# Patient Record
Sex: FEMALE | Race: WHITE | NOT HISPANIC OR LATINO | Employment: OTHER | URBAN - METROPOLITAN AREA
[De-identification: names, ages, dates, MRNs, and addresses within clinical notes are randomized per-mention and may not be internally consistent; named-entity substitution may affect disease eponyms.]

---

## 2017-01-06 RX ORDER — SODIUM CHLORIDE 0.9 % (FLUSH) 0.9 %
10 SYRINGE (ML) INJECTION
Status: CANCELLED | OUTPATIENT
Start: 2017-01-06

## 2017-01-06 RX ORDER — SODIUM CHLORIDE 9 MG/ML
INJECTION, SOLUTION INTRAVENOUS CONTINUOUS
Status: CANCELLED | OUTPATIENT
Start: 2017-01-06

## 2017-01-06 RX ORDER — HEPARIN 100 UNIT/ML
5 SYRINGE INTRAVENOUS
Status: CANCELLED | OUTPATIENT
Start: 2017-01-06

## 2017-01-12 ENCOUNTER — TELEPHONE (OUTPATIENT)
Dept: HEMATOLOGY/ONCOLOGY | Facility: CLINIC | Age: 77
End: 2017-01-12

## 2017-01-12 NOTE — TELEPHONE ENCOUNTER
----- Message from Namita Lorenzo sent at 1/10/2017 12:22 PM CST -----  Contact: Kerrie  Patient's daughter is calling to let know patient started the radiation treatments today. Thanks!

## 2017-01-16 ENCOUNTER — TELEPHONE (OUTPATIENT)
Dept: HEMATOLOGY/ONCOLOGY | Facility: CLINIC | Age: 77
End: 2017-01-16

## 2017-01-16 NOTE — TELEPHONE ENCOUNTER
Called pt and left her a message informing her of her lab appt scheduled for 1/27/17 @ 11AM and her Dr. Ceballos appt scheduled for 2/1/17 @ 11AM.  Asked pt to please return my call at [phone] for any questions or if this is not a good day/time for her.

## 2017-01-27 ENCOUNTER — LAB VISIT (OUTPATIENT)
Dept: LAB | Facility: HOSPITAL | Age: 77
End: 2017-01-27
Attending: INTERNAL MEDICINE
Payer: MEDICARE

## 2017-01-27 DIAGNOSIS — C34.00 MALIGNANT NEOPLASM OF HILUS OF LUNG, UNSPECIFIED LATERALITY: ICD-10-CM

## 2017-01-27 DIAGNOSIS — D50.8 OTHER IRON DEFICIENCY ANEMIA: ICD-10-CM

## 2017-01-27 LAB
ALBUMIN SERPL BCP-MCNC: 3.6 G/DL
ALP SERPL-CCNC: 182 U/L
ALT SERPL W/O P-5'-P-CCNC: 14 U/L
ANION GAP SERPL CALC-SCNC: 9 MMOL/L
ANISOCYTOSIS BLD QL SMEAR: ABNORMAL
AST SERPL-CCNC: 21 U/L
BASOPHILS # BLD AUTO: 0 K/UL
BASOPHILS NFR BLD: 0.5 %
BILIRUB SERPL-MCNC: 0.4 MG/DL
BUN SERPL-MCNC: 9 MG/DL
CALCIUM SERPL-MCNC: 10 MG/DL
CHLORIDE SERPL-SCNC: 101 MMOL/L
CO2 SERPL-SCNC: 26 MMOL/L
CREAT SERPL-MCNC: 0.8 MG/DL
DIFFERENTIAL METHOD: ABNORMAL
EOSINOPHIL # BLD AUTO: 0.3 K/UL
EOSINOPHIL NFR BLD: 3.5 %
ERYTHROCYTE [DISTWIDTH] IN BLOOD BY AUTOMATED COUNT: 30.2 %
EST. GFR  (AFRICAN AMERICAN): >60 ML/MIN/1.73 M^2
EST. GFR  (NON AFRICAN AMERICAN): >60 ML/MIN/1.73 M^2
FERRITIN SERPL-MCNC: 1307 NG/ML
GLUCOSE SERPL-MCNC: 102 MG/DL
HCT VFR BLD AUTO: 43.9 %
HGB BLD-MCNC: 13.5 G/DL
HYPOCHROMIA BLD QL SMEAR: ABNORMAL
IRON SERPL-MCNC: 103 UG/DL
LYMPHOCYTES # BLD AUTO: 1.4 K/UL
LYMPHOCYTES NFR BLD: 16.9 %
MCH RBC QN AUTO: 23.7 PG
MCHC RBC AUTO-ENTMCNC: 30.8 %
MCV RBC AUTO: 77 FL
MONOCYTES # BLD AUTO: 0.5 K/UL
MONOCYTES NFR BLD: 6.2 %
NEUTROPHILS # BLD AUTO: 6 K/UL
NEUTROPHILS NFR BLD: 72.9 %
OVALOCYTES BLD QL SMEAR: ABNORMAL
PLATELET # BLD AUTO: 264 K/UL
PLATELET BLD QL SMEAR: ABNORMAL
PMV BLD AUTO: 7.9 FL
POIKILOCYTOSIS BLD QL SMEAR: SLIGHT
POTASSIUM SERPL-SCNC: 4.1 MMOL/L
PROT SERPL-MCNC: 7.6 G/DL
RBC # BLD AUTO: 5.7 M/UL
SATURATED IRON: 30 %
SODIUM SERPL-SCNC: 136 MMOL/L
TOTAL IRON BINDING CAPACITY: 340 UG/DL
TRANSFERRIN SERPL-MCNC: 230 MG/DL
WBC # BLD AUTO: 8.2 K/UL

## 2017-01-27 PROCEDURE — 80053 COMPREHEN METABOLIC PANEL: CPT

## 2017-01-27 PROCEDURE — 36415 COLL VENOUS BLD VENIPUNCTURE: CPT

## 2017-01-27 PROCEDURE — 83540 ASSAY OF IRON: CPT

## 2017-01-27 PROCEDURE — 85025 COMPLETE CBC W/AUTO DIFF WBC: CPT

## 2017-01-27 PROCEDURE — 82728 ASSAY OF FERRITIN: CPT

## 2017-01-27 PROCEDURE — 84238 ASSAY NONENDOCRINE RECEPTOR: CPT

## 2017-01-28 LAB — STFR SERPL-MCNC: 7.2 MG/L

## 2017-02-01 ENCOUNTER — OFFICE VISIT (OUTPATIENT)
Dept: HEMATOLOGY/ONCOLOGY | Facility: CLINIC | Age: 77
End: 2017-02-01
Payer: MEDICARE

## 2017-02-01 VITALS
HEART RATE: 110 BPM | BODY MASS INDEX: 26.14 KG/M2 | WEIGHT: 138.44 LBS | SYSTOLIC BLOOD PRESSURE: 120 MMHG | DIASTOLIC BLOOD PRESSURE: 63 MMHG | HEIGHT: 61 IN | TEMPERATURE: 98 F | RESPIRATION RATE: 17 BRPM

## 2017-02-01 DIAGNOSIS — D50.0 IRON DEFICIENCY ANEMIA DUE TO CHRONIC BLOOD LOSS: Chronic | ICD-10-CM

## 2017-02-01 DIAGNOSIS — F32.0 MILD SINGLE CURRENT EPISODE OF MAJOR DEPRESSIVE DISORDER: Primary | ICD-10-CM

## 2017-02-01 DIAGNOSIS — J02.9 ACUTE PHARYNGITIS, UNSPECIFIED ETIOLOGY: ICD-10-CM

## 2017-02-01 DIAGNOSIS — R91.8 MASS OF UPPER LOBE OF RIGHT LUNG: ICD-10-CM

## 2017-02-01 DIAGNOSIS — C34.00 MALIGNANT NEOPLASM OF HILUS OF LUNG, UNSPECIFIED LATERALITY: ICD-10-CM

## 2017-02-01 PROCEDURE — 1157F ADVNC CARE PLAN IN RCRD: CPT | Mod: S$GLB,,, | Performed by: INTERNAL MEDICINE

## 2017-02-01 PROCEDURE — 1126F AMNT PAIN NOTED NONE PRSNT: CPT | Mod: S$GLB,,, | Performed by: INTERNAL MEDICINE

## 2017-02-01 PROCEDURE — 99999 PR PBB SHADOW E&M-EST. PATIENT-LVL III: CPT | Mod: PBBFAC,,, | Performed by: INTERNAL MEDICINE

## 2017-02-01 PROCEDURE — 99214 OFFICE O/P EST MOD 30 MIN: CPT | Mod: S$GLB,,, | Performed by: INTERNAL MEDICINE

## 2017-02-01 PROCEDURE — 1159F MED LIST DOCD IN RCRD: CPT | Mod: S$GLB,,, | Performed by: INTERNAL MEDICINE

## 2017-02-01 PROCEDURE — 99499 UNLISTED E&M SERVICE: CPT | Mod: S$PBB,,, | Performed by: INTERNAL MEDICINE

## 2017-02-01 PROCEDURE — 1160F RVW MEDS BY RX/DR IN RCRD: CPT | Mod: S$GLB,,, | Performed by: INTERNAL MEDICINE

## 2017-02-01 RX ORDER — AZITHROMYCIN 250 MG/1
TABLET, FILM COATED ORAL
Qty: 6 TABLET | Refills: 0 | Status: SHIPPED | OUTPATIENT
Start: 2017-02-01 | End: 2017-02-06

## 2017-02-01 RX ORDER — ALPRAZOLAM 0.25 MG/1
0.25 TABLET ORAL 3 TIMES DAILY PRN
Qty: 60 TABLET | Refills: 0 | Status: SHIPPED | OUTPATIENT
Start: 2017-02-01 | End: 2017-05-11 | Stop reason: SDUPTHER

## 2017-02-01 NOTE — LETTER
February 1, 2017      Silas Chowdary MD  [address]  [address]  [address]  [facility] [address]           [facility]  [address] [place] [address] Drive [address]  [facility] [address]  Phone: [phone]          Patient: Delicia Murray   MR Number: [id number]   YOB: 1940   Date of Visit: 2/1/2017       Dear Dr. Silas Chowdary:    Thank you for referring Delicia Murray to me for evaluation. Attached you will find relevant portions of my assessment and plan of care.    If you have questions, please do not hesitate to call me. I look forward to following Delicia Murray along with you.    Sincerely,    Brianna Ceballos MD    Enclosure  CC:  No Recipients    If you would like to receive this communication electronically, please contact [email or web address] or [phone] to request more information on [employer] Link access.    For providers and/or their staff who would like to refer a patient to [facility], please contact us through our one-stop-shop provider referral line, [facility] , at [phone].    If you feel you have received this communication in error or would no longer like to receive these types of communications, please e-mail [email or web address]

## 2017-02-01 NOTE — MR AVS SNAPSHOT
[facility]  [address] [place] [address] Drive [address]  [place] [address]  Phone: [phone]                  Delicia Murray   2017 11:00 AM   Office Visit    Description:  Female : 1940   Provider:  Brianna Ceballos MD   Department:  [place] [facility]           Reason for Visit     Lung Cancer     Cough           Diagnoses this Visit        Comments    Mild single current episode of major depressive disorder    -  Primary     Iron deficiency anemia due to chronic blood loss         Mass of upper lobe of right lung         Acute pharyngitis, unspecified etiology         Malignant neoplasm of hilus of lung, unspecified laterality                To Do List           Future Appointments        Provider Department Dept Phone    2017 11:00 AM Silas Chowdary MD [facility] [phone]    3/13/2017 11:00 AM [facility] [phone]    3/20/2017 11:00 AM MD [facility] [phone]    2017 1:00 PM Mono Duncan MD [facility] [phone]      Goals (5 Years of Data)     None       These Medications        Disp Refills Start End    azithromycin (ZITHROMAX) 250 MG tablet 6 tablet 0 2017    Take 2 tablets (500 mg) on  Day 1,  followed by 1 tablet (250 mg) once daily on Days 2 through 5.    [facility] [id number] [place] [address] [place] - [address] [facility] Ph #: [phone]       alprazolam (XANAX) 0.25 MG tablet 60 tablet 0 2017    Take 1 tablet (0.25 mg total) by mouth 3 (three) times daily as needed for Anxiety. - Oral    Pharmacy: [facility] [id number]  [place] - [address] [facility] Ph #: [phone]         [facility] On Call     [facility] On Call Nurse Care Line -  Assistance  Registered nurses in the [facility] On Call Center provide clinical advisement, health  "education, appointment booking, and other advisory services.  Call for this free service at [phone].             Medications           Message regarding Medications     Verify the changes and/or additions to your medication regime listed below are the same as discussed with your clinician today.  If any of these changes or additions are incorrect, please notify your healthcare provider.        START taking these NEW medications        Refills    azithromycin (ZITHROMAX) 250 MG tablet 0    Sig: Take 2 tablets (500 mg) on  Day 1,  followed by 1 tablet (250 mg) once daily on Days 2 through 5.    Class: Normal           Verify that the below list of medications is an accurate representation of the medications you are currently taking.  If none reported, the list may be blank. If incorrect, please contact your healthcare provider. Carry this list with you in case of emergency.           Current Medications     acetaminophen (TYLENOL) 325 MG tablet Take 325 mg by mouth every 6 (six) hours as needed.    alprazolam (XANAX) 0.25 MG tablet Take 1 tablet (0.25 mg total) by mouth 3 (three) times daily as needed for Anxiety.    mirtazapine (REMERON) 15 MG tablet Take 1 tablet (15 mg total) by mouth every evening.    pantoprazole (PROTONIX) 40 MG tablet Take 1 tablet (40 mg total) by mouth 2 (two) times daily.    sertraline (ZOLOFT) 100 MG tablet Take 1 tablet (100 mg total) by mouth every evening.    umeclidinium-vilanterol (ANORO ELLIPTA) 62.5-25 mcg/actuation DsDv Inhale 1 puff into the lungs once daily.    azithromycin (ZITHROMAX) 250 MG tablet Take 2 tablets (500 mg) on  Day 1,  followed by 1 tablet (250 mg) once daily on Days 2 through 5.           Clinical Reference Information           Vital Signs - Last Recorded  Most recent update: 2/1/2017 10:46 AM by Maura Su LPN    BP Pulse Temp Resp Ht Wt    120/63 110 97.6 °F (36.4 °C) 17 5' 1" (1.549 m) 62.8 kg (138 lb 7.2 oz)    BMI                26.16 kg/m2     "      Blood Pressure          Most Recent Value    BP  120/63      Allergies as of 2/1/2017     Ferrlecit [Sodium Ferric Gluconat-sucrose]    Sulfa (Sulfonamide Antibiotics)    Adhesive      Immunizations Administered on Date of Encounter - 2/1/2017     None      Orders Placed During Today's Visit     Future Labs/Procedures Expected by Expires    CBC w/ DIFF  2/1/2017 4/2/2018    CMP  2/1/2017 4/2/2018    FERRITIN  2/1/2017 4/2/2018    Iron and TIBC  2/1/2017 4/2/2018    STFR  2/1/2017 4/2/2018

## 2017-02-01 NOTE — PROGRESS NOTES
Subjective:       Patient ID: Delicia Murray is a 76 y.o. female.    Chief Complaint: Lung Cancer and Cough    HPI:   Pt last saw me in 6/2016 . Has been following on a 3 monthly schedule for SARAH, heavy menses in her youth, had scopes and w/u did well after iron infusion. Went to Dr. Duncan's clinic for copd exacerbation and feeling poorly, cxr revealed rt lung mass . Pt was ref. To DR. Chowdary pulmonolgy and then to Dr. velazquez with whom she underwent FNA of this mass.here to discuss results with her daughters. Sq cell ca, went back to f/u with DR. Velazquez, lung probably wont have good outcome from resection struggling with URI  Chose to go with xrt and has completed xrt    REVIEW OF SYSTEMS:     CONSTITUTIONAL: having URI+  SKIN: Denies rash, issues with nails, non-healing sores, bleeding, blotching    skin or abnormal bruising. Denies new moles or changes to existing moles.      BREASTS: There is no swelling around breasts or nipple discharge.    EYES: Denies eye pain, blurred vision, swelling, redness or discharge.      Cough + sputum production+  Feeling stuffy+  CARDIOVASCULAR: Denies chest pain, discomfort or palpitations. Denies neck    swelling or episodes of passing out.      RESPIRATORY: Denies cough, sputum production, blood in sputum, and denies    shortness of breath.      GI: Denies trouble swallowing, indigestion, heartburn, abdominal pain, nausea,    vomiting, diarrhea, altered bowel habits, blood in stool, discoloration of    stools, change in nature of stool, bloating, increased abdominal girth.      GENITOURINARY: No discharge. No pelvic pain or lumps. No rash around groin or  lesions. No urinary frequency, hesitation, painful urination or blood in    urine. Denies incontinence. No problems with intercourse.      MUSCULOSKELETAL: Denies neck or back pain. Denies weakness in arms or legs,    joint problems or distended inflamed veins in legs. Denies swelling or abnormal  glands.      NEUROLOGICAL:  Denies tingling, numbness, altered mentation changes to nerve    function in the face, weakness to one or both of the body. Denies changes to    gait and denies multiple falls or accidents.        PHYSICAL EXAM:     Vitals:    02/01/17 1044   BP: 120/63   Pulse: 110   Resp: 17   Temp: 97.6 °F (36.4 °C)       GENERAL: Comfortable looking patient. Patient is in no distress.  Awake, alert and oriented to time, person and place.  No anxiety, or agitation.      HEENT: Normal conjunctivae and eyelids. WNL.  PERRLA 3 to 4 mm. No icterus, no pallor, no congestion, and no discharge noted.     NECK:  Supple. Trachea is central.  No crepitus.  No JVD or masses.    RESPIRATORY:  No intercostal retractions.  No dullness to percussion.  Chest is clear to auscultation.  No rales, rhonchi or wheezes.  No crepitus.  Good air entry bilaterally.    CARDIOVASCULAR:  S1 and S2 are normally heard without murmurs or gallops.  All peripheral pulses are present.    ABDOMEN:  Normal abdomen.  No hepatosplenomegaly.  No free fluid.  Bowel sounds are present.  No hernia noted. No masses.  No rebound or tenderness.  No guarding or rigidity.  Umbilicus is midline.    LYMPHATICS:  No axillary, cervical, supraclavicular, submental, or inguinal lymphadenopathy.    SKIN/MUSCULOSKELETAL:  There is no evidence of excoriation marks or ecchmosis.  No rashes.  No cyanosis.  No clubbing.  No joint or skeletal deformities noted.  Normal range of motion.    NEUROLOGIC:  Higher functions are appropriate.  No cranial nerve deficits.  Normal nagi.  Normal strength.  Motor and sensory functions are normal.  Deep tendon reflexes are normal.    GENITAL/RECTAL:  Exams are deferred.      Laboratory:     CBC:  Lab Results   Component Value Date    WBC 8.20 01/27/2017    RBC 5.70 (H) 01/27/2017    HGB 13.5 01/27/2017    HCT 43.9 01/27/2017    MCV 77 (L) 01/27/2017    MCH 23.7 (L) 01/27/2017    MCHC 30.8 (L) 01/27/2017    RDW 30.2 (H) 01/27/2017     01/27/2017     MPV 7.9 (L) 01/27/2017    GRAN 6.0 01/27/2017    GRAN 72.9 01/27/2017    LYMPH 1.4 01/27/2017    LYMPH 16.9 (L) 01/27/2017    MONO 0.5 01/27/2017    MONO 6.2 01/27/2017    EOS 0.3 01/27/2017    BASO 0.00 01/27/2017    EOSINOPHIL 3.5 01/27/2017    BASOPHIL 0.5 01/27/2017       BMP: BMP  Lab Results   Component Value Date     01/27/2017    K 4.1 01/27/2017     01/27/2017    CO2 26 01/27/2017    BUN 9 01/27/2017    CREATININE 0.8 01/27/2017    CALCIUM 10.0 01/27/2017    ANIONGAP 9 01/27/2017    ESTGFRAFRICA >60 01/27/2017    EGFRNONAA >60 01/27/2017       LFT:   Lab Results   Component Value Date    ALT 14 01/27/2017    AST 21 01/27/2017    ALKPHOS 182 (H) 01/27/2017    BILITOT 0.4 01/27/2017     Lab Results   Component Value Date    IRON 103 01/27/2017    TIBC 340 01/27/2017    FERRITIN 1307 (H) 01/27/2017       Assessment/Plan:     FINAL PATHOLOGIC DIAGNOSIS  CT-GUIDED CORE NEEDLE BIOPSY OF THE RIGHT UPPER LOBE:  POSITIVE FOR SQUAMOUS CELL CARCINOMA     Assessment/Plan:       1. Malignant neoplasm of hilus of lung, unspecified laterality        2. SARAH , good rsponse to injectafer3. Anxiety, depression  4.GERDS  . PET scan reviwed [facility],   No evidence of metatstic dz,  Pt saw DR. Haney, surgery vs xrt discussed.  Pt has completed xrt rx to lung 1/20. Going to see Dr. Chowdary on Monday, sees Dr. rosenthal on Friday this week   rtc 6 weeks with cbc, cmp   zpak for uri   xanax was not approved

## 2017-02-06 ENCOUNTER — OFFICE VISIT (OUTPATIENT)
Dept: PULMONOLOGY | Facility: CLINIC | Age: 77
End: 2017-02-06
Payer: MEDICARE

## 2017-02-06 VITALS
BODY MASS INDEX: 26.47 KG/M2 | OXYGEN SATURATION: 95 % | WEIGHT: 140.19 LBS | SYSTOLIC BLOOD PRESSURE: 139 MMHG | HEART RATE: 103 BPM | DIASTOLIC BLOOD PRESSURE: 77 MMHG | HEIGHT: 61 IN

## 2017-02-06 DIAGNOSIS — C34.11 MALIGNANT NEOPLASM OF UPPER LOBE OF RIGHT LUNG: ICD-10-CM

## 2017-02-06 DIAGNOSIS — J43.9 PULMONARY EMPHYSEMA, UNSPECIFIED EMPHYSEMA TYPE: ICD-10-CM

## 2017-02-06 DIAGNOSIS — J84.10 PULMONARY FIBROSIS: Primary | ICD-10-CM

## 2017-02-06 DIAGNOSIS — J44.9 COPD MIXED TYPE: ICD-10-CM

## 2017-02-06 PROCEDURE — 1126F AMNT PAIN NOTED NONE PRSNT: CPT | Mod: S$GLB,,, | Performed by: INTERNAL MEDICINE

## 2017-02-06 PROCEDURE — 99214 OFFICE O/P EST MOD 30 MIN: CPT | Mod: S$GLB,,, | Performed by: INTERNAL MEDICINE

## 2017-02-06 PROCEDURE — 1160F RVW MEDS BY RX/DR IN RCRD: CPT | Mod: S$GLB,,, | Performed by: INTERNAL MEDICINE

## 2017-02-06 PROCEDURE — 99499 UNLISTED E&M SERVICE: CPT | Mod: S$PBB,,, | Performed by: INTERNAL MEDICINE

## 2017-02-06 PROCEDURE — 1159F MED LIST DOCD IN RCRD: CPT | Mod: S$GLB,,, | Performed by: INTERNAL MEDICINE

## 2017-02-06 PROCEDURE — 1157F ADVNC CARE PLAN IN RCRD: CPT | Mod: S$GLB,,, | Performed by: INTERNAL MEDICINE

## 2017-02-06 PROCEDURE — 99999 PR PBB SHADOW E&M-EST. PATIENT-LVL III: CPT | Mod: PBBFAC,,, | Performed by: INTERNAL MEDICINE

## 2017-02-06 NOTE — PATIENT INSTRUCTIONS
You have wheezes.  Prednisone should help clear and would give energy.   Suggest take for 3 days and repeat if needed.    Pulmonary fibrosis looks to be present?, special medication may help.  Would follow ct chest and consider fibrosis treatment if needed.  Picture perhaps more likely to be copd with emphysema looking like fibrosis especially with wheezes on exam.

## 2017-02-06 NOTE — MR AVS SNAPSHOT
[facility]  [address]  [place] [address]  Phone: [phone]                  Delicia Murray   2017 11:00 AM   Office Visit    Description:  Female : 1940   Provider:  Silas Chowdary MD   Department:  [facility]           Reason for Visit     Follow-up     Lung Mass           Diagnoses this Visit        Comments    Pulmonary fibrosis    -  Primary     Pulmonary emphysema, unspecified emphysema type         COPD mixed type         Malignant neoplasm of upper lobe of right lung                To Do List           Future Appointments        Provider Department Dept Phone    3/13/2017 11:00 AM LAB[facility] [phone]    3/20/2017 11:00 AM MD [facility] [phone]    2017 1:00 PM MD [facility] Family Medicine [phone]    2017 11:00 [facility] [phone]      Goals (5 Years of Data)     None      Follow-Up and Disposition     Return in about 4 months (around 2017).    Follow-up and Disposition History      [facility] On Call     [facility] On Call Nurse Care Line -  Assistance  Registered nurses in the [facility] On Call Center provide clinical advisement, health education, appointment booking, and other advisory services.  Call for this free service at [phone].             Medications           Message regarding Medications     Verify the changes and/or additions to your medication regime listed below are the same as discussed with your clinician today.  If any of these changes or additions are incorrect, please notify your healthcare provider.             Verify that the below list of medications is an accurate representation of the medications you are currently taking.  If none reported, the list may be blank. If incorrect, please contact your healthcare provider. Carry this list with you in case of emergency.     "       Current Medications     acetaminophen (TYLENOL) 325 MG tablet Take 325 mg by mouth every 6 (six) hours as needed.    alprazolam (XANAX) 0.25 MG tablet Take 1 tablet (0.25 mg total) by mouth 3 (three) times daily as needed for Anxiety.    azithromycin (ZITHROMAX) 250 MG tablet Take 2 tablets (500 mg) on  Day 1,  followed by 1 tablet (250 mg) once daily on Days 2 through 5.    mirtazapine (REMERON) 15 MG tablet Take 1 tablet (15 mg total) by mouth every evening.    pantoprazole (PROTONIX) 40 MG tablet Take 1 tablet (40 mg total) by mouth 2 (two) times daily.    sertraline (ZOLOFT) 100 MG tablet Take 1 tablet (100 mg total) by mouth every evening.    umeclidinium-vilanterol (ANORO ELLIPTA) 62.5-25 mcg/actuation DsDv Inhale 1 puff into the lungs once daily.           Clinical Reference Information           Your Vitals Were     BP Pulse Height Weight SpO2 BMI    139/77 (BP Location: Left arm, Patient Position: Sitting, BP Method: Automatic) 103 5' 1" (1.549 m) 63.6 kg (140 lb 3.4 oz) 95% 26.49 kg/m2      Blood Pressure          Most Recent Value    BP  139/77      Allergies as of 2/6/2017     Ferrlecit [Sodium Ferric Gluconat-sucrose]    Sulfa (Sulfonamide Antibiotics)    Adhesive      Immunizations Administered on Date of Encounter - 2/6/2017     None      Instructions    You have wheezes.  Prednisone should help clear and would give energy.   Suggest take for 3 days and repeat if needed.    Pulmonary fibrosis looks to be present?, special medication may help.  Would follow ct chest and consider fibrosis treatment if needed.  Picture perhaps more likely to be copd with emphysema looking like fibrosis especially with wheezes on exam.       Language Assistance Services     ATTENTION: Language assistance services are available, free of charge. Please call [phone].      ATENCIÓN: Si patrick stephens, tiene a hartley disposición servicios gratuitos de asistencia lingüística. Llame al [phone].     CHÚ Ý: N?u b?n " nói Ti?ng Vi?t, có các d?ch v? h? tr? ngôn ng? mi?n phí dành cho b?n. G?i s? [phone].         [facility] complies with applicable Federal civil rights laws and does not discriminate on the basis of race, color, national origin, age, disability, or sex.

## 2017-02-06 NOTE — PROGRESS NOTES
"2/6/2017    Delicia Murray  New Patient Consult    Chief Complaint   Patient presents with    Follow-up    Lung Mass     Feb 6,  Has some bernard, elected for xrt- completed at cancer center on [address].  Feels well.  Fatigue ok , and iron levels up.  Anxiety good.        Nov 4, 2016HPI: i cared for pt's  in hosp.  Pt smoked past, has emphysema on no routine meds.  Had cough and felt poorly a month ago.  H/o chr iron def and freq fatigue.  Had lesion found right lung and had mass. Pet active.  No recent wt loss or bone pain.     anxiety on zoloft,         The chief compliant  problem is new to me",   PFSH:  Past Medical History   Diagnosis Date    Anemia     Blood transfusion     Colon polyps     Insomnia          Past Surgical History   Procedure Laterality Date    Hysterectomy      Cholecystectomy      Skin biopsy      Tonsillectomy      Broken jaw       left     Social History   Substance Use Topics    Smoking status: Former Smoker     Packs/day: 0.50     Years: 60.00    Smokeless tobacco: Former User     Quit date: 10/3/2016    Alcohol use Yes      Comment: occasional     Family History   Problem Relation Age of Onset    Heart disease Mother     Cancer Father 69     unknown     Review of patient's allergies indicates:   Allergen Reactions    Ferrlecit [sodium ferric gluconat-sucrose]      Generalized rash, pruritus    Sulfa (sulfonamide antibiotics)     Adhesive Rash       Performance Status:The patient's activity level is functions out of house.      Review of Systems:  a review of eleven systems covering constitutional, Eye, HEENT, Psych, Respiratory, Cardiac, GI, , Musculoskeletal, Endocrine, Dermatologic was negative except for pertinent findings as listed ABOVE and below: iron def and anxiety and copd..     Exam:Comprehensive exam done.   Visit Vitals    /77 (BP Location: Left arm, Patient Position: Sitting, BP Method: Automatic)    Pulse 103    Ht 5' 1" (1.549 m)    Wt " 63.6 kg (140 lb 3.4 oz)    SpO2 95%    BMI 26.49 kg/m2     Exam included Vitals as listed, and patient's appearance and affect and alertness and mood, oral exam for yeast and hygiene and pharynx lesions and Mallapatti (M) score, neck with inspection for jvd and masses and thyroid abnormalities and lymph nodes (supraclavicular and infraclavicular nodes and axillary also examined and noted if abn), chest exam included symmetry and effort and fremitus and percussion and auscultation, cardiac exam included rhythm and gallops and murmur and rubs and jvd and edema, abdominal exam for mass and hepatosplenomegaly and tenderness and hernias and bowel sounds, Musculoskeletal exam with muscle tone and posture and mobility/gait and  strength, and skin for rashes and cyanosis and pallor and turgor, extremity for clubbing.  Findings were normal except for pertinent findings listed below:  M2 and min rale with occ wheezes and good    Radiographs (ct chest and cxr) reviewed: view by direct vision 2013 and 2016 cxr with new rul mass, ild and emphysema seen. Ct viewed with nodes, pet viewed also    Axial scans of the chest were obtained without IV contrast    There are scattered lucencies and numerous scattered blebs including multiple peripheral subpleural blebs and there is peripheral intralobular septal thickening.  corresponding as seen on the recent chest x-ray of 10/10/2016 there is an irregular approximately 1.6 x 1.1 0.6 cm mass with spiculated indistinct margins extending to the right lateral chest wall with thickening of the overlying pleura.  More mild pleural thickening is seen elsewhere.    There are mildly prominent mediastinal lymph nodes short axis diameter precarinal node 9 mm.  There is old granulomatous disease with calcified hilar nodes.  There is moderately large hiatal hernia.  There are Calcified granulomas are seen in the liver and spleen and there surgical clips in the gallbladder fossa.  There are two  juxtapose or a lobular circumscribed 1.5 cm mass in the liver suggesting cyst and note is made that ultrasound abdomen 3/31/2015 do describe cysts.  Visualized abdominal aorta appears mildly ectatic.  There are degenerative changes in the spine and the skeletal structures are osteopenic and there is mild loss of height of a few vertebra near the thoracolumbar junction:       Impression          Emphysematous change and findings consistent pulmonary fibrosis appearing severe.  Spiculated density right upper lobe suspicious for neoplasm.  differential includes pneumonia and recommend correlation clinically particularly if clinical consideration for pneumonia short interval follow could be considered to determine if this resolves.      Electronically signed by: VINNIE CROWLEY MD  Date: 10/17/16  Time: 14:12          Labs reviewed from last CBC,CMP,ABG,Micro last 3 entries  on EPIC result review    hbg close to 8    PFT results reviewed fev 1.06-1.15 and dlco 54%    Plan:  Clinical impression is resonably certain and repeated evaluation prn +/- follow up will be needed as below.    Delicia was seen today for follow-up and lung mass.    Diagnoses and all orders for this visit:    Pulmonary fibrosis    Pulmonary emphysema, unspecified emphysema type    COPD mixed type      Return in about 4 months (around 6/6/2017).    Discussed with patient above for education the following:       You have wheezes.  Prednisone should help clear and would give energy.   Suggest take for 3 days and repeat if needed.    Pulmonary fibrosis looks to be present?, special medication may help.  Would follow ct chest and consider fibrosis treatment if needed.  Picture perhaps more likely to be copd with emphysema looking like fibrosis especially with wheezes on exam.

## 2017-03-02 DIAGNOSIS — J43.2 CENTRILOBULAR EMPHYSEMA: ICD-10-CM

## 2017-03-02 RX ORDER — PANTOPRAZOLE SODIUM 40 MG/1
TABLET, DELAYED RELEASE ORAL
Qty: 60 TABLET | Refills: 3 | Status: SHIPPED | OUTPATIENT
Start: 2017-03-02 | End: 2017-11-05 | Stop reason: SDUPTHER

## 2017-03-13 ENCOUNTER — LAB VISIT (OUTPATIENT)
Dept: LAB | Facility: HOSPITAL | Age: 77
End: 2017-03-13
Attending: INTERNAL MEDICINE
Payer: MEDICARE

## 2017-03-13 DIAGNOSIS — J02.9 ACUTE PHARYNGITIS, UNSPECIFIED ETIOLOGY: ICD-10-CM

## 2017-03-13 LAB
ALBUMIN SERPL BCP-MCNC: 3.3 G/DL
ALP SERPL-CCNC: 137 U/L
ALT SERPL W/O P-5'-P-CCNC: 15 U/L
ANION GAP SERPL CALC-SCNC: 9 MMOL/L
AST SERPL-CCNC: 19 U/L
BASOPHILS # BLD AUTO: 0.1 K/UL
BASOPHILS NFR BLD: 1.3 %
BILIRUB SERPL-MCNC: 0.3 MG/DL
BUN SERPL-MCNC: 7 MG/DL
CALCIUM SERPL-MCNC: 9.5 MG/DL
CHLORIDE SERPL-SCNC: 101 MMOL/L
CO2 SERPL-SCNC: 29 MMOL/L
CREAT SERPL-MCNC: 0.7 MG/DL
DIFFERENTIAL METHOD: ABNORMAL
EOSINOPHIL # BLD AUTO: 0.4 K/UL
EOSINOPHIL NFR BLD: 3.9 %
ERYTHROCYTE [DISTWIDTH] IN BLOOD BY AUTOMATED COUNT: 23.4 %
EST. GFR  (AFRICAN AMERICAN): >60 ML/MIN/1.73 M^2
EST. GFR  (NON AFRICAN AMERICAN): >60 ML/MIN/1.73 M^2
FERRITIN SERPL-MCNC: 250 NG/ML
GLUCOSE SERPL-MCNC: 93 MG/DL
HCT VFR BLD AUTO: 40.3 %
HGB BLD-MCNC: 12.9 G/DL
LYMPHOCYTES # BLD AUTO: 2 K/UL
LYMPHOCYTES NFR BLD: 21.7 %
MCH RBC QN AUTO: 26.5 PG
MCHC RBC AUTO-ENTMCNC: 31.9 %
MCV RBC AUTO: 83 FL
MONOCYTES # BLD AUTO: 0.5 K/UL
MONOCYTES NFR BLD: 5.7 %
NEUTROPHILS # BLD AUTO: 6.3 K/UL
NEUTROPHILS NFR BLD: 67.4 %
PLATELET # BLD AUTO: 296 K/UL
PMV BLD AUTO: 7.8 FL
POTASSIUM SERPL-SCNC: 3.9 MMOL/L
PROT SERPL-MCNC: 7.5 G/DL
RBC # BLD AUTO: 4.86 M/UL
SODIUM SERPL-SCNC: 139 MMOL/L
WBC # BLD AUTO: 9.3 K/UL

## 2017-03-13 PROCEDURE — 84238 ASSAY NONENDOCRINE RECEPTOR: CPT

## 2017-03-13 PROCEDURE — 80053 COMPREHEN METABOLIC PANEL: CPT

## 2017-03-13 PROCEDURE — 82728 ASSAY OF FERRITIN: CPT

## 2017-03-13 PROCEDURE — 85025 COMPLETE CBC W/AUTO DIFF WBC: CPT

## 2017-03-13 PROCEDURE — 83540 ASSAY OF IRON: CPT

## 2017-03-14 LAB
IRON SERPL-MCNC: 72 UG/DL
SATURATED IRON: 21 %
TOTAL IRON BINDING CAPACITY: 342 UG/DL
TRANSFERRIN SERPL-MCNC: 231 MG/DL

## 2017-03-15 LAB — STFR SERPL-MCNC: 4.7 MG/L

## 2017-03-20 ENCOUNTER — OFFICE VISIT (OUTPATIENT)
Dept: HEMATOLOGY/ONCOLOGY | Facility: CLINIC | Age: 77
End: 2017-03-20
Payer: MEDICARE

## 2017-03-20 VITALS
WEIGHT: 143.06 LBS | DIASTOLIC BLOOD PRESSURE: 66 MMHG | HEIGHT: 61 IN | HEART RATE: 107 BPM | SYSTOLIC BLOOD PRESSURE: 111 MMHG | TEMPERATURE: 99 F | RESPIRATION RATE: 15 BRPM | BODY MASS INDEX: 27.01 KG/M2

## 2017-03-20 DIAGNOSIS — F32.89 OTHER DEPRESSION: Primary | ICD-10-CM

## 2017-03-20 DIAGNOSIS — J44.9 COPD MIXED TYPE: ICD-10-CM

## 2017-03-20 DIAGNOSIS — C34.11 MALIGNANT NEOPLASM OF UPPER LOBE OF RIGHT LUNG: ICD-10-CM

## 2017-03-20 DIAGNOSIS — D50.0 IRON DEFICIENCY ANEMIA DUE TO CHRONIC BLOOD LOSS: Chronic | ICD-10-CM

## 2017-03-20 PROCEDURE — 1160F RVW MEDS BY RX/DR IN RCRD: CPT | Mod: S$GLB,,, | Performed by: INTERNAL MEDICINE

## 2017-03-20 PROCEDURE — 99999 PR PBB SHADOW E&M-EST. PATIENT-LVL III: CPT | Mod: PBBFAC,,, | Performed by: INTERNAL MEDICINE

## 2017-03-20 PROCEDURE — 1157F ADVNC CARE PLAN IN RCRD: CPT | Mod: S$GLB,,, | Performed by: INTERNAL MEDICINE

## 2017-03-20 PROCEDURE — 99499 UNLISTED E&M SERVICE: CPT | Mod: S$PBB,,, | Performed by: INTERNAL MEDICINE

## 2017-03-20 PROCEDURE — 1159F MED LIST DOCD IN RCRD: CPT | Mod: S$GLB,,, | Performed by: INTERNAL MEDICINE

## 2017-03-20 PROCEDURE — 99214 OFFICE O/P EST MOD 30 MIN: CPT | Mod: S$GLB,,, | Performed by: INTERNAL MEDICINE

## 2017-03-20 PROCEDURE — 1126F AMNT PAIN NOTED NONE PRSNT: CPT | Mod: S$GLB,,, | Performed by: INTERNAL MEDICINE

## 2017-03-20 NOTE — MR AVS SNAPSHOT
[facility] [facility]  [address] [place] [address] Drive [address]  [place] [address]  Phone: [phone]                  Delicia Murray   3/20/2017 11:00 AM   Office Visit    Description:  Female : 1940   Provider:  Brianna Ceballos MD   Department:  [facility]           Reason for Visit     Lung Cancer           Diagnoses this Visit        Comments    Other depression    -  Primary     Iron deficiency anemia due to chronic blood loss         Malignant neoplasm of upper lobe of right lung         COPD mixed type                To Do List           Future Appointments        Provider Department Dept Phone    2017 1:00 PM Mono Duncan MD [place] [facility] [phone]    2017 11:00 AM Silas Chowdary MD [facility] [phone]      Goals (5 Years of Data)     None      [facility]     [facility] -  Assistance  Registered nurses in the [facility] provide clinical advisement, health education, appointment booking, and other advisory services.  Call for this free service at [phone].             Medications           Message regarding Medications     Verify the changes and/or additions to your medication regime listed below are the same as discussed with your clinician today.  If any of these changes or additions are incorrect, please notify your healthcare provider.             Verify that the below list of medications is an accurate representation of the medications you are currently taking.  If none reported, the list may be blank. If incorrect, please contact your healthcare provider. Carry this list with you in case of emergency.           Current Medications     acetaminophen (TYLENOL) 325 MG tablet Take 325 mg by mouth every 6 (six) hours as needed.    alprazolam (XANAX) 0.25 MG tablet Take 1 tablet (0.25 mg total) by mouth 3 (three) times daily as needed for Anxiety.    mirtazapine (REMERON) 15 MG  "tablet Take 1 tablet (15 mg total) by mouth every evening.    pantoprazole (PROTONIX) 40 MG tablet TAKE 1 TABLET(40 MG) BY MOUTH TWICE DAILY    sertraline (ZOLOFT) 100 MG tablet Take 1 tablet (100 mg total) by mouth every evening.    umeclidinium-vilanterol (ANORO ELLIPTA) 62.5-25 mcg/actuation DsDv Inhale 1 puff into the lungs once daily.           Clinical Reference Information           Your Vitals Were     BP Pulse Temp Resp Height Weight    111/66 107 98.6 °F (37 °C) 15 5' 1" (1.549 m) 64.9 kg (143 lb 1.3 oz)    BMI                27.03 kg/m2          Blood Pressure          Most Recent Value    BP  111/66      Allergies as of 3/20/2017     Ferrlecit [Sodium Ferric Gluconat-sucrose]    Sulfa (Sulfonamide Antibiotics)    Adhesive      Immunizations Administered on Date of Encounter - 3/20/2017     None      Orders Placed During Today's Visit     Future Labs/Procedures Expected by Expires    CBC w/ DIFF  3/20/2017 5/19/2018    CMP  3/20/2017 5/19/2018    FERRITIN  3/20/2017 5/19/2018    Iron and TIBC  3/20/2017 5/19/2018    STFR  3/20/2017 5/19/2018      Language Assistance Services     ATTENTION: Language assistance services are available, free of charge. Please call [phone].      ATENCIÓN: Si habla angelo, tiene a hartley disposición servicios gratuitos de asistencia lingüística. Llame al [phone].     CHÚ Ý: N?u b?n nói Ti?ng Vi?t, có các d?ch v? h? tr? ngôn ng? mi?n phí dành cho b?n. G?i s? [phone].         [facility] complies with applicable Federal civil rights laws and does not discriminate on the basis of race, color, national origin, age, disability, or sex.        "

## 2017-03-20 NOTE — PROGRESS NOTES
Subjective:       Patient ID: Delicia Murray is a 77 y.o. female.    Chief Complaint: Lung Cancer    HPI:   Pt last saw me in 6/2016 . Has been following on a 3 monthly schedule for SARAH, heavy menses in her youth, had scopes and w/u did well after iron infusion. Went to Dr. Duncan's clinic for copd exacerbation and feeling poorly, cxr revealed rt lung mass . Pt was ref. To DR. Chowdary pulmonolgy and then to Dr. velazquez with whom she underwent FNA of this mass.here to discuss results with her daughters. Sq cell ca, went back to f/u with DR. Velazquez, lung probably wont have good outcome from resection.  Chose to go with xrt and has completed xrt here for f/u    REVIEW OF SYSTEMS:     CONSTITUTIONAL: SKIN: Denies rash, issues with nails, non-healing sores, bleeding, blotching    skin or abnormal bruising. Denies new moles or changes to existing moles.      BREASTS: There is no swelling around breasts or nipple discharge.    EYES: Denies eye pain, blurred vision, swelling, redness or discharge.    CARDIOVASCULAR: Denies chest pain, discomfort or palpitations. Denies neck    swelling or episodes of passing out.      RESPIRATORY: Denies cough, sputum production, blood in sputum, and denies    shortness of breath.      GI: Denies trouble swallowing, indigestion, heartburn, abdominal pain, nausea,    vomiting, diarrhea, altered bowel habits, blood in stool, discoloration of    stools, change in nature of stool, bloating, increased abdominal girth.      GENITOURINARY: No discharge. No pelvic pain or lumps. No rash around groin or  lesions. No urinary frequency, hesitation, painful urination or blood in    urine. Denies incontinence. No problems with intercourse.      MUSCULOSKELETAL: Denies neck or back pain. Denies weakness in arms or legs,    joint problems or distended inflamed veins in legs. Denies swelling or abnormal  glands.      NEUROLOGICAL: Denies tingling, numbness, altered mentation changes to nerve    function  in the face, weakness to one or both of the body. Denies changes to    gait and denies multiple falls or accidents.        PHYSICAL EXAM:     Vitals:    03/20/17 1101   BP: 111/66   Pulse: 107   Resp: 15   Temp: 98.6 °F (37 °C)       GENERAL: Comfortable looking patient. Patient is in no distress.  Awake, alert and oriented to time, person and place.  No anxiety, or agitation.      HEENT: Normal conjunctivae and eyelids. WNL.  PERRLA 3 to 4 mm. No icterus, no pallor, no congestion, and no discharge noted.     NECK:  Supple. Trachea is central.  No crepitus.  No JVD or masses.    RESPIRATORY:  No intercostal retractions.  No dullness to percussion.  Chest is clear to auscultation.  No rales, rhonchi or wheezes.  No crepitus.  Good air entry bilaterally.    CARDIOVASCULAR:  S1 and S2 are normally heard without murmurs or gallops.  All peripheral pulses are present.    ABDOMEN:  Normal abdomen.  No hepatosplenomegaly.  No free fluid.  Bowel sounds are present.  No hernia noted. No masses.  No rebound or tenderness.  No guarding or rigidity.  Umbilicus is midline.    LYMPHATICS:  No axillary, cervical, supraclavicular, submental, or inguinal lymphadenopathy.    SKIN/MUSCULOSKELETAL:  There is no evidence of excoriation marks or ecchmosis.  No rashes.  No cyanosis.  No clubbing.  No joint or skeletal deformities noted.  Normal range of motion.    NEUROLOGIC:  Higher functions are appropriate.  No cranial nerve deficits.  Normal nagi.  Normal strength.  Motor and sensory functions are normal.  Deep tendon reflexes are normal.    GENITAL/RECTAL:  Exams are deferred.      Laboratory:     CBC:  Lab Results   Component Value Date    WBC 9.30 03/13/2017    RBC 4.86 03/13/2017    HGB 12.9 03/13/2017    HCT 40.3 03/13/2017    MCV 83 03/13/2017    MCH 26.5 (L) 03/13/2017    MCHC 31.9 (L) 03/13/2017    RDW 23.4 (H) 03/13/2017     03/13/2017    MPV 7.8 (L) 03/13/2017    GRAN 6.3 03/13/2017    GRAN 67.4 03/13/2017    LYMPH  2.0 03/13/2017    LYMPH 21.7 03/13/2017    MONO 0.5 03/13/2017    MONO 5.7 03/13/2017    EOS 0.4 03/13/2017    BASO 0.10 03/13/2017    EOSINOPHIL 3.9 03/13/2017    BASOPHIL 1.3 03/13/2017       BMP: BMP  Lab Results   Component Value Date     03/13/2017    K 3.9 03/13/2017     03/13/2017    CO2 29 03/13/2017    BUN 7 (L) 03/13/2017    CREATININE 0.7 03/13/2017    CALCIUM 9.5 03/13/2017    ANIONGAP 9 03/13/2017    ESTGFRAFRICA >60 03/13/2017    EGFRNONAA >60 03/13/2017       LFT:   Lab Results   Component Value Date    ALT 15 03/13/2017    AST 19 03/13/2017    ALKPHOS 137 (H) 03/13/2017    BILITOT 0.3 03/13/2017     Lab Results   Component Value Date    IRON 72 03/13/2017    TIBC 342 03/13/2017    FERRITIN 250 03/13/2017       Assessment/Plan:     FINAL PATHOLOGIC DIAGNOSIS  CT-GUIDED CORE NEEDLE BIOPSY OF THE RIGHT UPPER LOBE:  POSITIVE FOR SQUAMOUS CELL CARCINOMA     Assessment/Plan:       1. Malignant neoplasm of hilus of lung, unspecified laterality        2. SARAH , good rsponse to injectafer  3. Anxiety, depression  4.GERDS  . PET scan reviwed from [facility], has another scan for next week ordered by xrt  Pt has completed xrt rx to lung 1/20.  Continues f/u with DR. Epi sands to  see Dr. rosenthal    rtc 3 months with cbc, cmp iron studies

## 2017-03-21 ENCOUNTER — TELEPHONE (OUTPATIENT)
Dept: HEMATOLOGY/ONCOLOGY | Facility: CLINIC | Age: 77
End: 2017-03-21

## 2017-03-21 NOTE — TELEPHONE ENCOUNTER
----- Message from Jacklyn Myers sent at 3/21/2017  8:59 AM CDT -----  Contact: self  Patient is returning call regarding Pet Scan. Patient states she had it at [facility] on 10/25/16 and is scheduled for another one on 04/11/17.  Please call patient at [phone].  Thanks!

## 2017-04-06 DIAGNOSIS — C34.00 MALIGNANT NEOPLASM OF HILUS OF LUNG, UNSPECIFIED LATERALITY: ICD-10-CM

## 2017-04-06 RX ORDER — ALPRAZOLAM 0.25 MG/1
TABLET ORAL
Qty: 60 TABLET | Refills: 0 | Status: SHIPPED | OUTPATIENT
Start: 2017-04-06 | End: 2017-05-26 | Stop reason: SDUPTHER

## 2017-04-11 ENCOUNTER — HISTORICAL (OUTPATIENT)
Dept: ADMINISTRATIVE | Facility: HOSPITAL | Age: 77
End: 2017-04-11

## 2017-04-11 LAB — GLUCOSE SERPL-MCNC: 77 MG/DL (ref 70–99)

## 2017-04-17 ENCOUNTER — TELEPHONE (OUTPATIENT)
Dept: HEMATOLOGY/ONCOLOGY | Facility: CLINIC | Age: 77
End: 2017-04-17

## 2017-04-17 NOTE — TELEPHONE ENCOUNTER
----- Message from Chastity Rodriguez sent at 4/13/2017  9:56 AM CDT -----  Contact: daughter, Kerrie Sy wants to speak with a nurse regarding the patient's Pet scan results. Please call back at [phone] (ywiu)

## 2017-04-17 NOTE — TELEPHONE ENCOUNTER
----- Message from Judy Hayes sent at 4/17/2017  8:56 AM CDT -----  Contact:   call  //[phone]    Calling to  Reschedule   Franck  // please call

## 2017-05-03 ENCOUNTER — TELEPHONE (OUTPATIENT)
Dept: PULMONOLOGY | Facility: CLINIC | Age: 77
End: 2017-05-03

## 2017-05-11 DIAGNOSIS — C34.00 MALIGNANT NEOPLASM OF HILUS OF LUNG, UNSPECIFIED LATERALITY: ICD-10-CM

## 2017-05-11 RX ORDER — ALPRAZOLAM 0.25 MG/1
0.25 TABLET ORAL 3 TIMES DAILY PRN
Qty: 60 TABLET | Refills: 0 | Status: SHIPPED | OUTPATIENT
Start: 2017-05-11 | End: 2017-06-08 | Stop reason: SDUPTHER

## 2017-05-23 ENCOUNTER — TELEPHONE (OUTPATIENT)
Dept: FAMILY MEDICINE | Facility: CLINIC | Age: 77
End: 2017-05-23

## 2017-05-23 NOTE — TELEPHONE ENCOUNTER
----- Message from Judy Hayes sent at 5/23/2017  8:03 AM CDT -----  Contact:  call  //[phone]     Calling to  Be  Seen   Earlier  Than   1.00 o clock  , same  Day ,  Nothing available

## 2017-05-23 NOTE — TELEPHONE ENCOUNTER
----- Message from Judy Hayes sent at 5/23/2017  8:03 AM CDT -----  Contact:  call  //[phone]     Calling to  Be  Seen   Earlier  Than   1.00 o clock  , same  Day ,  Nothing available

## 2017-05-24 ENCOUNTER — DOCUMENTATION ONLY (OUTPATIENT)
Dept: FAMILY MEDICINE | Facility: CLINIC | Age: 77
End: 2017-05-24

## 2017-05-24 NOTE — TELEPHONE ENCOUNTER
----- Message from Rachel Salgado sent at 5/23/2017 10:22 AM CDT -----  Contact: self  [phone]  Patient returning a phone call. Call was placed to the pod. Thanks!

## 2017-05-24 NOTE — PROGRESS NOTES
Pre-Visit Chart Review  For Appointment Scheduled on 05/26/2017    Health Maintenance Due   Topic Date Due    Zoster Vaccine  03/03/1998    Pneumococcal (65+) (1 of 2 - PCV13) 03/03/2003    DEXA SCAN  11/01/2016

## 2017-05-26 ENCOUNTER — OFFICE VISIT (OUTPATIENT)
Dept: FAMILY MEDICINE | Facility: CLINIC | Age: 77
End: 2017-05-26
Payer: MEDICARE

## 2017-05-26 VITALS
HEART RATE: 129 BPM | SYSTOLIC BLOOD PRESSURE: 126 MMHG | BODY MASS INDEX: 27.09 KG/M2 | WEIGHT: 143.5 LBS | TEMPERATURE: 98 F | DIASTOLIC BLOOD PRESSURE: 77 MMHG | HEIGHT: 61 IN

## 2017-05-26 DIAGNOSIS — F32.89 OTHER DEPRESSION: ICD-10-CM

## 2017-05-26 DIAGNOSIS — J43.2 CENTRILOBULAR EMPHYSEMA: ICD-10-CM

## 2017-05-26 DIAGNOSIS — R00.0 TACHYCARDIA WITH HEART RATE 100-120 BEATS PER MINUTE: Primary | ICD-10-CM

## 2017-05-26 DIAGNOSIS — C34.91 SMALL CELL LUNG CANCER, RIGHT: ICD-10-CM

## 2017-05-26 PROCEDURE — 93005 ELECTROCARDIOGRAM TRACING: CPT | Mod: S$GLB,,, | Performed by: FAMILY MEDICINE

## 2017-05-26 PROCEDURE — 93010 ELECTROCARDIOGRAM REPORT: CPT | Mod: S$GLB,,, | Performed by: INTERNAL MEDICINE

## 2017-05-26 PROCEDURE — 1159F MED LIST DOCD IN RCRD: CPT | Mod: S$GLB,,, | Performed by: FAMILY MEDICINE

## 2017-05-26 PROCEDURE — 1126F AMNT PAIN NOTED NONE PRSNT: CPT | Mod: S$GLB,,, | Performed by: FAMILY MEDICINE

## 2017-05-26 PROCEDURE — 99214 OFFICE O/P EST MOD 30 MIN: CPT | Mod: S$GLB,,, | Performed by: FAMILY MEDICINE

## 2017-05-26 PROCEDURE — 99999 PR PBB SHADOW E&M-EST. PATIENT-LVL III: CPT | Mod: PBBFAC,,, | Performed by: FAMILY MEDICINE

## 2017-05-26 PROCEDURE — 99499 UNLISTED E&M SERVICE: CPT | Mod: S$PBB,,, | Performed by: FAMILY MEDICINE

## 2017-05-26 RX ORDER — MIRTAZAPINE 30 MG/1
30 TABLET, FILM COATED ORAL NIGHTLY
Qty: 30 TABLET | Refills: 4 | Status: SHIPPED | OUTPATIENT
Start: 2017-05-26 | End: 2017-10-05 | Stop reason: SDUPTHER

## 2017-05-26 RX ORDER — PANTOPRAZOLE SODIUM 40 MG/1
40 TABLET, DELAYED RELEASE ORAL DAILY
Qty: 90 TABLET | Refills: 3 | Status: SHIPPED | OUTPATIENT
Start: 2017-05-26 | End: 2018-04-07 | Stop reason: SDUPTHER

## 2017-05-26 RX ORDER — PREDNISONE 20 MG/1
20 TABLET ORAL DAILY
COMMUNITY
End: 2017-10-23

## 2017-05-26 RX ORDER — SUCRALFATE 1 G/1
1 TABLET ORAL 2 TIMES DAILY
Qty: 120 TABLET | Refills: 3 | Status: SHIPPED | OUTPATIENT
Start: 2017-05-26 | End: 2018-03-02

## 2017-05-26 NOTE — PROGRESS NOTES
" Abnormal CT  SUBJECTIVE:   Delicia Murray is a 77 y.o. female seen follow up  with exacerbation of COPD and Rt upper lung mass. Wheezing is described as mild-moderate. Associated symptoms:coryza, productive cough and mild-moderate shortness of breath with exertion and moderate to severe wheezing with exertion and paroxysmally. Current asthma medications: Proventil MDI (or generic equivalent). Patient admits to smoke cigarettes.Sqamous cell carcinoma.  Patient Active Problem List   Diagnosis    Anemia requiring transfusions    Depression    Insomnia    Colon polyp    Benign neoplasm of colon    GERD (gastroesophageal reflux disease)    Hypokalemia    Iron deficiency    SARAH (iron deficiency anemia)    Anemia, chronic disease    Emphysema lung    Pulmonary fibrosis    Malignant neoplasm of upper lobe of right lung    COPD mixed type         OBJECTIVE: /77 (BP Location: Right arm, Patient Position: Sitting, BP Method: Automatic)   Pulse (!) 129   Temp 98.2 °F (36.8 °C) (Oral)   Ht 5' 1" (1.549 m)   Wt 65.1 kg (143 lb 8.3 oz)   BMI 27.12 kg/m²   The patient appears chronic ill. oriented to person, place, and time, well hydrated, anxious and in mild to moderate distress.  ENT: ENT exam normal, no neck nodes or sinus tenderness, neck without nodes and pharynx erythematous without exudate  CHEST:no tachypnea, retractions or cyanosis, wheezing noted , exp rales.Depression is better, no psychosis.  Lab Results   Component Value Date    WBC 9.30 03/13/2017    HGB 12.9 03/13/2017    HCT 40.3 03/13/2017    MCV 83 03/13/2017     03/13/2017       Chemistry        Component Value Date/Time     03/13/2017 1128    K 3.9 03/13/2017 1128     03/13/2017 1128    CO2 29 03/13/2017 1128    BUN 7 (L) 03/13/2017 1128    CREATININE 0.7 03/13/2017 1128    GLU 93 03/13/2017 1128        Component Value Date/Time    CALCIUM 9.5 03/13/2017 1128    ALKPHOS 137 (H) 03/13/2017 1128    AST 19 03/13/2017 " 1128    ALT 15 03/13/2017 1128    BILITOT 0.3 03/13/2017 1128          ASSESSMENT:   Asthma - acute exacerbation  Tachycardia with heart rate 100-120 beats per minute  -     IN OFFICE EKG 12-LEAD (to Muse)    Other depression  -     mirtazapine (REMERON) 30 MG tablet; Take 1 tablet (30 mg total) by mouth every evening.  Dispense: 30 tablet; Refill: 4    Centrilobular emphysema  -     pantoprazole (PROTONIX) 40 MG tablet; Take 1 tablet (40 mg total) by mouth once daily.  Dispense: 90 tablet; Refill: 3    Small cell lung cancer, right    Other orders  -     sucralfate (CARAFATE) 1 gram tablet; Take 1 tablet (1 g total) by mouth 2 (two) times daily.  Dispense: 120 tablet; Refill: 3      PLAN:   after office therapy,     RX per orders - Use bronchodilator MDI 2 puff q4h prn, steroid MDI regularly to prevent asthma and oral steroid taper.    Additional suggestions to patient: counseled to quit smoking and return if not improved or if worse.

## 2017-06-08 DIAGNOSIS — C34.00 MALIGNANT NEOPLASM OF HILUS OF LUNG, UNSPECIFIED LATERALITY: ICD-10-CM

## 2017-06-08 RX ORDER — ALPRAZOLAM 0.25 MG/1
TABLET ORAL
Qty: 60 TABLET | Refills: 0 | Status: SHIPPED | OUTPATIENT
Start: 2017-06-08 | End: 2017-07-11 | Stop reason: SDUPTHER

## 2017-06-12 ENCOUNTER — OFFICE VISIT (OUTPATIENT)
Dept: RADIATION ONCOLOGY | Facility: CLINIC | Age: 77
End: 2017-06-12
Payer: MEDICARE

## 2017-06-12 VITALS
RESPIRATION RATE: 22 BRPM | TEMPERATURE: 98 F | HEART RATE: 97 BPM | DIASTOLIC BLOOD PRESSURE: 75 MMHG | SYSTOLIC BLOOD PRESSURE: 131 MMHG | HEIGHT: 61 IN | BODY MASS INDEX: 27.66 KG/M2 | WEIGHT: 146.5 LBS

## 2017-06-12 DIAGNOSIS — C34.90 MALIGNANT NEOPLASM OF LUNG, UNSPECIFIED LATERALITY, UNSPECIFIED PART OF LUNG: Primary | ICD-10-CM

## 2017-06-12 DIAGNOSIS — C34.11 MALIGNANT NEOPLASM OF UPPER LOBE OF RIGHT LUNG: ICD-10-CM

## 2017-06-12 PROCEDURE — 99215 OFFICE O/P EST HI 40 MIN: CPT | Mod: ,,, | Performed by: RADIOLOGY

## 2017-06-12 NOTE — PROGRESS NOTES
Delicia Murray  [id number]  1940  6/12/2017  Brianna Ceballos Md  [address] [place] [address]    DIAGNOSIS:     ICD-10-CM ICD-9-CM    1. Malignant neoplasm of lung, unspecified laterality, unspecified part of lung C34.90 162.9 CT Chest Without Contrast   2. Malignant neoplasm of upper lobe of right lung C34.11 162.3      REASON FOR VISIT: Routine scheduled follow-up.    HISTORY OF PRESENT ILLNESS:   Patient here for routine follow-up after undergoing stereotactic radiation therapy for squamous cell cancer of the right upper lobe lung.  She received 50 gr 5 fractions. Treatment completed on 01/20/2017.  Repeat PET scan in April showed a significant reduction in size of the tumor from 2.8 down to 1.4 cm. The SUV or metabolic activity was decreased from 8.2-3.9. There is no evidence of metastatic disease in the chest or distant areas      INTERVAL HISTORY:   Clinically she is doing very well. She does not require oxygen. She has a good appetite and no weight loss. No pain in the bony areas. Mild nonproductive cough without blood    Review of Systems   Constitutional: Negative for chills and diaphoresis.   HENT:   Negative for lump/mass and mouth sores.    Respiratory: Positive for cough. Negative for chest tightness and hemoptysis.    Cardiovascular: Negative for chest pain and leg swelling.   Gastrointestinal: Negative for abdominal pain and blood in stool.   Endocrine: Negative for hot flashes.   Genitourinary: Negative for frequency and hematuria.    Musculoskeletal: Negative for arthralgias, back pain and gait problem.   Skin: Negative for itching and rash.   Neurological: Negative for gait problem and headaches.   Hematological: Negative for adenopathy. Does not bruise/bleed easily.   Psychiatric/Behavioral: Negative for confusion. The patient is not nervous/anxious.      Past Medical History:   Diagnosis Date    Anemia     Blood transfusion     Colon polyps     Insomnia      Past Surgical  History:   Procedure Laterality Date    broken jaw      left    CHOLECYSTECTOMY      HYSTERECTOMY      SKIN BIOPSY      TONSILLECTOMY       Social History     Social History    Marital status:      Spouse name: N/A    Number of children: N/A    Years of education: N/A     Social History Main Topics    Smoking status: Former Smoker     Packs/day: 0.50     Years: 60.00    Smokeless tobacco: Former User     Quit date: 10/3/2016    Alcohol use Yes      Comment: occasional    Drug use: No    Sexual activity: Not Currently     Other Topics Concern    None     Social History Narrative    None     Family History   Problem Relation Age of Onset    Heart disease Mother     Cancer Father 69     unknown     Medication List with Changes/Refills   Current Medications    ACETAMINOPHEN (TYLENOL) 325 MG TABLET    Take 325 mg by mouth every 6 (six) hours as needed.    ALPRAZOLAM (XANAX) 0.25 MG TABLET    TAKE 1 TABLET BY MOUTH THREE TIMES DAILY AS NEEDED    MIRTAZAPINE (REMERON) 30 MG TABLET    Take 1 tablet (30 mg total) by mouth every evening.    PANTOPRAZOLE (PROTONIX) 40 MG TABLET    Take 1 tablet (40 mg total) by mouth once daily.    PREDNISONE (DELTASONE) 20 MG TABLET    Take 20 mg by mouth once daily.    SERTRALINE (ZOLOFT) 100 MG TABLET    Take 1 tablet (100 mg total) by mouth every evening.    SUCRALFATE (CARAFATE) 1 GRAM TABLET    Take 1 tablet (1 g total) by mouth 2 (two) times daily.    UMECLIDINIUM-VILANTEROL (ANORO ELLIPTA) 62.5-25 MCG/ACTUATION DSDV    Inhale 1 puff into the lungs once daily.     Review of patient's allergies indicates:   Allergen Reactions    Ferrlecit [sodium ferric gluconat-sucrose]      Generalized rash, pruritus    Sulfa (sulfonamide antibiotics)     Adhesive Rash       QUALITY OF LIFE: 80%- Normal Activity with Effort: Some Symptoms of Disease    Vitals:    06/12/17 1039   BP: 131/75   Pulse: 97   Resp: (!) 22   Temp: 98.2 °F (36.8 °C)   TempSrc: Oral   Weight: 66.5 kg  "(146 lb 8 oz)   Height: 5' 1" (1.549 m)   PainSc: 0-No pain       PHYSICAL EXAM:  GENERAL: alert; in no apparent distress.   HEAD: normocephalic, atraumatic.  EYES: pupils are equal, round, reactive to light and accommodation. Sclera anicteric. Conjunctiva not injected.   NOSE/THROAT: no nasal erythema or rhinorrhea. Oropharynx pink, without erythema, ulcerations or thrush.   NECK: no cervical motion rigidity; supple with no masses.  CHEST: clear to auscultation bilaterally; Mild bilateral wheezing wheezing is noted. No consolidation to percussion Patient is speaking comfortably on room air with normal work of breathing without using accessory muscles of respiration.  CARDIOVASCULAR: regular rate and rhythm; no murmurs, rubs or gallops.  ABDOMEN: soft, nontender, nondistended. Bowel sounds present.   MUSCULOSKELETAL: no tenderness to palpation along the spine or scapulae. Normal range of motion.  NEUROLOGIC: cranial nerves II-XII intact bilaterally. Strength 5/5 in bilateral upper and lower extremities. No sensory deficits appreciated. Reflexes globally intact. No cerebellar signs. Normal gait.  LYMPHATIC: no cervical, supraclavicular or axillary adenopathy appreciated bilaterally.   EXTREMITIES: no clubbing, cyanosis, edema.  SKIN: no erythema, rashes, ulcerations noted.     ANCILLARY DATA:     ASSESSMENT: Patient stable and doing well post SRT to a right upper lobe lung mass with significant reduction in size SUV rating.  PLAN:  Recommend repeat CT scan of the chest in 2 months. I will have her follow up with me showing thereafter    All questions answered and contact information provided. Patient understands free to call us anytime with any questions or concerns regarding radiation therapy.    TIME SPENT WITH PATIENT: I have personally seen and evaluated this patient. Approximately 30 minutes were spent with the patient discussing follow-up careplan.     PHYSICIAN: Buster Siegel MD      "

## 2017-06-16 ENCOUNTER — OFFICE VISIT (OUTPATIENT)
Dept: PULMONOLOGY | Facility: CLINIC | Age: 77
End: 2017-06-16
Payer: MEDICARE

## 2017-06-16 VITALS
SYSTOLIC BLOOD PRESSURE: 129 MMHG | BODY MASS INDEX: 27.64 KG/M2 | HEART RATE: 111 BPM | DIASTOLIC BLOOD PRESSURE: 73 MMHG | WEIGHT: 146.38 LBS | HEIGHT: 61 IN | OXYGEN SATURATION: 93 %

## 2017-06-16 DIAGNOSIS — J84.10 PULMONARY FIBROSIS: Primary | ICD-10-CM

## 2017-06-16 DIAGNOSIS — J47.9 BRONCHIECTASIS WITHOUT COMPLICATION: ICD-10-CM

## 2017-06-16 DIAGNOSIS — J43.9 PULMONARY EMPHYSEMA, UNSPECIFIED EMPHYSEMA TYPE: ICD-10-CM

## 2017-06-16 DIAGNOSIS — C34.11 MALIGNANT NEOPLASM OF UPPER LOBE OF RIGHT LUNG: ICD-10-CM

## 2017-06-16 PROCEDURE — 99214 OFFICE O/P EST MOD 30 MIN: CPT | Mod: S$GLB,,, | Performed by: INTERNAL MEDICINE

## 2017-06-16 PROCEDURE — 1159F MED LIST DOCD IN RCRD: CPT | Mod: S$GLB,,, | Performed by: INTERNAL MEDICINE

## 2017-06-16 PROCEDURE — 1126F AMNT PAIN NOTED NONE PRSNT: CPT | Mod: S$GLB,,, | Performed by: INTERNAL MEDICINE

## 2017-06-16 PROCEDURE — 99999 PR PBB SHADOW E&M-EST. PATIENT-LVL III: CPT | Mod: PBBFAC,,, | Performed by: INTERNAL MEDICINE

## 2017-06-16 PROCEDURE — 99499 UNLISTED E&M SERVICE: CPT | Mod: S$PBB,,, | Performed by: INTERNAL MEDICINE

## 2017-06-16 RX ORDER — PREDNISONE 10 MG/1
10 TABLET ORAL DAILY
Qty: 10 TABLET | Refills: 2 | Status: SHIPPED | OUTPATIENT
Start: 2017-06-16 | End: 2017-06-26

## 2017-06-16 RX ORDER — ALBUTEROL SULFATE 90 UG/1
AEROSOL, METERED RESPIRATORY (INHALATION)
Qty: 1 INHALER | Refills: 11 | Status: SHIPPED | OUTPATIENT
Start: 2017-06-16

## 2017-06-16 NOTE — PATIENT INSTRUCTIONS
Would  Like to compare ct chest for fibrosis especially if cancer is stable to consider treatment for pulmonary fibrosis.    Will use prednisone 10mg - 3 x 3 , 2 x 3 , etc.

## 2017-06-16 NOTE — PROGRESS NOTES
"6/16/2017    Delicia Murray  New Patient Consult    Chief Complaint   Patient presents with    Follow-up     4 wk    Pulmonary Fibrosis    Lung Mass    Wheezing    Cough       June 16, still intermittent wheezes and prednisone helps but bloats.  Riley only .  For ct in august with dr rosenthal.  Riley same or better than last yr.      Feb 6,  Has some riley, elected for xrt- completed at cancer center on [address].  Feels well.  Fatigue ok , and iron levels up.  Anxiety good.        Nov 4, 2016HPI: i cared for pt's  in hosp.  Pt smoked past, has emphysema on no routine meds.  Had cough and felt poorly a month ago.  H/o chr iron def and freq fatigue.  Had lesion found right lung and had mass. Pet active.  No recent wt loss or bone pain.     anxiety on zoloft,         The chief compliant  problem is new to me",   PFSH:  Past Medical History:   Diagnosis Date    Anemia     Blood transfusion     Colon polyps     Insomnia          Past Surgical History:   Procedure Laterality Date    broken jaw      left    CHOLECYSTECTOMY      HYSTERECTOMY      SKIN BIOPSY      TONSILLECTOMY       Social History   Substance Use Topics    Smoking status: Former Smoker     Packs/day: 0.50     Years: 60.00    Smokeless tobacco: Former User     Quit date: 10/3/2016    Alcohol use Yes      Comment: occasional     Family History   Problem Relation Age of Onset    Heart disease Mother     Cancer Father 69     unknown     Review of patient's allergies indicates:   Allergen Reactions    Ferrlecit [sodium ferric gluconat-sucrose]      Generalized rash, pruritus    Sulfa (sulfonamide antibiotics)     Adhesive Rash       Performance Status:The patient's activity level is functions out of house.      Review of Systems:  a review of eleven systems covering constitutional, Eye, HEENT, Psych, Respiratory, Cardiac, GI, , Musculoskeletal, Endocrine, Dermatologic was negative except for pertinent findings as listed ABOVE and below: " "iron def and anxiety and copd..     Exam:Comprehensive exam done.   /73 (BP Location: Right arm, Patient Position: Sitting, BP Method: Automatic)   Pulse (!) 111   Ht 5' 1" (1.549 m)   Wt 66.4 kg (146 lb 6.2 oz)   SpO2 (!) 93%   BMI 27.66 kg/m²   Exam included Vitals as listed, and patient's appearance and affect and alertness and mood, oral exam for yeast and hygiene and pharynx lesions and Mallapatti (M) score, neck with inspection for jvd and masses and thyroid abnormalities and lymph nodes (supraclavicular and infraclavicular nodes and axillary also examined and noted if abn), chest exam included symmetry and effort and fremitus and percussion and auscultation, cardiac exam included rhythm and gallops and murmur and rubs and jvd and edema, abdominal exam for mass and hepatosplenomegaly and tenderness and hernias and bowel sounds, Musculoskeletal exam with muscle tone and posture and mobility/gait and  strength, and skin for rashes and cyanosis and pallor and turgor, extremity for clubbing.  Findings were normal except for pertinent findings listed below:  M2 and 1/4 up rale with occ wheezes and good    Radiographs (ct chest and cxr) reviewed: view by direct vision 2013 and 2016 cxr with new rul mass, ild and emphysema seen. Ct viewed with nodes, pet viewed also    Axial scans of the chest were obtained without IV contrast    There are scattered lucencies and numerous scattered blebs including multiple peripheral subpleural blebs and there is peripheral intralobular septal thickening.  corresponding as seen on the recent chest x-ray of 10/10/2016 there is an irregular approximately 1.6 x 1.1 0.6 cm mass with spiculated indistinct margins extending to the right lateral chest wall with thickening of the overlying pleura.  More mild pleural thickening is seen elsewhere.    There are mildly prominent mediastinal lymph nodes short axis diameter precarinal node 9 mm.  There is old granulomatous disease " with calcified hilar nodes.  There is moderately large hiatal hernia.  There are Calcified granulomas are seen in the liver and spleen and there surgical clips in the gallbladder fossa.  There are two juxtapose or a lobular circumscribed 1.5 cm mass in the liver suggesting cyst and note is made that ultrasound abdomen 3/31/2015 do describe cysts.  Visualized abdominal aorta appears mildly ectatic.  There are degenerative changes in the spine and the skeletal structures are osteopenic and there is mild loss of height of a few vertebra near the thoracolumbar junction:       Impression          Emphysematous change and findings consistent pulmonary fibrosis appearing severe.  Spiculated density right upper lobe suspicious for neoplasm.  differential includes pneumonia and recommend correlation clinically particularly if clinical consideration for pneumonia short interval follow could be considered to determine if this resolves.      Electronically signed by: VINNIE CROWLEY MD  Date: 10/17/16  Time: 14:12          Labs reviewed from last CBC,CMP,ABG,Micro last 3 entries  on EPIC result review    hbg close to 8    PFT results reviewed fev 1.06-1.15 and dlco 54%    Plan:  Clinical impression is resonably certain and repeated evaluation prn +/- follow up will be needed as below.    Delicia was seen today for follow-up, pulmonary fibrosis, lung mass, wheezing and cough.    Diagnoses and all orders for this visit:    Pulmonary fibrosis    Pulmonary emphysema, unspecified emphysema type  -     predniSONE (DELTASONE) 10 MG tablet; Take 1 tablet (10 mg total) by mouth once daily. Take 3 daily for 3 days then 2 daily for 3 days then one daily for 3 days, repeat if needed.  -     umeclidinium-vilanterol (ANORO ELLIPTA) 62.5-25 mcg/actuation DsDv; Inhale 1 puff into the lungs once daily.  -     albuterol 90 mcg/actuation inhaler; 2 puffs every 4 hours as needed for cough, wheeze, or shortness of breath    Malignant neoplasm of  upper lobe of right lung    Bronchiectasis without complication  -     predniSONE (DELTASONE) 10 MG tablet; Take 1 tablet (10 mg total) by mouth once daily. Take 3 daily for 3 days then 2 daily for 3 days then one daily for 3 days, repeat if needed.  -     umeclidinium-vilanterol (ANORO ELLIPTA) 62.5-25 mcg/actuation DsDv; Inhale 1 puff into the lungs once daily.  -     albuterol 90 mcg/actuation inhaler; 2 puffs every 4 hours as needed for cough, wheeze, or shortness of breath        Return in about 3 months (around 9/16/2017).    Discussed with patient above for education the following:    Would  Like to compare ct chest for fibrosis especially if cancer is stable to consider treatment for pulmonary fibrosis.    Will use prednisone 10mg - 3 x 3 , 2 x 3 , etc.

## 2017-07-10 ENCOUNTER — LAB VISIT (OUTPATIENT)
Dept: LAB | Facility: HOSPITAL | Age: 77
End: 2017-07-10
Attending: INTERNAL MEDICINE
Payer: MEDICARE

## 2017-07-10 DIAGNOSIS — D50.0 IRON DEFICIENCY ANEMIA DUE TO CHRONIC BLOOD LOSS: Chronic | ICD-10-CM

## 2017-07-10 DIAGNOSIS — C34.11 MALIGNANT NEOPLASM OF UPPER LOBE OF RIGHT LUNG: ICD-10-CM

## 2017-07-10 LAB
ALBUMIN SERPL BCP-MCNC: 3.1 G/DL
ALP SERPL-CCNC: 159 U/L
ALT SERPL W/O P-5'-P-CCNC: 11 U/L
ANION GAP SERPL CALC-SCNC: 9 MMOL/L
AST SERPL-CCNC: 17 U/L
BASOPHILS # BLD AUTO: 0 K/UL
BASOPHILS NFR BLD: 0.4 %
BILIRUB SERPL-MCNC: 0.2 MG/DL
BUN SERPL-MCNC: 9 MG/DL
CALCIUM SERPL-MCNC: 9.6 MG/DL
CHLORIDE SERPL-SCNC: 103 MMOL/L
CO2 SERPL-SCNC: 29 MMOL/L
CREAT SERPL-MCNC: 0.8 MG/DL
DIFFERENTIAL METHOD: ABNORMAL
EOSINOPHIL # BLD AUTO: 0.3 K/UL
EOSINOPHIL NFR BLD: 3.7 %
ERYTHROCYTE [DISTWIDTH] IN BLOOD BY AUTOMATED COUNT: 15.6 %
EST. GFR  (AFRICAN AMERICAN): >60 ML/MIN/1.73 M^2
EST. GFR  (NON AFRICAN AMERICAN): >60 ML/MIN/1.73 M^2
FERRITIN SERPL-MCNC: 15 NG/ML
GLUCOSE SERPL-MCNC: 98 MG/DL
HCT VFR BLD AUTO: 36.3 %
HGB BLD-MCNC: 11.9 G/DL
IRON SERPL-MCNC: 24 UG/DL
LYMPHOCYTES # BLD AUTO: 1.8 K/UL
LYMPHOCYTES NFR BLD: 20.5 %
MCH RBC QN AUTO: 25.8 PG
MCHC RBC AUTO-ENTMCNC: 32.8 %
MCV RBC AUTO: 79 FL
MONOCYTES # BLD AUTO: 0.6 K/UL
MONOCYTES NFR BLD: 6.8 %
NEUTROPHILS # BLD AUTO: 6 K/UL
NEUTROPHILS NFR BLD: 68.6 %
PLATELET # BLD AUTO: 332 K/UL
PMV BLD AUTO: 7.2 FL
POTASSIUM SERPL-SCNC: 4.3 MMOL/L
PROT SERPL-MCNC: 7.5 G/DL
RBC # BLD AUTO: 4.62 M/UL
SATURATED IRON: 5 %
SODIUM SERPL-SCNC: 141 MMOL/L
TOTAL IRON BINDING CAPACITY: 491 UG/DL
TRANSFERRIN SERPL-MCNC: 332 MG/DL
WBC # BLD AUTO: 8.7 K/UL

## 2017-07-10 PROCEDURE — 85025 COMPLETE CBC W/AUTO DIFF WBC: CPT

## 2017-07-10 PROCEDURE — 82728 ASSAY OF FERRITIN: CPT

## 2017-07-10 PROCEDURE — 36415 COLL VENOUS BLD VENIPUNCTURE: CPT

## 2017-07-10 PROCEDURE — 84238 ASSAY NONENDOCRINE RECEPTOR: CPT

## 2017-07-10 PROCEDURE — 80053 COMPREHEN METABOLIC PANEL: CPT

## 2017-07-10 PROCEDURE — 83540 ASSAY OF IRON: CPT

## 2017-07-11 DIAGNOSIS — C34.00 MALIGNANT NEOPLASM OF HILUS OF LUNG, UNSPECIFIED LATERALITY: ICD-10-CM

## 2017-07-11 LAB — STFR SERPL-MCNC: 10.3 MG/L

## 2017-07-11 RX ORDER — ALPRAZOLAM 0.25 MG/1
TABLET ORAL
Qty: 60 TABLET | Refills: 0 | Status: SHIPPED | OUTPATIENT
Start: 2017-07-11 | End: 2017-08-10 | Stop reason: SDUPTHER

## 2017-07-17 ENCOUNTER — OFFICE VISIT (OUTPATIENT)
Dept: HEMATOLOGY/ONCOLOGY | Facility: CLINIC | Age: 77
End: 2017-07-17
Payer: MEDICARE

## 2017-07-17 VITALS
HEIGHT: 61 IN | WEIGHT: 149.25 LBS | HEART RATE: 94 BPM | SYSTOLIC BLOOD PRESSURE: 141 MMHG | TEMPERATURE: 99 F | DIASTOLIC BLOOD PRESSURE: 57 MMHG | BODY MASS INDEX: 28.18 KG/M2

## 2017-07-17 DIAGNOSIS — J44.9 COPD MIXED TYPE: Primary | ICD-10-CM

## 2017-07-17 DIAGNOSIS — D50.0 IRON DEFICIENCY ANEMIA DUE TO CHRONIC BLOOD LOSS: Chronic | ICD-10-CM

## 2017-07-17 DIAGNOSIS — C34.11 MALIGNANT NEOPLASM OF UPPER LOBE OF RIGHT LUNG: ICD-10-CM

## 2017-07-17 DIAGNOSIS — J43.9 PULMONARY EMPHYSEMA, UNSPECIFIED EMPHYSEMA TYPE: ICD-10-CM

## 2017-07-17 PROCEDURE — 99999 PR PBB SHADOW E&M-EST. PATIENT-LVL III: CPT | Mod: PBBFAC,,, | Performed by: INTERNAL MEDICINE

## 2017-07-17 PROCEDURE — 1126F AMNT PAIN NOTED NONE PRSNT: CPT | Mod: S$GLB,,, | Performed by: INTERNAL MEDICINE

## 2017-07-17 PROCEDURE — 99214 OFFICE O/P EST MOD 30 MIN: CPT | Mod: S$GLB,,, | Performed by: INTERNAL MEDICINE

## 2017-07-17 PROCEDURE — 1159F MED LIST DOCD IN RCRD: CPT | Mod: S$GLB,,, | Performed by: INTERNAL MEDICINE

## 2017-07-17 PROCEDURE — 99499 UNLISTED E&M SERVICE: CPT | Mod: S$PBB,,, | Performed by: INTERNAL MEDICINE

## 2017-07-17 RX ORDER — MIRTAZAPINE 15 MG/1
TABLET, FILM COATED ORAL
COMMUNITY
Start: 2017-07-13 | End: 2017-07-17

## 2017-07-17 NOTE — PROGRESS NOTES
Subjective:       Patient ID: Delicia Murray is a 77 y.o. female.    Chief Complaint: Follow-up (3 month)    HPI:   Pt last saw me in 6/2016 . Has been following on a 3 monthly schedule for SARAH, heavy menses in her youth, had scopes and w/u did well after iron infusion. Went to Dr. Duncan's clinic for copd exacerbation and feeling poorly, cxr revealed rt lung mass . Pt was ref. To Dr. Chowdary pulmonolgy and then to Dr. velazquez with whom she underwent FNA of this mass.here to discuss results with her daughters. Sq cell ca, went back to f/u with DR. Velazquez, lung probably wont have good outcome from resection.  Chose to go with xrt. Has done 3 wont scan that looked improved and has another scan on the 7th of august. Had upper and lower GI done 2014, we determined that she may need iron infusions frequently due to poor iron absorption , pt has not had a GI f/u since 2014 and wants to wait as since then she has had a lung ca dx and has been working with it    REVIEW OF SYSTEMS:     CONSTITUTIONAL: SKIN: Denies rash, issues with nails, non-healing sores, bleeding, blotching    skin or abnormal bruising. Denies new moles or changes to existing moles.      BREASTS: There is no swelling around breasts or nipple discharge.    EYES: Denies eye pain, blurred vision, swelling, redness or discharge.    CARDIOVASCULAR: Denies chest pain, discomfort or palpitations. Denies neck    swelling or episodes of passing out.      RESPIRATORY: Denies cough, sputum production, blood in sputum, and denies    shortness of breath.      GI: Denies trouble swallowing, indigestion, heartburn, abdominal pain, nausea,    vomiting, diarrhea, altered bowel habits, blood in stool, discoloration of    stools, change in nature of stool, bloating, increased abdominal girth.      GENITOURINARY: No discharge. No pelvic pain or lumps. No rash around groin or  lesions. No urinary frequency, hesitation, painful urination or blood in    urine. Denies  incontinence. No problems with intercourse.      MUSCULOSKELETAL: Denies neck or back pain. Denies weakness in arms or legs,    joint problems or distended inflamed veins in legs. Denies swelling or abnormal  glands.      NEUROLOGICAL: Denies tingling, numbness, altered mentation changes to nerve    function in the face, weakness to one or both of the body. Denies changes to    gait and denies multiple falls or accidents.        PHYSICAL EXAM:     Vitals:    07/17/17 0912   BP: (!) 141/57   Pulse: 94   Temp: 98.8 °F (37.1 °C)       GENERAL: Comfortable looking patient. Patient is in no distress.  Awake, alert and oriented to time, person and place.  No anxiety, or agitation.      HEENT: Normal conjunctivae and eyelids. WNL.  PERRLA 3 to 4 mm. No icterus, no pallor, no congestion, and no discharge noted.     NECK:  Supple. Trachea is central.  No crepitus.  No JVD or masses.    RESPIRATORY:  No intercostal retractions.  No dullness to percussion.  Chest is clear to auscultation.  No rales, rhonchi or wheezes.  No crepitus.  Good air entry bilaterally.    CARDIOVASCULAR:  S1 and S2 are normally heard without murmurs or gallops.  All peripheral pulses are present.    ABDOMEN:  Normal abdomen.  No hepatosplenomegaly.  No free fluid.  Bowel sounds are present.  No hernia noted. No masses.  No rebound or tenderness.  No guarding or rigidity.  Umbilicus is midline.    LYMPHATICS:  No axillary, cervical, supraclavicular, submental, or inguinal lymphadenopathy.    SKIN/MUSCULOSKELETAL:  There is no evidence of excoriation marks or ecchmosis.  No rashes.  No cyanosis.  No clubbing.  No joint or skeletal deformities noted.  Normal range of motion.    NEUROLOGIC:  Higher functions are appropriate.  No cranial nerve deficits.  Normal nagi.  Normal strength.  Motor and sensory functions are normal.  Deep tendon reflexes are normal.    GENITAL/RECTAL:  Exams are deferred.      Laboratory:     CBC:  Lab Results   Component Value  Date    WBC 8.70 07/10/2017    RBC 4.62 07/10/2017    HGB 11.9 (L) 07/10/2017    HCT 36.3 (L) 07/10/2017    MCV 79 (L) 07/10/2017    MCH 25.8 (L) 07/10/2017    MCHC 32.8 07/10/2017    RDW 15.6 (H) 07/10/2017     07/10/2017    MPV 7.2 (L) 07/10/2017    GRAN 6.0 07/10/2017    GRAN 68.6 07/10/2017    LYMPH 1.8 07/10/2017    LYMPH 20.5 07/10/2017    MONO 0.6 07/10/2017    MONO 6.8 07/10/2017    EOS 0.3 07/10/2017    BASO 0.00 07/10/2017    EOSINOPHIL 3.7 07/10/2017    BASOPHIL 0.4 07/10/2017       BMP: BMP  Lab Results   Component Value Date     07/10/2017    K 4.3 07/10/2017     07/10/2017    CO2 29 07/10/2017    BUN 9 07/10/2017    CREATININE 0.8 07/10/2017    CALCIUM 9.6 07/10/2017    ANIONGAP 9 07/10/2017    ESTGFRAFRICA >60 07/10/2017    EGFRNONAA >60 07/10/2017       LFT:   Lab Results   Component Value Date    ALT 11 07/10/2017    AST 17 07/10/2017    ALKPHOS 159 (H) 07/10/2017    BILITOT 0.2 07/10/2017     Lab Results   Component Value Date    IRON 24 (L) 07/10/2017    TIBC 491 (H) 07/10/2017    FERRITIN 15 (L) 07/10/2017       Assessment/Plan:     FINAL PATHOLOGIC DIAGNOSIS  CT-GUIDED CORE NEEDLE BIOPSY OF THE RIGHT UPPER LOBE:  POSITIVE FOR SQUAMOUS CELL CARCINOMA     Assessment/Plan:       1. Malignant neoplasm of hilus of lung, unspecified laterality        2. SARAH , needs ijectafer 2 doses  3. Anxiety, depression  4.GERDS would recommend GI f/u at some point . Pt doesn't want it now , she is tired and had a lot going on  . PET scan reviwed from [facility], has another scan for aug 7th ordered by xrt  Pt has completed xrt rx to lung 1/20.  Continues f/u with DR. Epi sands to  see Dr. rosenthal    rtc 1 months with cbc, cmp iron studies

## 2017-07-24 RX ORDER — HEPARIN 100 UNIT/ML
5 SYRINGE INTRAVENOUS
Status: CANCELLED | OUTPATIENT
Start: 2017-07-24

## 2017-07-24 RX ORDER — SODIUM CHLORIDE 9 MG/ML
INJECTION, SOLUTION INTRAVENOUS CONTINUOUS
Status: CANCELLED | OUTPATIENT
Start: 2017-07-24

## 2017-07-24 RX ORDER — SODIUM CHLORIDE 0.9 % (FLUSH) 0.9 %
10 SYRINGE (ML) INJECTION
Status: CANCELLED | OUTPATIENT
Start: 2017-07-24

## 2017-07-27 ENCOUNTER — DOCUMENTATION ONLY (OUTPATIENT)
Dept: HEMATOLOGY/ONCOLOGY | Facility: CLINIC | Age: 77
End: 2017-07-27

## 2017-07-31 RX ORDER — SODIUM CHLORIDE 0.9 % (FLUSH) 0.9 %
10 SYRINGE (ML) INJECTION
Status: CANCELLED | OUTPATIENT
Start: 2017-07-31

## 2017-07-31 RX ORDER — HEPARIN 100 UNIT/ML
5 SYRINGE INTRAVENOUS
Status: CANCELLED | OUTPATIENT
Start: 2017-07-31

## 2017-07-31 RX ORDER — SODIUM CHLORIDE 9 MG/ML
INJECTION, SOLUTION INTRAVENOUS CONTINUOUS
Status: CANCELLED | OUTPATIENT
Start: 2017-07-31

## 2017-08-03 ENCOUNTER — DOCUMENTATION ONLY (OUTPATIENT)
Dept: HEMATOLOGY/ONCOLOGY | Facility: CLINIC | Age: 77
End: 2017-08-03

## 2017-08-07 ENCOUNTER — TELEPHONE (OUTPATIENT)
Dept: RADIATION ONCOLOGY | Facility: CLINIC | Age: 77
End: 2017-08-07

## 2017-08-07 ENCOUNTER — DOCUMENTATION ONLY (OUTPATIENT)
Dept: RADIATION ONCOLOGY | Facility: CLINIC | Age: 77
End: 2017-08-07

## 2017-08-07 DIAGNOSIS — C34.11 MALIGNANT NEOPLASM OF UPPER LOBE OF RIGHT LUNG: Primary | ICD-10-CM

## 2017-08-07 NOTE — TELEPHONE ENCOUNTER
Patient underwent CT scan of the chest for evaluation of lung tumor treated with radiation therapy in January of this year..  The right sided lesion is showing some increase in size as well as a second lesion on the right.    Because of this I will go ahead and order a PET scan to see if there is activity in this tissue versus increased scar tissue from the radiation treatment.  I discussed this with the patient by phone.    Will go ahead and order a PET scan for her within the next week.

## 2017-08-10 DIAGNOSIS — C34.00 MALIGNANT NEOPLASM OF HILUS OF LUNG, UNSPECIFIED LATERALITY: ICD-10-CM

## 2017-08-11 RX ORDER — ALPRAZOLAM 0.25 MG/1
TABLET ORAL
Qty: 60 TABLET | Refills: 0 | Status: SHIPPED | OUTPATIENT
Start: 2017-08-11 | End: 2017-09-10 | Stop reason: SDUPTHER

## 2017-08-14 LAB — GLUCOSE SERPL-MCNC: 120 MG/DL (ref 70–99)

## 2017-08-16 ENCOUNTER — LAB VISIT (OUTPATIENT)
Dept: LAB | Facility: HOSPITAL | Age: 77
End: 2017-08-16
Attending: INTERNAL MEDICINE
Payer: MEDICARE

## 2017-08-16 DIAGNOSIS — D50.0 IRON DEFICIENCY ANEMIA DUE TO CHRONIC BLOOD LOSS: Chronic | ICD-10-CM

## 2017-08-16 DIAGNOSIS — C34.11 MALIGNANT NEOPLASM OF UPPER LOBE OF RIGHT LUNG: ICD-10-CM

## 2017-08-16 LAB
ALBUMIN SERPL BCP-MCNC: 3.3 G/DL
ALP SERPL-CCNC: 172 U/L
ALT SERPL W/O P-5'-P-CCNC: 19 U/L
ANION GAP SERPL CALC-SCNC: 10 MMOL/L
ANISOCYTOSIS BLD QL SMEAR: ABNORMAL
AST SERPL-CCNC: 19 U/L
BASOPHILS # BLD AUTO: 0.2 K/UL
BASOPHILS NFR BLD: 1.8 %
BILIRUB SERPL-MCNC: 0.4 MG/DL
BUN SERPL-MCNC: 6 MG/DL
CALCIUM SERPL-MCNC: 9.9 MG/DL
CHLORIDE SERPL-SCNC: 103 MMOL/L
CO2 SERPL-SCNC: 25 MMOL/L
CREAT SERPL-MCNC: 0.7 MG/DL
DIFFERENTIAL METHOD: ABNORMAL
EOSINOPHIL # BLD AUTO: 0.4 K/UL
EOSINOPHIL NFR BLD: 4.1 %
ERYTHROCYTE [DISTWIDTH] IN BLOOD BY AUTOMATED COUNT: 22.7 %
EST. GFR  (AFRICAN AMERICAN): >60 ML/MIN/1.73 M^2
EST. GFR  (NON AFRICAN AMERICAN): >60 ML/MIN/1.73 M^2
FERRITIN SERPL-MCNC: 890 NG/ML
GLUCOSE SERPL-MCNC: 108 MG/DL
HCT VFR BLD AUTO: 44.5 %
HGB BLD-MCNC: 14.4 G/DL
HYPOCHROMIA BLD QL SMEAR: ABNORMAL
IRON SERPL-MCNC: 78 UG/DL
LYMPHOCYTES # BLD AUTO: 1.9 K/UL
LYMPHOCYTES NFR BLD: 19.6 %
MCH RBC QN AUTO: 26.3 PG
MCHC RBC AUTO-ENTMCNC: 32.4 G/DL
MCV RBC AUTO: 81 FL
MONOCYTES # BLD AUTO: 0.5 K/UL
MONOCYTES NFR BLD: 5.6 %
NEUTROPHILS # BLD AUTO: 6.6 K/UL
NEUTROPHILS NFR BLD: 68.9 %
PLATELET # BLD AUTO: 258 K/UL
PLATELET BLD QL SMEAR: ABNORMAL
PMV BLD AUTO: 7.8 FL
POIKILOCYTOSIS BLD QL SMEAR: SLIGHT
POTASSIUM SERPL-SCNC: 4.1 MMOL/L
PROT SERPL-MCNC: 7.8 G/DL
RBC # BLD AUTO: 5.47 M/UL
SATURATED IRON: 21 %
SODIUM SERPL-SCNC: 138 MMOL/L
TOTAL IRON BINDING CAPACITY: 366 UG/DL
TRANSFERRIN SERPL-MCNC: 247 MG/DL
WBC # BLD AUTO: 9.5 K/UL

## 2017-08-16 PROCEDURE — 80053 COMPREHEN METABOLIC PANEL: CPT

## 2017-08-16 PROCEDURE — 82728 ASSAY OF FERRITIN: CPT

## 2017-08-16 PROCEDURE — 84238 ASSAY NONENDOCRINE RECEPTOR: CPT

## 2017-08-16 PROCEDURE — 83540 ASSAY OF IRON: CPT

## 2017-08-16 PROCEDURE — 85025 COMPLETE CBC W/AUTO DIFF WBC: CPT

## 2017-08-16 PROCEDURE — 36415 COLL VENOUS BLD VENIPUNCTURE: CPT

## 2017-08-17 LAB — STFR SERPL-MCNC: 6.7 MG/L

## 2017-08-23 ENCOUNTER — TELEPHONE (OUTPATIENT)
Dept: HEMATOLOGY/ONCOLOGY | Facility: CLINIC | Age: 77
End: 2017-08-23

## 2017-08-23 ENCOUNTER — OFFICE VISIT (OUTPATIENT)
Dept: HEMATOLOGY/ONCOLOGY | Facility: CLINIC | Age: 77
End: 2017-08-23
Payer: MEDICARE

## 2017-08-23 VITALS
DIASTOLIC BLOOD PRESSURE: 58 MMHG | HEIGHT: 61 IN | BODY MASS INDEX: 28.22 KG/M2 | WEIGHT: 149.5 LBS | RESPIRATION RATE: 17 BRPM | TEMPERATURE: 98 F | HEART RATE: 106 BPM | SYSTOLIC BLOOD PRESSURE: 119 MMHG

## 2017-08-23 DIAGNOSIS — D63.8 ANEMIA, CHRONIC DISEASE: Primary | ICD-10-CM

## 2017-08-23 DIAGNOSIS — D50.0 IRON DEFICIENCY ANEMIA DUE TO CHRONIC BLOOD LOSS: Chronic | ICD-10-CM

## 2017-08-23 DIAGNOSIS — C34.90 MALIGNANT NEOPLASM OF LUNG, UNSPECIFIED LATERALITY, UNSPECIFIED PART OF LUNG: Primary | ICD-10-CM

## 2017-08-23 DIAGNOSIS — C34.11 MALIGNANT NEOPLASM OF UPPER LOBE OF RIGHT LUNG: ICD-10-CM

## 2017-08-23 DIAGNOSIS — J44.9 COPD MIXED TYPE: ICD-10-CM

## 2017-08-23 PROCEDURE — 99214 OFFICE O/P EST MOD 30 MIN: CPT | Mod: S$GLB,,, | Performed by: INTERNAL MEDICINE

## 2017-08-23 PROCEDURE — 3008F BODY MASS INDEX DOCD: CPT | Mod: S$GLB,,, | Performed by: INTERNAL MEDICINE

## 2017-08-23 PROCEDURE — 1126F AMNT PAIN NOTED NONE PRSNT: CPT | Mod: S$GLB,,, | Performed by: INTERNAL MEDICINE

## 2017-08-23 PROCEDURE — 99499 UNLISTED E&M SERVICE: CPT | Mod: S$PBB,,, | Performed by: INTERNAL MEDICINE

## 2017-08-23 PROCEDURE — 1159F MED LIST DOCD IN RCRD: CPT | Mod: S$GLB,,, | Performed by: INTERNAL MEDICINE

## 2017-08-23 PROCEDURE — 99999 PR PBB SHADOW E&M-EST. PATIENT-LVL III: CPT | Mod: PBBFAC,,, | Performed by: INTERNAL MEDICINE

## 2017-08-23 NOTE — PROGRESS NOTES
A 77-year-old  woman who has been following with me in the past for   iron deficiency anemia requiring periodic iron infusions.  The patient went for   COPD exacerbation with Dr. Duncan's clinic end of 2016.  Around November, was   found to have a right lung mass.  It was biopsied.  The patient has squamous   cell CA.  She went to Dr. Bahena in the [place] to look for   resectability, was not deemed to be a candidate for surgery and ended up with   radiation therapy that ended around February 2017.  The patient had a scan three   months ago and has repeated her scans with Radiation-Oncology.  There is   progressive disease.  Recently, she has also received Injectafer.  Her other   medical conditions include COPD and depression.  The patient is here to discuss   the result of the most recent PET scan.  She has not seen a radiation-oncologist   yet.  She is here with three of her daughters.  The patient obviously is   anxious about the results reviewed, what the PET scan entails and about the   treatment plan should be.    PHYSICAL EXAM:  GENERAL:  This is a well-built, well-groomed, comfortable patient without any   deformities ambulating to clinic.  The patient is in no distress.    NEURO:  Awake, alert and oriented to time, person and place.  The patient   demonstrates no evidence of depression, anxiety or agitation.  Patient is   cooperative with exam and discussion.    EYES:  Normal conjunctivae and eyelids.  PERRLA 3 to 4 mm without icterus.    ENT AND MOUTH:  External appearance of ears and nose normal without scars,   lesions or masses.  Nasal mucosa, turbinates and septum are normal.  No ENT   drainage and dried blood noted.  No tenderness over frontal or maxillary sinus.    Lips and gums are normal.  Dentition is normal.    NECK:  Supple.  Trachea is central.  No crepitus.  No JVD.  No thyromegaly or   masses.     RESPIRATORY:  No intercostal retractions and no use of accessory muscles of    respirations noted.  No areas of dullness to percussion.  Chest is clear to   auscultation.  No rales, rhonchi or wheezes.  No crepitus.  Good air entry   bilaterally.    CARDIOVASCULAR:  No cardiomegaly.  Normal location.  No thrills or heaviness.    Normal carotid pulse.  No bruits.  S1 and S2 are normally heard without murmurs   or gallops.  All peripheral pulses, including pedal pulses, are present.    ABDOMEN:  Normal abdomen.  No hepatosplenomegaly.  No free fluid.  Bowel sounds   are present.  No hernia noted.  No masses.  No rebound or tenderness.  No   guarding or rigidity.   Umbilicus is midline.    LYMPHATICS:  No axillary, cervical, supraclavicular, submental, or inguinal   lymphadenopathy.    SKIN AND MUSCULOSKELETAL:  There is no evidence of excoriation marks or   ecchymosis.  No rashes.  No pigmented lesions, induration or subcutaneous   nodules.  No sores or abnormal moles.  No cyanosis.  No clubbing.  Nailbed   abnormalities are noted. No joint or skeletal deformities noted.  Normal range   of motion.    BREASTS:  No abnormal masses or discharge.    NEUROLOGIC:  Higher functions are appropriate.   No cranial nerve deficits.    Normal gait.  Normal strength.  Motor and sensory functions are normal.  Deep   tendon reflexes are normal without Babinski's sign or ankle clonus.    GENITAL AND RECTAL:  Exams are deferred.    LABORATORY DATA:  Reveal excellent response to iron.  Her hemoglobin has   improved from 11.9 to 14.4, white counts normal, platelets normal.  STFR is   improved from 10.3 to 6.7.  Ferritin is improved from 15 to 890, and total iron   is improved from 24 to 78.  The patient's comprehensive metabolic panel is   within normal range.  Her PET scan reveals avid-FDG activity in the right upper   lobe, previously known lung cancer.  There is a new nodule that is FDG-avid as   well as a right lower lobe nodule, which is also PET-avid indicating metastatic   lesion.  She has not seen   Robbin as of yet.    IMPRESSION:  1.  Progressive i.e., metastatic squamous cell lung cancer diagnosed November 2016, treated with radiation, now showing recurrence and new lesions.  Lengthy   discussion of surgical radiation options, which both I think are not solutions   for her.  She will need to decide combination chemotherapy.  I would get more   biopsies done on this patient to test out for ROS1, ALK, PD-L and EGFR to decide   treatment planning.  This will be on a more maintenance therapy style.  2.  Iron deficiency anemia, has corrected markedly.  We will recheck in about   three to four months.  3.  Depression.  Continue medications and followup.  4.  COPD.  Continue her nebulizers and follow with Dr. Mono Duncan.    The plan would be that I get these biopsies done as quickly as possible with   Interventional Radiology.  See the patient back in clinic with her discussion   and then proceed with planned therapy.  The patient voiced understanding of   plan.  I will see her in clinic after biopsy.      SSS/PN  dd: 08/23/2017 14:05:52 (CDT)  td: 08/23/2017 22:53:59 (CDT)  Doc ID   #[id number]  Job ID #[id number]    CC:

## 2017-08-23 NOTE — TELEPHONE ENCOUNTER
Received call from Renetta Ellis in radiology @ [facility] asking which lung nodule Dr. Ceballos wanted biopsied, the right upper or lower nodule.  Asked Dr. Ceballos and she said that she doesn't really care which right lung nodule gets biopsied as long as they get enough tissue to do all the statins she needs and also to make sure they know that the upper right lung nodule was radiated and it may be hard to get because of scar tissue.   Dr. Ceballos's orders explained to Renetta Ellis and she verbalizes understanding.

## 2017-08-24 DIAGNOSIS — R91.8 LUNG MASS: Primary | ICD-10-CM

## 2017-08-24 NOTE — NURSING
Rad RN spoke to patient, all preop instructions discussed with patient, arrival time 0730 am, procedure time 9 am, all pts questions answered to her satisfaction and pt verbalized understanding. AR  Pt denies any use of heparin, blood thinners, no port. AR

## 2017-08-25 ENCOUNTER — TELEPHONE (OUTPATIENT)
Dept: HEMATOLOGY/ONCOLOGY | Facility: CLINIC | Age: 77
End: 2017-08-25

## 2017-08-25 NOTE — TELEPHONE ENCOUNTER
Called pt and notified her that her lung bx is scheduled for 8/28/17.  Pt said she was already informed.  Dr. Ceballos appt scheduled to review lung bx results.  Pt verbalizes understanding.  Notified Marce in pathology at [facility] of pts lung bx date and orders for EGFR, PDL 1, ALK, ROS.  Marce verbalizes understanding.

## 2017-08-28 ENCOUNTER — HOSPITAL ENCOUNTER (OUTPATIENT)
Dept: RADIOLOGY | Facility: HOSPITAL | Age: 77
Discharge: HOME | End: 2017-08-28
Attending: RADIOLOGY
Payer: MEDICARE

## 2017-08-28 ENCOUNTER — HOSPITAL ENCOUNTER (OUTPATIENT)
Dept: RADIOLOGY | Facility: HOSPITAL | Age: 77
Discharge: HOME | End: 2017-08-28
Attending: INTERNAL MEDICINE
Payer: MEDICARE

## 2017-08-28 VITALS
TEMPERATURE: 98 F | BODY MASS INDEX: 27.75 KG/M2 | DIASTOLIC BLOOD PRESSURE: 60 MMHG | WEIGHT: 147 LBS | OXYGEN SATURATION: 93 % | HEART RATE: 80 BPM | HEIGHT: 61 IN | RESPIRATION RATE: 20 BRPM | SYSTOLIC BLOOD PRESSURE: 131 MMHG

## 2017-08-28 DIAGNOSIS — C34.90 MALIGNANT NEOPLASM OF LUNG, UNSPECIFIED LATERALITY, UNSPECIFIED PART OF LUNG: ICD-10-CM

## 2017-08-28 PROCEDURE — 88305 TISSUE EXAM BY PATHOLOGIST: CPT | Performed by: PATHOLOGY

## 2017-08-28 PROCEDURE — 99900104 DSU ONLY-NO CHARGE-EA ADD'L HR (STAT)

## 2017-08-28 PROCEDURE — 88341 IMHCHEM/IMCYTCHM EA ADD ANTB: CPT | Mod: 26,,, | Performed by: PATHOLOGY

## 2017-08-28 PROCEDURE — 77012 CT SCAN FOR NEEDLE BIOPSY: CPT | Mod: 26,,, | Performed by: RADIOLOGY

## 2017-08-28 PROCEDURE — 32405 CT BIOPSY LUNG (XPD): CPT | Mod: ,,, | Performed by: RADIOLOGY

## 2017-08-28 PROCEDURE — 71010 XR CHEST 1 VIEW: CPT | Mod: TC

## 2017-08-28 PROCEDURE — 99900103 DSU ONLY-NO CHARGE-INITIAL HR (STAT)

## 2017-08-28 PROCEDURE — 88342 IMHCHEM/IMCYTCHM 1ST ANTB: CPT | Mod: 26,,, | Performed by: PATHOLOGY

## 2017-08-28 PROCEDURE — 77012 CT SCAN FOR NEEDLE BIOPSY: CPT | Mod: TC

## 2017-08-28 PROCEDURE — 63600175 PHARM REV CODE 636 W HCPCS

## 2017-08-28 PROCEDURE — 71010 XR CHEST 1 VIEW: CPT | Mod: 26,,, | Performed by: RADIOLOGY

## 2017-08-28 PROCEDURE — 88313 SPECIAL STAINS GROUP 2: CPT | Mod: 26,,, | Performed by: PATHOLOGY

## 2017-08-28 PROCEDURE — 71010 XR CHEST 1 VIEW: CPT | Mod: 26,76,, | Performed by: RADIOLOGY

## 2017-08-28 PROCEDURE — 25000003 PHARM REV CODE 250: Performed by: RADIOLOGY

## 2017-08-28 RX ORDER — FENTANYL CITRATE 50 UG/ML
25 INJECTION, SOLUTION INTRAMUSCULAR; INTRAVENOUS ONCE
Status: COMPLETED | OUTPATIENT
Start: 2017-08-28 | End: 2017-08-28

## 2017-08-28 RX ORDER — MIDAZOLAM HYDROCHLORIDE 5 MG/ML
INJECTION INTRAMUSCULAR; INTRAVENOUS
Status: COMPLETED
Start: 2017-08-28 | End: 2017-08-28

## 2017-08-28 RX ORDER — LIDOCAINE HYDROCHLORIDE 10 MG/ML
1 INJECTION, SOLUTION EPIDURAL; INFILTRATION; INTRACAUDAL; PERINEURAL ONCE
Status: DISCONTINUED | OUTPATIENT
Start: 2017-08-28 | End: 2017-08-29 | Stop reason: HOSPADM

## 2017-08-28 RX ORDER — LIDOCAINE HYDROCHLORIDE 10 MG/ML
INJECTION, SOLUTION EPIDURAL; INFILTRATION; INTRACAUDAL; PERINEURAL
Status: DISPENSED
Start: 2017-08-28 | End: 2017-08-28

## 2017-08-28 RX ORDER — ACETAMINOPHEN 325 MG/1
650 TABLET ORAL ONCE
Status: COMPLETED | OUTPATIENT
Start: 2017-08-28 | End: 2017-08-28

## 2017-08-28 RX ORDER — FENTANYL CITRATE 50 UG/ML
INJECTION, SOLUTION INTRAMUSCULAR; INTRAVENOUS
Status: COMPLETED
Start: 2017-08-28 | End: 2017-08-28

## 2017-08-28 RX ORDER — MIDAZOLAM HYDROCHLORIDE 5 MG/ML
1 INJECTION INTRAMUSCULAR; INTRAVENOUS ONCE
Status: COMPLETED | OUTPATIENT
Start: 2017-08-28 | End: 2017-08-28

## 2017-08-28 RX ADMIN — FENTANYL CITRATE 25 MCG: 50 INJECTION INTRAMUSCULAR; INTRAVENOUS at 09:08

## 2017-08-28 RX ADMIN — MIDAZOLAM HYDROCHLORIDE 1 MG: 5 INJECTION INTRAMUSCULAR; INTRAVENOUS at 09:08

## 2017-08-28 RX ADMIN — ACETAMINOPHEN 650 MG: 325 TABLET ORAL at 01:08

## 2017-08-28 RX ADMIN — MIDAZOLAM 1 MG: 5 INJECTION INTRAMUSCULAR; INTRAVENOUS at 09:08

## 2017-08-28 RX ADMIN — FENTANYL CITRATE 25 MCG: 50 INJECTION, SOLUTION INTRAMUSCULAR; INTRAVENOUS at 09:08

## 2017-08-28 NOTE — OP NOTE
Radiology Post-Procedure Note    Pre Op Diagnosis: lung nodules    Post Op Diagnosis: Same    Procedure: CT guided RLL nodule biopsy    Procedure performed by: Nuno  Written Informed Consent Obtained: Yes  Specimen Removed: YES   Estimated Blood Loss: less than 100     Findings:   5, 20G core biopsies obtained from RLL nodule.  No pneumothorax.  Some hemoptysis noted with decreased after a few minutes, will monitor.       Patient tolerated procedure well.    @SIG@

## 2017-08-28 NOTE — H&P (VIEW-ONLY)
A 77-year-old  woman who has been following with me in the past for   iron deficiency anemia requiring periodic iron infusions.  The patient went for   COPD exacerbation with Dr. Duncan's clinic end of 2016.  Around November, was   found to have a right lung mass.  It was biopsied.  The patient has squamous   cell CA.  She went to Dr. Bahena in the [place] to look for   resectability, was not deemed to be a candidate for surgery and ended up with   radiation therapy that ended around February 2017.  The patient had a scan three   months ago and has repeated her scans with Radiation-Oncology.  There is   progressive disease.  Recently, she has also received Injectafer.  Her other   medical conditions include COPD and depression.  The patient is here to discuss   the result of the most recent PET scan.  She has not seen a radiation-oncologist   yet.  She is here with three of her daughters.  The patient obviously is   anxious about the results reviewed, what the PET scan entails and about the   treatment plan should be.    PHYSICAL EXAM:  GENERAL:  This is a well-built, well-groomed, comfortable patient without any   deformities ambulating to clinic.  The patient is in no distress.    NEURO:  Awake, alert and oriented to time, person and place.  The patient   demonstrates no evidence of depression, anxiety or agitation.  Patient is   cooperative with exam and discussion.    EYES:  Normal conjunctivae and eyelids.  PERRLA 3 to 4 mm without icterus.    ENT AND MOUTH:  External appearance of ears and nose normal without scars,   lesions or masses.  Nasal mucosa, turbinates and septum are normal.  No ENT   drainage and dried blood noted.  No tenderness over frontal or maxillary sinus.    Lips and gums are normal.  Dentition is normal.    NECK:  Supple.  Trachea is central.  No crepitus.  No JVD.  No thyromegaly or   masses.     RESPIRATORY:  No intercostal retractions and no use of accessory muscles of    respirations noted.  No areas of dullness to percussion.  Chest is clear to   auscultation.  No rales, rhonchi or wheezes.  No crepitus.  Good air entry   bilaterally.    CARDIOVASCULAR:  No cardiomegaly.  Normal location.  No thrills or heaviness.    Normal carotid pulse.  No bruits.  S1 and S2 are normally heard without murmurs   or gallops.  All peripheral pulses, including pedal pulses, are present.    ABDOMEN:  Normal abdomen.  No hepatosplenomegaly.  No free fluid.  Bowel sounds   are present.  No hernia noted.  No masses.  No rebound or tenderness.  No   guarding or rigidity.   Umbilicus is midline.    LYMPHATICS:  No axillary, cervical, supraclavicular, submental, or inguinal   lymphadenopathy.    SKIN AND MUSCULOSKELETAL:  There is no evidence of excoriation marks or   ecchymosis.  No rashes.  No pigmented lesions, induration or subcutaneous   nodules.  No sores or abnormal moles.  No cyanosis.  No clubbing.  Nailbed   abnormalities are noted. No joint or skeletal deformities noted.  Normal range   of motion.    BREASTS:  No abnormal masses or discharge.    NEUROLOGIC:  Higher functions are appropriate.   No cranial nerve deficits.    Normal gait.  Normal strength.  Motor and sensory functions are normal.  Deep   tendon reflexes are normal without Babinski's sign or ankle clonus.    GENITAL AND RECTAL:  Exams are deferred.    LABORATORY DATA:  Reveal excellent response to iron.  Her hemoglobin has   improved from 11.9 to 14.4, white counts normal, platelets normal.  STFR is   improved from 10.3 to 6.7.  Ferritin is improved from 15 to 890, and total iron   is improved from 24 to 78.  The patient's comprehensive metabolic panel is   within normal range.  Her PET scan reveals avid-FDG activity in the right upper   lobe, previously known lung cancer.  There is a new nodule that is FDG-avid as   well as a right lower lobe nodule, which is also PET-avid indicating metastatic   lesion.  She has not seen   [facility] as of yet.    IMPRESSION:  1.  Progressive i.e., metastatic squamous cell lung cancer diagnosed November 2016, treated with radiation, now showing recurrence and new lesions.  Lengthy   discussion of surgical radiation options, which both I think are not solutions   for her.  She will need to decide combination chemotherapy.  I would get more   biopsies done on this patient to test out for ROS1, ALK, PD-L and EGFR to decide   treatment planning.  This will be on a more maintenance therapy style.  2.  Iron deficiency anemia, has corrected markedly.  We will recheck in about   three to four months.  3.  Depression.  Continue medications and followup.  4.  COPD.  Continue her nebulizers and follow with Dr. Mono Duncan.    The plan would be that I get these biopsies done as quickly as possible with   Interventional Radiology.  See the patient back in clinic with her discussion   and then proceed with planned therapy.  The patient voiced understanding of   plan.  I will see her in clinic after biopsy.      SSS/PN  dd: 08/23/2017 14:05:52 (CDT)  td: 08/23/2017 22:53:59 (CDT)  Doc ID   #[id number]  Job ID #[id number]    CC:

## 2017-08-28 NOTE — PLAN OF CARE
Problem: Patient Care Overview  Goal: Individualization & Mutuality  Outcome: Outcome(s) achieved Date Met: 08/28/17  Pt. D/c per dr's orders, Iv removed catheter tip intact.  Pt. And daughter verbalized understanding of instructions and education.  Pt. Stated headache is better, no pain, no nausea, DDI to right upper back.  Pt. Stopped coughing up blood.  Spoke to Dr. Reina and he stated ok to d/c pt. Home.  Pt. Wheeled to the car via w/c and left with daughter.  Dena Egan

## 2017-08-28 NOTE — INTERVAL H&P NOTE
The patient has been examined and the H&P has been reviewed:    I concur with the findings and no changes have occurred since H&P was written.     Planned procedure:  CT guided RLL nodule biopsy    ASA; class 2  Mallampati: Class 2    Lungs: mild wheezing  Heart: RRR    No personal or family history of sedation complications.     Plan:  Moderate sedation with IV fentanyl and versed        There are no hospital problems to display for this patient.

## 2017-08-28 NOTE — DISCHARGE INSTRUCTIONS
CT-Guided Lung Biopsy  CT-guided lung biopsy is a procedure to collect small tissue samples from an abnormal area in your lung. During the procedure, an imaging method called CT (computed tomography) is used to show live pictures of your lung. Then a thin needle is used to remove the tissue samples. The samples are tested in a lab for cancer and other problems.     For the biopsy, a thin needle is used to remove samples of tissue from an abnormal area in the lung.   Getting ready for your procedure  Follow any instructions from your healthcare provider.  Tell your provider about any medicines you are taking. It is important for your provider to know if you are taking any blood-thinning medicines or have a bleeding disorder.  You may need to stop taking all or some medicine before the procedure. This includes:  · All prescription medicines  · Blood-thinning medicines (anticoagulants)  · Over-the-counter medicines such as aspirin or ibuprofen  · Street drugs  · Herbs, vitamins, and other supplements  Also tell your provider if you:  · Are pregnant or think you may be pregnant  · Are breastfeeding  · Are allergic to or have intolerances to any medicines  · Have a chronic cough, new cough, or other illness  · Use oxygen therapy at home  · Smoke or drink alcohol on a regular basis  Follow any directions youre given for not eating or drinking before the procedure.  The day of your procedure  The procedure takes about 60 minutes. The entire procedure (including time to prepare and recover) takes several hours. Youll likely go home the same day.  Before the procedure begins:  · An IV (intravenous) line may be put into a vein in your hand or arm. This line supplies fluids and medicines.  · To keep you free of pain during the procedure, you may be given anesthesia. Depending on the type of anesthesia used, you may be awake, drowsy, or in a deep sleep for the procedure.  During the procedure:  · Youll lie on a CT scan  table. You may be on your back, side, or stomach. Pictures of your lung are then taken using the CT scanner. This helps your provider find the best place to position the needle in your lungs.  · A roderick is made on your skin where the needle will be inserted (biopsy site). The site is injected with numbing medicine.  · Using the CT pictures as a guide, the needle is passed through the numbed skin between your ribs and into your lung. Samples of tissue are then removed from the abnormal area in your lung. The samples are sent to a lab to be checked for problems.  · When the procedure is complete, the needle is removed. Pressure is applied to the biopsy site to help stop any bleeding. The site is then bandaged.  After the procedure:  · Youll be taken to a room to rest until the anesthesia wears off.  · A chest X-ray may be done. This is to make sure there was no damage to your lungs or the area where the needle was placed.  · When its time for you to go home, have an adult family member or friend ready to drive you.  Recovering at home  You may cough up a small amount of blood shortly after the procedure. Later, you may also have some soreness around the biopsy site. Once at home, follow any instructions youre given. Be sure to:  · Take all medicines as directed.  · Care for the biopsy site as instructed.  · Check for signs of infection at the biopsy site (see below).  · Do not bathe or shower until your provider says it's OK. If you wish, you may wash with a sponge or washcloth.  · Avoid heavy lifting and other strenuous activities as directed.  · If you plan any air travel, ask your provider when you can do so. You may be told not to fly for a few weeks. This is because pressure changes may affect your lungs.  When to call your provider  Call 911 or your local emergency number if you have sudden, severe difficulty breathing. This could be a life-threatening emergency.  Call your healthcare provider for less severe  symptoms that are still concerning. If you are unable to speak with your provider, go to the emergency room if you have any of the following symptoms:  · Fever of 100.4°F (38°C) or higher, or as directed by your provider  · Sudden chest pain, shortness of breath, or fainting  · Coughing up increasing amounts of blood  · Signs of infection at the biopsy site, such as increased redness or swelling, warmth, more pain, bleeding, or bad-smelling drainage   Follow-up  The provider who ordered your test will discuss the biopsy results with you during a follow-up visit. Results are usually ready within 1 to 2 weeks. If more tests or treatments are needed, your provider will discuss these with you.  Risks and possible complications  All procedures have some risk. Possible risks of this procedure include:  · An air leak in your lung (pneumothorax). This may require a stay in the hospital and treatment to re-inflate the lung.  · Bleeding into or around the lung  · Infection in the skin or lung  · Injury to other structures in the chest  · Risks of anesthesia. This will be discussed with you before the procedure.   Date Last Reviewed: 6/11/2015  © 9692-2883 [employer]. [address] [place] [address]. All rights reserved. This information is not intended as a substitute for professional medical care. Always follow your healthcare professional's instructions.    Post op instructions for prevention of DVT  What is deep vein thrombosis?  Deep vein thrombosis (DVT) is the medical term for blood clots in the deep veins of the leg.  These blood clots can be dangerous.  A DVT can block a blood vessel and keep blood from getting where it needs to go.  Another problem is that the clot can travel to other parts of the body such as the lungs.  A clot that travels to the lungs is called a pulmonary embolus (PE) and can cause serious problems with breathing which can lead to death.  Am I at risk for DVT/PE?  If you  are not very active, you are at risk of DVT.  Anyone confined to bed, sitting for long periods of time, recovering from surgery, etc. increases the risk of DVT.  Other risk factors are cancer diagnosis, certain medications, older age, obesity, pregnancy, history of clotting disorders, and dehydration.  How will I know if I have a DVT?   Swelling in the lower leg   Pain   Warmth, redness, hardness or bulging of the vein  If you have any of these symptoms, call your doctors office right away.  Some people will not have any symptoms until the clot moves to the lungs.  What are the symptoms of a PE?   Panting, shortness of breath, or trouble breathing   Sharp, knife-like chest pain when you breathe   Coughing or coughing up blood   Rapid heartbeat  If you have any of these symptoms or get worse quickly, call 911 for emergency treatment.  How can I prevent a DVT?   Avoid long periods of inactivity and dont cross your legs--get up and walk around every hour or so.   Stay active--walking after surgery is highly encouraged.  This means you should get out of the house and walk in the neighborhood.  Going up and down stairs will not impair healing (unless advised against such activity by your doctor).     Drink plenty of noncaffeinated, nonalcoholic fluids each day to prevent dehydration.   Wear special support stockings, if they have been advised by your doctor.   If you travel, stop at least once an hour and walk around.   Avoid smoking (assistance with stopping is available through your healthcare provider)  Always notify your doctor if you are not able to follow the post operative instructions that are given to you at the time of discharge.  It may be necessary to prescribe one of the medications available to prevent DVT.      Procedural Sedation  Procedural sedation is medicine to ease discomfort, pain, and anxiety during a procedure. The medicine is often given through an intravenous (IV) line in your arm  or hand. In some cases, the medicine may be taken by mouth or inhaled. While you are under sedation, you will likely be awake. But you may not remember anything afterward.  Why procedural sedation is used  Sedation is used for many types of procedures. The goal is to reduce pain, anxiety, and stressful memories of a procedure. It can also help your health care provider treat you. For example, having a broken bone fixed may be easier if you feel relaxed.  Procedural sedation is used only for short, basic procedures. It is not used for complex surgeries. Some procedures that use this type of sedation include:  · Dental surgery  · Breast biopsy, to take a sample of breast tissue  · Endoscopy, to look at gastrointestinal problems  · Bronchoscopy, to check for lung problems  · Bone or joint realignment, to fix a broken bone or dislocated joint  · Minor foot or skin surgery  · Electrical cardioversion, to restore a normal heart rhythm  · Lumbar puncture, to assess neurological disease  Risks of procedural sedation  Procedural sedation has some risks and possible side effects, such as:  · Headache  · Nausea and vomiting  · Unpleasant memory of the procedure  · Lowered rate of breathing  · Changes in heart rate and blood pressure (rare)  · Inhalation of stomach contents into your lungs (rare)  Side effects will likely go away shortly after the procedure. Your health care team will watch your heart rate and breathing during your sedation. This is to help prevent problems.  Your own risks may vary based on your age and your overall health. They also depend on the type of sedation you are given. Talk with your health care provider about the risks that apply most to you.  Getting ready for procedural sedation  Talk with your health care provider how to get ready for your procedure. Tell him or her about all the medicines you take. This includes over-the-counter medicines such as ibuprofen. It also includes vitamins, herbs, and  other supplements. You may need to stop taking some medicines before the procedure, such as blood thinners and aspirin. If you smoke, you may need to stop. Talk with your health care provider if you need help to stop smoking.  Tell your health care provider if you:  · Have had any problems in the past with sedation or anesthesia  · Have had any recent changes in your health, such as an infection or fever  · Are pregnant or think you may be pregnant  Also, make sure to:  · Ask a family member or friend to take you home after the procedure. You cannot drive on the day you receive sedation.  · Not eat or drink after midnight the night before your procedure, if advised.  · Follow all other instructions from your health care provider.  During your procedural sedation  You may have your procedure in a hospital or a medical clinic. Sedation is done by a trained health care provider. In general, you can expect the following:  · You will be given medicine through an IV line in your arm or hand. Or you may receive a shot. The medicine may also be given by mouth. Or you may inhale it through a mask.  · If you receive medicine through an IV, you may feel the effects very quickly. You will start to feel relaxed and drowsy.  · During the procedure, your heart rate, breathing, and blood pressure will be closely watched. Your breathing and blood pressure may decrease a little. But you will likely not need help with your breathing. You may receive a little extra oxygen through a mask.  · You will probably be awake the entire time. If you do fall asleep, you should be easy to wake up, if needed. You should feel little or no pain.  · When your procedure is over, the sedative medicine will be stopped.  After your procedural sedation  You will begin to feel more awake and aware. But you will likely be drowsy for a while afterward. You will be closely watched as you become more alert. You may have a faint memory of the procedure. Or you  may not remember it at all.  You should be able to return home within an hour or two after your procedure. Plan to have someone stay with you for a few hours. Side effects such as headache and nausea may go away quickly. Tell your health care provider if they continue.  Dont drive or make any important decisions for at least 24 hours. Be sure to follow all after-care instructions.     When to call your health care provider  Have someone call your health care provider right away if you have any of these:  · Drowsiness that gets worse  · Weakness or dizziness that gets worse  · Repeated vomiting  · You cant be awakened   Date Last Reviewed: 2/6/2015  © 0781-5038 [employer]. [address] [place] [address], [place] [address]. All rights reserved. This information is not intended as a substitute for professional medical care. Always follow your healthcare professional's instructions.            We hope your stay was comfortable as you heal now, mend and rest.    For we have enjoyed taking care of you by giving your our best.    And as you get better, by regaining your health and strength;   We count it as a privilege to have served you and hope your time at [facility] was well spent.      Thank  You!!!

## 2017-08-28 NOTE — NURSING
Labs reviewed with Dr. Reina, patient s/p right lung nodule biopsy, pt received fentanyl 25 mcg IV, versed 1 mg IV, some bright red hemoptysis immediately post procedure which has decreased, per Dr. Reina pt is to lie on right side, post CT no pneumothorax per Dr. Reina, STAT CXR at 1004 and 1304, CXR at 1004 no pneumothorax per Dr. Reina, 5 specimen pieces submitted for testing per MD orders. Report called to PONCHO Traylor verbalized ofelia renee, report given to PONCHO Colon at bedside verbalized understanding. AR

## 2017-08-28 NOTE — PLAN OF CARE
Dr. Reina called, informed him that the hemoptysis has stopped.  He stated ok for pt. To d/c home, just to inform if pt. Has chest pain or starts to cough up a significant amt. Of blood to go to the ER or call 911.  Informed pt. And daughter.  Dena Egan

## 2017-08-28 NOTE — OR NURSING
Checked in on patient.  Talking and visiting with family at bedside.  Po fluids provided.  Denies hemoptysis with cough.  Respirations unlabored.

## 2017-08-28 NOTE — PLAN OF CARE
Problem: Patient Care Overview  Goal: Plan of Care Review  Pt. Is awaiting procedure, no complaints, will continue to monitor. Dena Egan

## 2017-09-10 DIAGNOSIS — C34.00 MALIGNANT NEOPLASM OF HILUS OF LUNG, UNSPECIFIED LATERALITY: ICD-10-CM

## 2017-09-11 ENCOUNTER — OFFICE VISIT (OUTPATIENT)
Dept: HEMATOLOGY/ONCOLOGY | Facility: CLINIC | Age: 77
End: 2017-09-11
Payer: MEDICARE

## 2017-09-11 VITALS
HEIGHT: 61 IN | TEMPERATURE: 99 F | SYSTOLIC BLOOD PRESSURE: 122 MMHG | RESPIRATION RATE: 18 BRPM | BODY MASS INDEX: 28.14 KG/M2 | DIASTOLIC BLOOD PRESSURE: 59 MMHG | HEART RATE: 104 BPM | WEIGHT: 149.06 LBS

## 2017-09-11 DIAGNOSIS — C34.00 MALIGNANT NEOPLASM OF HILUS OF LUNG, UNSPECIFIED LATERALITY: Primary | ICD-10-CM

## 2017-09-11 DIAGNOSIS — C34.11 MALIGNANT NEOPLASM OF UPPER LOBE OF RIGHT LUNG: ICD-10-CM

## 2017-09-11 DIAGNOSIS — T45.1X5A CHEMOTHERAPY INDUCED NEUTROPENIA: ICD-10-CM

## 2017-09-11 DIAGNOSIS — C34.00 MALIGNANT NEOPLASM OF HILUS OF LUNG, UNSPECIFIED LATERALITY: ICD-10-CM

## 2017-09-11 DIAGNOSIS — D50.0 IRON DEFICIENCY ANEMIA DUE TO CHRONIC BLOOD LOSS: Chronic | ICD-10-CM

## 2017-09-11 DIAGNOSIS — D70.1 CHEMOTHERAPY INDUCED NEUTROPENIA: ICD-10-CM

## 2017-09-11 PROCEDURE — 99999 PR PBB SHADOW E&M-EST. PATIENT-LVL III: CPT | Mod: PBBFAC,,, | Performed by: INTERNAL MEDICINE

## 2017-09-11 PROCEDURE — 3008F BODY MASS INDEX DOCD: CPT | Mod: S$GLB,,, | Performed by: INTERNAL MEDICINE

## 2017-09-11 PROCEDURE — 1159F MED LIST DOCD IN RCRD: CPT | Mod: S$GLB,,, | Performed by: INTERNAL MEDICINE

## 2017-09-11 PROCEDURE — 99215 OFFICE O/P EST HI 40 MIN: CPT | Mod: S$GLB,,, | Performed by: INTERNAL MEDICINE

## 2017-09-11 PROCEDURE — 1125F AMNT PAIN NOTED PAIN PRSNT: CPT | Mod: S$GLB,,, | Performed by: INTERNAL MEDICINE

## 2017-09-11 PROCEDURE — 99499 UNLISTED E&M SERVICE: CPT | Mod: S$PBB,,, | Performed by: INTERNAL MEDICINE

## 2017-09-11 RX ORDER — ONDANSETRON 8 MG/1
8 TABLET, ORALLY DISINTEGRATING ORAL EVERY 12 HOURS PRN
Qty: 30 TABLET | Refills: 1 | Status: SHIPPED | OUTPATIENT
Start: 2017-09-11 | End: 2017-10-23

## 2017-09-11 RX ORDER — PROCHLORPERAZINE MALEATE 10 MG
TABLET ORAL
Qty: 385 TABLET | Refills: 1 | Status: SHIPPED | OUTPATIENT
Start: 2017-09-11

## 2017-09-11 RX ORDER — PROCHLORPERAZINE MALEATE 10 MG
10 TABLET ORAL EVERY 6 HOURS PRN
Qty: 30 TABLET | Refills: 1 | Status: SHIPPED | OUTPATIENT
Start: 2017-09-11 | End: 2017-09-11 | Stop reason: SDUPTHER

## 2017-09-11 RX ORDER — LIDOCAINE AND PRILOCAINE 25; 25 MG/G; MG/G
CREAM TOPICAL
Qty: 5 G | Refills: 0 | Status: ON HOLD | OUTPATIENT
Start: 2017-09-11 | End: 2018-04-13 | Stop reason: HOSPADM

## 2017-09-11 NOTE — PROGRESS NOTES
A 77-year-old  woman who has been following with me in the past for   iron deficiency anemia also has dx of sq ca of lung that was rx with xrt alone 1/2017, was not a good sx candidate, she has been requiring iron infusion periodically for poor absorption   had repeat bx of these new PET findings from 8/2017, KRAS neg, ALK neg , EGFR neg, and ROS-1 neg, here with family today for rx discussions  PHYSICAL EXAM:  Wt Readings from Last 3 Encounters:   09/11/17 67.6 kg (149 lb 0.5 oz)   08/28/17 66.7 kg (147 lb)   08/23/17 67.8 kg (149 lb 7.6 oz)     Temp Readings from Last 3 Encounters:   09/11/17 98.8 °F (37.1 °C)   08/28/17 97.5 °F (36.4 °C)   08/23/17 98 °F (36.7 °C)     BP Readings from Last 3 Encounters:   09/11/17 (!) 122/59   08/28/17 131/60   08/23/17 (!) 119/58     Pulse Readings from Last 3 Encounters:   09/11/17 104   08/28/17 80   08/23/17 106     GENERAL:  This is a well-built, well-groomed, comfortable patient without any       deformities ambulating to clinic.  The patient is in no distress.    NEURO:  Awake, alert and oriented to time, person and place.  The patient   demonstrates no evidence of depression, anxiety or agitation.  Patient is   cooperative with exam and discussion.    EYES:  Normal conjunctivae and eyelids.  PERRLA 3 to 4 mm without icterus.    ENT AND MOUTH:  External appearance of ears and nose normal without scars,   lesions or masses.  Nasal mucosa, turbinates and septum are normal.  No ENT   drainage and dried blood noted.  No tenderness over frontal or maxillary sinus.    Lips and gums are normal.  Dentition is normal.    NECK:  Supple.  Trachea is central.  No crepitus.  No JVD.  No thyromegaly or   masses.     RESPIRATORY:  No intercostal retractions and no use of accessory muscles of   respirations noted.  No areas of dullness to percussion.  Chest is clear to   auscultation.  No rales, rhonchi or wheezes.  No crepitus.  Good air entry   bilaterally.    CARDIOVASCULAR:   No cardiomegaly.  Normal location.  No thrills or heaviness.    Normal carotid pulse.  No bruits.  S1 and S2 are normally heard without murmurs   or gallops.  All peripheral pulses, including pedal pulses, are present.    ABDOMEN:  Normal abdomen.  No hepatosplenomegaly.  No free fluid.  Bowel sounds   are present.  No hernia noted.  No masses.  No rebound or tenderness.  No   guarding or rigidity.   Umbilicus is midline.    LYMPHATICS:  No axillary, cervical, supraclavicular, submental, or inguinal   lymphadenopathy.    SKIN AND MUSCULOSKELETAL:  There is no evidence of excoriation marks or   ecchymosis.  No rashes.  No pigmented lesions, induration or subcutaneous   nodules.  No sores or abnormal moles.  No cyanosis.  No clubbing.  Nailbed   abnormalities are noted. No joint or skeletal deformities noted.  Normal range   of motion.    BREASTS:  No abnormal masses or discharge.    NEUROLOGIC:  Higher functions are appropriate.   No cranial nerve deficits.    Normal gait.  Normal strength.  Motor and sensory functions are normal.  Deep   tendon reflexes are normal without Babinski's sign or ankle clonus.    GENITAL AND RECTAL:  Exams are deferred.    LABORATORY DATA:   Lab Results   Component Value Date    WBC 9.50 08/16/2017    HGB 14.4 08/16/2017    HCT 44.5 08/16/2017    MCV 81 (L) 08/16/2017     08/16/2017     CMP  Sodium   Date Value Ref Range Status   08/16/2017 138 136 - 145 mmol/L Final     Potassium   Date Value Ref Range Status   08/16/2017 4.1 3.5 - 5.1 mmol/L Final     Chloride   Date Value Ref Range Status   08/16/2017 103 95 - 110 mmol/L Final     CO2   Date Value Ref Range Status   08/16/2017 25 23 - 29 mmol/L Final     Glucose   Date Value Ref Range Status   08/16/2017 108 70 - 110 mg/dL Final     BUN, Bld   Date Value Ref Range Status   08/16/2017 6 (L) 8 - 23 mg/dL Final     Creatinine   Date Value Ref Range Status   08/16/2017 0.7 0.5 - 1.4 mg/dL Final     Calcium   Date Value Ref Range  Status   08/16/2017 9.9 8.7 - 10.5 mg/dL Final     Total Protein   Date Value Ref Range Status   08/16/2017 7.8 6.0 - 8.4 g/dL Final     Albumin   Date Value Ref Range Status   08/16/2017 3.3 (L) 3.5 - 5.2 g/dL Final     Total Bilirubin   Date Value Ref Range Status   08/16/2017 0.4 0.1 - 1.0 mg/dL Final     Comment:     For infants and newborns, interpretation of results should be based  on gestational age, weight and in agreement with clinical  observations.  Premature Infant recommended reference ranges:  Up to 24 hours.............<8.0 mg/dL  Up to 48 hours............<12.0 mg/dL  3-5 days..................<15.0 mg/dL  6-29 days.................<15.0 mg/dL       Alkaline Phosphatase   Date Value Ref Range Status   08/16/2017 172 (H) 55 - 135 U/L Final     AST   Date Value Ref Range Status   08/16/2017 19 10 - 40 U/L Final     ALT   Date Value Ref Range Status   08/16/2017 19 10 - 44 U/L Final     Anion Gap   Date Value Ref Range Status   08/16/2017 10 8 - 16 mmol/L Final     eGFR if    Date Value Ref Range Status   08/16/2017 >60 >60 mL/min/1.73 m^2 Final     eGFR if non    Date Value Ref Range Status   08/16/2017 >60 >60 mL/min/1.73 m^2 Final     Comment:     Calculation used to obtain the estimated glomerular filtration  rate (eGFR) is the CKD-EPI equation. Since race is unknown   in our information system, the eGFR values for   -American and Non--American patients are given   for each creatinine result.         IMPRESSION:  1.  Progressive i.e., metastatic squamous cell lung cancer diagnosed November 2016, treated with radiation, now showing recurrence and new lesions.  EGFR,ALK, ROS-1, PDL2. All negative   chemotherapy discussed, she is going to visit with family till 9/2017 will get port placed, most recent PET 8.2017  Chemo class for carbo/taxol with neulasta supprt for chemotherapy induced neutropenia prevention   start after 9/2017. Will need 24 hour urine  for cr clearance. rtc to see me for start date. Mouthwash, zofran, compazine , emla sent to pharmacy   Iron deficiency anemia, has corrected markedly.  We will recheck in about   three to four months.  3.  Depression.  Continue medications and followup.  4.  COPD.  Continue her nebulizers and follow with Dr. Mono Duncan.

## 2017-09-12 RX ORDER — ALPRAZOLAM 0.25 MG/1
TABLET ORAL
Qty: 60 TABLET | Refills: 0 | Status: SHIPPED | OUTPATIENT
Start: 2017-09-12 | End: 2017-10-23 | Stop reason: SDUPTHER

## 2017-09-12 NOTE — TELEPHONE ENCOUNTER
Returned pts call with no answer.  Left her a message explaining that she can take the zofran by mouth every 12 hours as needed for nausea.  Asked pt to please return my call at [phone] if she still has questions.

## 2017-09-12 NOTE — TELEPHONE ENCOUNTER
----- Message from Kira Contreras sent at 9/12/2017 11:22 AM CDT -----  Contact: Patient  Patient advised that she received a prescription for ondansetron (ZOFRAN-ODT) 8 MG TbDL from Dr. Ceballos yesterday, 9/11/17.  Patient would like to know when to take that medication. Please call patient back at [phone].  Thank you!

## 2017-09-13 ENCOUNTER — TELEPHONE (OUTPATIENT)
Dept: SURGERY | Facility: CLINIC | Age: 77
End: 2017-09-13

## 2017-09-13 DIAGNOSIS — C34.11 MALIGNANT NEOPLASM OF UPPER LOBE OF RIGHT LUNG: Primary | ICD-10-CM

## 2017-09-13 NOTE — TELEPHONE ENCOUNTER
----- Message from Letha Sunshine RN sent at 9/13/2017 12:13 PM CDT -----  Can you please schedule for port placement. Thanks

## 2017-09-13 NOTE — TELEPHONE ENCOUNTER
Message left requesting patient to call back about scheduling an appointment with Dr Greenberg concerning port placement.

## 2017-09-14 ENCOUNTER — TELEPHONE (OUTPATIENT)
Dept: HEMATOLOGY/ONCOLOGY | Facility: CLINIC | Age: 77
End: 2017-09-14

## 2017-09-15 ENCOUNTER — TELEPHONE (OUTPATIENT)
Dept: HEMATOLOGY/ONCOLOGY | Facility: CLINIC | Age: 77
End: 2017-09-15

## 2017-09-15 NOTE — TELEPHONE ENCOUNTER
message left instructing patient to call [phone] to see about getting port placed with Dr. Greenberg for chemotherapy.

## 2017-09-18 ENCOUNTER — TELEPHONE (OUTPATIENT)
Dept: SURGERY | Facility: CLINIC | Age: 77
End: 2017-09-18

## 2017-09-18 NOTE — TELEPHONE ENCOUNTER
----- Message from Dulce Maria Perry sent at 9/18/2017  8:42 AM CDT -----  Patient states she needs to schedule a date to get a port put in, please call [phone] (naaz)

## 2017-09-19 ENCOUNTER — HOSPITAL ENCOUNTER (OUTPATIENT)
Dept: PREADMISSION TESTING | Facility: HOSPITAL | Age: 77
Discharge: HOME | End: 2017-09-19
Attending: SURGERY
Payer: MEDICARE

## 2017-09-19 ENCOUNTER — OFFICE VISIT (OUTPATIENT)
Dept: SURGERY | Facility: CLINIC | Age: 77
End: 2017-09-19
Payer: MEDICARE

## 2017-09-19 VITALS — HEIGHT: 61 IN | BODY MASS INDEX: 28.03 KG/M2

## 2017-09-19 VITALS
WEIGHT: 148.38 LBS | BODY MASS INDEX: 28.03 KG/M2 | SYSTOLIC BLOOD PRESSURE: 133 MMHG | HEART RATE: 93 BPM | DIASTOLIC BLOOD PRESSURE: 70 MMHG

## 2017-09-19 DIAGNOSIS — I87.2 PERIPHERAL VENOUS INSUFFICIENCY: Primary | ICD-10-CM

## 2017-09-19 PROCEDURE — 99499 UNLISTED E&M SERVICE: CPT | Mod: S$PBB,,, | Performed by: SURGERY

## 2017-09-19 PROCEDURE — 99204 OFFICE O/P NEW MOD 45 MIN: CPT | Mod: S$GLB,,, | Performed by: SURGERY

## 2017-09-19 PROCEDURE — 1159F MED LIST DOCD IN RCRD: CPT | Mod: S$GLB,,, | Performed by: SURGERY

## 2017-09-19 PROCEDURE — 99900103 DSU ONLY-NO CHARGE-INITIAL HR (STAT)

## 2017-09-19 PROCEDURE — 99999 PR PBB SHADOW E&M-EST. PATIENT-LVL II: CPT | Mod: PBBFAC,,, | Performed by: SURGERY

## 2017-09-19 PROCEDURE — 3008F BODY MASS INDEX DOCD: CPT | Mod: S$GLB,,, | Performed by: SURGERY

## 2017-09-19 PROCEDURE — 1126F AMNT PAIN NOTED NONE PRSNT: CPT | Mod: S$GLB,,, | Performed by: SURGERY

## 2017-09-19 NOTE — PROGRESS NOTES
Subjective:       Patient ID: Delicia Murray is a 77 y.o. female.    Chief Complaint: Consult (port placement)    Referred by Dr. Ceballos for port placement.    Patient with newly diagnosed carcinoma of the right lung.  In need of venous access for chemotherapy.    A 77-year-old  woman who has been following with Dr. Ceballos in the past for   iron deficiency anemia also has dx of sq ca of lung that was rx with xrt alone 1/2017, was not a good sx candidate, she has been requiring iron infusion periodically for poor absorption   had repeat bx of these new PET findings from 8/2017, KRAS neg, ALK neg , EGFR neg, and ROS-1 neg    No blood thinners.       Review of Systems   Constitutional: Negative for appetite change, chills, diaphoresis, fatigue, fever and unexpected weight change.   HENT: Negative for hearing loss, sore throat, trouble swallowing and voice change.    Eyes: Negative for visual disturbance.   Respiratory: Negative for cough, shortness of breath and wheezing.    Cardiovascular: Negative for chest pain, palpitations and leg swelling.   Gastrointestinal: Negative for abdominal distention, abdominal pain, anal bleeding, blood in stool, constipation, diarrhea, nausea, rectal pain and vomiting.   Genitourinary: Negative for difficulty urinating, dysuria, flank pain, frequency, hematuria, menstrual problem and urgency.   Musculoskeletal: Negative for arthralgias, back pain, joint swelling, myalgias and neck pain.   Skin: Negative for pallor and rash.   Neurological: Negative for dizziness, syncope, weakness and headaches.   Hematological: Negative for adenopathy. Does not bruise/bleed easily.   Psychiatric/Behavioral: Negative for suicidal ideas. The patient is nervous/anxious.        Objective:      Physical Exam   Constitutional: She is oriented to person, place, and time. She appears well-developed and well-nourished. No distress.   HENT:   Head: Normocephalic and atraumatic.   Right Ear: External ear  normal.   Left Ear: External ear normal.   Eyes: Conjunctivae are normal. Pupils are equal, round, and reactive to light. Right eye exhibits no discharge. Left eye exhibits no discharge.   Neck: No tracheal deviation present. No thyromegaly present.   Cardiovascular: Normal rate and regular rhythm.    Pulmonary/Chest: Effort normal. No respiratory distress.   Abdominal: Soft. She exhibits no distension. There is no guarding.   Musculoskeletal: She exhibits no edema or tenderness.   Lymphadenopathy:     She has no cervical adenopathy.   Neurological: She is alert and oriented to person, place, and time. No cranial nerve deficit.   Skin: Skin is warm and dry. No rash noted. She is not diaphoretic. No pallor.   Psychiatric: She has a normal mood and affect. Her behavior is normal. Judgment and thought content normal.   Nursing note and vitals reviewed.      Assessment:       1. Peripheral venous insufficiency        Plan:       For port placement on 9/27/17.  All aspects of procedure including risks and possible complications were discussed in detail with the patient who agrees to proceed with procedure.  All questions were answered and consent was obtained. Preop orders were written.

## 2017-09-19 NOTE — LETTER
September 19, 2017      Brianna Ceballos MD  [address] [facility] [address] [place] [address]           [facility]  [address]  [facility] [address]  Phone: [phone]          Patient: Delicia Murray   MR Number: [id number]   YOB: 1940   Date of Visit: 9/19/2017       Dear Dr. Brianna Ceballos:    Thank you for referring Delicia Murray to me for evaluation. Attached you will find relevant portions of my assessment and plan of care.    If you have questions, please do not hesitate to call me. I look forward to following Delicia Murray along with you.    Sincerely,    Ephraim Greenberg MD    Enclosure  CC:  No Recipients    If you would like to receive this communication electronically, please contact [email or web address] or [phone] to request more information on [employer] Link access.    For providers and/or their staff who would like to refer a patient to [facility], please contact us through our one-stop-shop provider referral line, [facility], at [phone].    If you feel you have received this communication in error or would no longer like to receive these types of communications, please e-mail [email or web address]

## 2017-09-20 ENCOUNTER — TELEPHONE (OUTPATIENT)
Dept: HEMATOLOGY/ONCOLOGY | Facility: CLINIC | Age: 77
End: 2017-09-20

## 2017-09-20 RX ORDER — SODIUM CHLORIDE 9 MG/ML
INJECTION, SOLUTION INTRAVENOUS CONTINUOUS
Status: CANCELLED | OUTPATIENT
Start: 2017-09-27

## 2017-09-20 NOTE — TELEPHONE ENCOUNTER
----- Message from Zelda Linda sent at 9/20/2017  9:46 AM CDT -----  Patient is requesting a return call at [phone]  To advise when her chemo therapy will start.    Thank  you

## 2017-09-21 ENCOUNTER — TELEPHONE (OUTPATIENT)
Dept: HEMATOLOGY/ONCOLOGY | Facility: CLINIC | Age: 77
End: 2017-09-21

## 2017-09-21 NOTE — TELEPHONE ENCOUNTER
Patient notified that chemo will be scheduled after her 24 hour urine, chemo class ,and port is placed. Patient verbalized understanding and will  24 hour urine container.

## 2017-09-21 NOTE — TELEPHONE ENCOUNTER
Message left instructing patient to call [phone] ext [phone] to give appointment times for chemo.

## 2017-09-26 ENCOUNTER — TELEPHONE (OUTPATIENT)
Dept: HEMATOLOGY/ONCOLOGY | Facility: CLINIC | Age: 77
End: 2017-09-26

## 2017-09-26 ENCOUNTER — ANESTHESIA EVENT (OUTPATIENT)
Dept: SURGERY | Facility: HOSPITAL | Age: 77
End: 2017-09-26
Payer: MEDICARE

## 2017-09-26 ENCOUNTER — LAB VISIT (OUTPATIENT)
Dept: LAB | Facility: HOSPITAL | Age: 77
End: 2017-09-26
Attending: INTERNAL MEDICINE
Payer: MEDICARE

## 2017-09-26 ENCOUNTER — CLINICAL SUPPORT (OUTPATIENT)
Dept: HEMATOLOGY/ONCOLOGY | Facility: CLINIC | Age: 77
End: 2017-09-26
Payer: MEDICARE

## 2017-09-26 DIAGNOSIS — C34.00 MALIGNANT NEOPLASM OF HILUS OF LUNG, UNSPECIFIED LATERALITY: ICD-10-CM

## 2017-09-26 PROCEDURE — 82575 CREATININE CLEARANCE TEST: CPT

## 2017-09-26 NOTE — PROGRESS NOTES
Delicia YEE Murray  [id number]    [facility]    TITLE: PLAN OF CARE FOR THE CHEMOTHERAPY PATIENT / TEACHING PROTOCOL    PURPOSE: To involve the patient / significant other in the plan of care and to provide teaching to the significant other & patient receiving chemotherapy.    LEVEL: Independent.    CONTENT: The Plan of Care for the chemotherapy patient is individualized and appropriate to the patients needs, strengths, limitations, & goals.  Education includes information regarding chemotherapy side effects, the treatment itself, and self-care  Activities.    GOAL / OUTCOME STANDARDS    PHYSIOLOGIC: The client will remain free or experience minimal side effects or toxicities throughout the chemotherapy treatment period.     PSYCHOLOGIC: The client/significant others will demonstrate positive coping mechanisms in relation to chemotherapy and its side effects.      COGINITIVE: The client/significant others will verbalize understanding of self-care measure to avoid/minimize side effects of the chemotherapy regime.    EVALUATION / COMMENT KEY:    V = Audiovisual/Video  S = Successfully meets outcome  N = Needs further instruction  NA = Not applicable to the patient  P = Previous knowledge  U = Unable to comprehend  * = See progress notes          PLAN OF CARE  INFORMATION TO BE DELIVERED / NURSING INTERVENTIONS DATE EVALUATION   1. Assessment of client/caregiver,         knowledge of cancer diagnosis,         and chemotherapy as a treatment. 1a. Evaluate patient/caregiver learning ability    b. Plan teaching sessions with patient/caregiver according to needs and present anxiety level/ability to learn.    c. Provide Chemotherapy Education Packet,        Mouth Care Protocol,         Specific Patient Education Sheets. 09/26/2017 S   2. Individual chemotherapy treatment         plan. 2a. Review of Chemotherapy Education handout from [employer]            09/26/2017   S   3. Knowledge Deficit &  Self-Management of general side effects common to all chemotherapy:  a. Nausea/Vomiting  b.   Diarrhea  c. Mouth Care  d. Dental care  e. Constipation  f. Hair Loss  g. Potential for infection  h. Fatigue   3a. Reinforce that the majority of side effects from chemotherapy are reversible and are  controlled both in the hospital and at home        (blood counts recover, hair grows back).   b.  Refer to the following for reinforcement of         information post-treatment:  1. Mouth Care Protocol.  2. Bowel Protocol for constipation or diarrhea.  3.  Drug Specific Chemotherapy Information Sheets for each medication patient receiving.    09/26/2017     S     PLAN OF CARE  INFORMATION TO BE DELIVERED / NURSING INTERVENTIONS DATE EVALUATION   h. Potential for bleeding         i. Potential anemia/fatigue         j. Potential sunburn         k. Birth control measures  l. Safety measures post treatment 4.  Chemotherapy Home Care Instruction  and Safety Information Sheet.  A. patient/caregivers to thoroughly cook shellfish (shrimp, crab, etc) to decrease the chance of infection.    B.  Use sunscreen and protective clothing while in the sun.   09/26/2017      4. Knowledge deficit & Self Management of Drug Specific  Side Effects.    a. BLADDER EFFECTS        (Hemorrhagic Cystitis)                  Preventable with adequate hydration; occurs 2-3 days or more post treatment.   1.  Instruct patient to:  a.   Void at least every 2 hours; increase intake.  b.   DO NOT hold urine; go when urge is felt.  c.    Empty bladder at bedtime and on         awakening.  d.   Observe for color changes (red to tea           colored), amount and frequency changes.  e.   Notify oncologist of any abnormalities           in urine or voiding or if you cannot               drink adequate fluids.   09/26/2017   S   b.   CHANGES IN URINE        COLOR:      1.   Instruct patient:  a.   Most evident in first 2-3 voidings after            administration.  b. Lasts less than 24 hours.  c. If urine is discolored 2 or more days post- treatment, notify oncologist.      09/26/2017 S   c.    KIDNEY EFFECTS           (Nephrotoxicity)   1.  Instruct patient to:  a.   Drink 8-16 glasses of fluid/day the day   pre-treatment and 3-4 days post-treatment to maintain hydration; the best way to minimize kidney problems.  b.   Notify oncologist immediately if unable to drink fluids or if changes are noted in urinary elimination.     09/26/2017   S   a. PULMONARY TOXICITY    1. Instruct patient to report symptoms such as shortness of breath, chest pain, shallow breathing, or chest wall discomfort to physician.  2. Reinforce preventative measures used by the health care team.  a. Baseline and periodic PFT and chest x-ray.   09/26/2017   S     PLAN OF CARE INFORMATION TO BE DELIVERED / NURSING INTERVENTIONS DATE EVALUATION   b. NERVE & MUSCLE EFFECTS (neurotoxocity; neuropathy, possible visual/hearing changes)        3. Instruct patient to:    a. Report numbness or tingling of the hands/feet, loss of fine motor movement (buttoning shirt, tying shoelaces), or gait changes to your oncologist.  b. If numbness/tingling are present:  1. protect feet with shoes at all times.  2. Use gloves for washing dishes/gardening & potholders in kitchen.       09/26/2017   S   c. CARDIOTOXICITY  Decreased effectiveness of             cardiac function. Effective are                  cumulative and irreversible.                                    CARDIAC ARRYTHMIAS              4   Instruct:  a. Heart function may be tested before treatment and perdiocally during treatment.  b. Notify oncologist of irregular pulse, palpitations, shortness of breath, or swelling in lower extremities/feet.          Taxol and Taxotere can cause arrhythmias on infusion that resolve once infusion discontinued. Instruct nurse if any irregularity felt.    09/26/2017   S   d. EXTRAVASTION  Occurs when vesicants  leak outside of vein and cause damage to the skin and underlying tissues.   1. Reinforce preventive measures used to avoid complications.  a. Fresh IV site or central line monitored continuously with vesicant IVP.  b. Continuous infusion via central line site and blood return monitored periodically around the clock.  2. Instruct to:  a. Notify nurse of any discomfort, burning, stinging, etc. at IV site during chemotherapy administration.  b. Notify oncologist of any redness, pain, or swelling at IV site after discharge from hospital.   09/26/2017   S   e. HYPERSENSITIVITY can happen with any medication.   1. Instruct patient:  a. Nurse is with them during the initial part of treatment and will be close by to monitor.  b. Pre-medication ordered by the oncologist must be taken on time. If doses are missed, treatment will need to be re-scheduled.  c. Skin redness, itching, or hives appearing after discharge should be reported to oncologist. 09/26/2017   S       PLAN OF CARE INFORMATION TO BE DELIVERED / NURSING INTERVENTIONS DATE EVALUATION   f. FLU-LIKE SYNDROME      1. Instruct patient symptoms are hard to prevent and may include fever, shaking chills, muscle and body aches.  a. Taking prescribed medications from physician if needed.  b. Adequate fluids are important.    2. Reinforce the need to call if temperature is         elevated to 100.4 or more  09/26/2017   S   g. HAND-FOOT SYNDROME  causes painful, symmetric swelling and redness of palms and soles                  5. Instruct patient to report any numbness or tingling in the hands or feet.  6. Explain prevention techniques, such as     a. Use heavy moisturizers to lessen skin dryness and itching, but to avoid if skin is cracked or broken  b. Bathe in tepid water, use non-perfumed soap, and wash gently. Baths with oatmeal or diluted baking soda may be soothing.  c. Avoid tight fitting shoes and repetitive actions, such as rubbing hands or applying pressure to  hands/feet.  7. Review measures to take should syndrome occur:  a. Cold compresses and elevation for          edema  b. Pain medications and other measures as ordered by oncologist.   4.   Syndrome resolves few weeks after therapy. 09/26/2017   S   5. DISCHARGE PLANNING /        EDUCATION 1.    Explain importance of compliance with follow- up  tests (CBC, CMP).  2.    Verify patient/caregiver know:  a.    Oncologists office phone number.  b.    Dates of follow-up appointments.  c.    Prescriptions given for nausea  3.   Review side effects to monitor and notify          oncologist about.  4.   Reinforce the need for patient and caregivers to:  a.    Review information given.  b.    Call oncologists office with questions          or symptoms  5.   Provide [facility] [place] Brochure make referrals if needed for financial or .   09/26/2017   S     PROGRESS NOTES: I met with the patient and daughter today for chemotherapy education. she will be starting treatment with Taxol/Carboplatin with Neulasta on Tuesday, October 3, 2017 . We discussed the mechanism of action, potential side effects of this treatment as well as ways she can manage them at home. Some of these side effects include but or not limited to fever, nausea, vomiting, decreased appetite, fatigue, weakness, cytopenias, myalgia/arthralgia, constipation, diarrhea, bleeding, headache, shortness of breath, nail changes, taste change, hair thinning/loss, mood disturbances, or edema. We also discussed dietary modifications she should make although this will be discussed in more detail with the dietician. she was provided with anti-emetic medication from Dr. Barnett's office, a copy of all of the information we discussed today as well as Dr. Ceballos's contact information. she was provided a schedule for her first three chemotherapy's. She will obtain labs follow-up with Dr. Ceballos prior to each treatment.  She will be monitored for thoxicites  throughout treatment. All questions were answered and an informed consent was obtained. she was reminded to certainly contact us sooner if needed.

## 2017-09-26 NOTE — PROGRESS NOTES
NUTRITION NOTE  Delicia is a 77 year old female with metastatic lung cancer getting Carbo/taxol.  She has had sterotactic radiation in Jan. 2017.  Weight: 149 lbs.  She denies any recent weight loss.  States she has a fair appetite, but makes herself eat. Denies any swallowing problems.  Plan: Discussed importance of nutrition during cancer treatment. Discouraged intentional weight loss. 2. Advised she aim for 70 ounces of fluid daily. 3. Educated on the Fiber One protocol/Senokot-S protocol for constipation. 4. Discouraged use of herb and vitamin supplements during cancer treatment and discussed safe food handling. 5. Samples of nutrition supplements given.

## 2017-09-26 NOTE — PROGRESS NOTES
Met with patient to complete New Patient Orientation and distress screening.  Patient signed consent form to receive information from the [employer].

## 2017-09-27 ENCOUNTER — SURGERY (OUTPATIENT)
Age: 77
End: 2017-09-27

## 2017-09-27 ENCOUNTER — ANESTHESIA (OUTPATIENT)
Dept: SURGERY | Facility: HOSPITAL | Age: 77
End: 2017-09-27
Payer: MEDICARE

## 2017-09-27 ENCOUNTER — HOSPITAL ENCOUNTER (OUTPATIENT)
Facility: HOSPITAL | Age: 77
Discharge: HOME | End: 2017-09-27
Attending: SURGERY | Admitting: SURGERY
Payer: MEDICARE

## 2017-09-27 DIAGNOSIS — I87.2 PERIPHERAL VENOUS INSUFFICIENCY: Primary | ICD-10-CM

## 2017-09-27 LAB
CREAT CL/1.73 SQ M 12H UR+SERPL-ARVRAT: 65 ML/MIN
CREAT SERPL-MCNC: 0.7 MG/DL
CREAT UR-MCNC: 34.7 MG/DL
CREATININE, URINE (MG/SPEC): 650.6 MG/SPEC
URINE COLLECTION DURATION: 24 HR
URINE VOLUME: 1875 ML

## 2017-09-27 PROCEDURE — 25000003 PHARM REV CODE 250: Performed by: ANESTHESIOLOGY

## 2017-09-27 PROCEDURE — 37000009 HC ANESTHESIA EA ADD 15 MINS: Performed by: SURGERY

## 2017-09-27 PROCEDURE — 63600175 PHARM REV CODE 636 W HCPCS: Performed by: NURSE ANESTHETIST, CERTIFIED REGISTERED

## 2017-09-27 PROCEDURE — 63600175 PHARM REV CODE 636 W HCPCS: Performed by: SURGERY

## 2017-09-27 PROCEDURE — D9220A PRA ANESTHESIA: Mod: CRNA,,, | Performed by: NURSE ANESTHETIST, CERTIFIED REGISTERED

## 2017-09-27 PROCEDURE — 36561 INSERT TUNNELED CV CATH: CPT | Mod: RT,,, | Performed by: SURGERY

## 2017-09-27 PROCEDURE — 36000707: Performed by: SURGERY

## 2017-09-27 PROCEDURE — D9220A PRA ANESTHESIA: Mod: ANES,,, | Performed by: ANESTHESIOLOGY

## 2017-09-27 PROCEDURE — 99900104 DSU ONLY-NO CHARGE-EA ADD'L HR (STAT): Performed by: SURGERY

## 2017-09-27 PROCEDURE — 99900103 DSU ONLY-NO CHARGE-INITIAL HR (STAT): Performed by: SURGERY

## 2017-09-27 PROCEDURE — C1788 PORT, INDWELLING, IMP: HCPCS | Performed by: SURGERY

## 2017-09-27 PROCEDURE — 77001 FLUOROGUIDE FOR VEIN DEVICE: CPT | Mod: 26,,, | Performed by: SURGERY

## 2017-09-27 PROCEDURE — 25000003 PHARM REV CODE 250: Performed by: SURGERY

## 2017-09-27 PROCEDURE — 37000008 HC ANESTHESIA 1ST 15 MINUTES: Performed by: SURGERY

## 2017-09-27 PROCEDURE — 71000033 HC RECOVERY, INTIAL HOUR: Performed by: SURGERY

## 2017-09-27 PROCEDURE — 36000706: Performed by: SURGERY

## 2017-09-27 PROCEDURE — 71000015 HC POSTOP RECOV 1ST HR: Performed by: SURGERY

## 2017-09-27 DEVICE — KIT POWERPORT SINGLE 8FR: Type: IMPLANTABLE DEVICE | Site: CHEST | Status: FUNCTIONAL

## 2017-09-27 RX ORDER — HYDROMORPHONE HYDROCHLORIDE 2 MG/ML
1 INJECTION, SOLUTION INTRAMUSCULAR; INTRAVENOUS; SUBCUTANEOUS EVERY 4 HOURS PRN
Status: DISCONTINUED | OUTPATIENT
Start: 2017-09-27 | End: 2017-09-27 | Stop reason: HOSPADM

## 2017-09-27 RX ORDER — SODIUM CHLORIDE, SODIUM LACTATE, POTASSIUM CHLORIDE, CALCIUM CHLORIDE 600; 310; 30; 20 MG/100ML; MG/100ML; MG/100ML; MG/100ML
1000 INJECTION, SOLUTION INTRAVENOUS ONCE
Status: DISCONTINUED | OUTPATIENT
Start: 2017-09-27 | End: 2017-09-27 | Stop reason: HOSPADM

## 2017-09-27 RX ORDER — FENTANYL CITRATE 50 UG/ML
25 INJECTION, SOLUTION INTRAMUSCULAR; INTRAVENOUS EVERY 5 MIN PRN
Status: DISCONTINUED | OUTPATIENT
Start: 2017-09-27 | End: 2017-09-27 | Stop reason: HOSPADM

## 2017-09-27 RX ORDER — OXYCODONE HYDROCHLORIDE 5 MG/1
5 TABLET ORAL ONCE AS NEEDED
Status: COMPLETED | OUTPATIENT
Start: 2017-09-27 | End: 2017-09-27

## 2017-09-27 RX ORDER — HEPARIN 100 UNIT/ML
SYRINGE INTRAVENOUS
Status: DISCONTINUED | OUTPATIENT
Start: 2017-09-27 | End: 2017-09-27 | Stop reason: HOSPADM

## 2017-09-27 RX ORDER — PROPOFOL 10 MG/ML
VIAL (ML) INTRAVENOUS
Status: DISCONTINUED | OUTPATIENT
Start: 2017-09-27 | End: 2017-09-27

## 2017-09-27 RX ORDER — SODIUM CHLORIDE 9 MG/ML
INJECTION, SOLUTION INTRAVENOUS CONTINUOUS
Status: DISCONTINUED | OUTPATIENT
Start: 2017-09-27 | End: 2017-09-27 | Stop reason: HOSPADM

## 2017-09-27 RX ORDER — BUPIVACAINE HYDROCHLORIDE AND EPINEPHRINE 5; 5 MG/ML; UG/ML
INJECTION, SOLUTION EPIDURAL; INTRACAUDAL; PERINEURAL
Status: DISCONTINUED | OUTPATIENT
Start: 2017-09-27 | End: 2017-09-27 | Stop reason: HOSPADM

## 2017-09-27 RX ORDER — CEFAZOLIN SODIUM 2 G/50ML
2 SOLUTION INTRAVENOUS
Status: COMPLETED | OUTPATIENT
Start: 2017-09-27 | End: 2017-09-27

## 2017-09-27 RX ORDER — LIDOCAINE HYDROCHLORIDE 10 MG/ML
1 INJECTION, SOLUTION EPIDURAL; INFILTRATION; INTRACAUDAL; PERINEURAL ONCE
Status: COMPLETED | OUTPATIENT
Start: 2017-09-27 | End: 2017-09-27

## 2017-09-27 RX ORDER — ONDANSETRON 2 MG/ML
4 INJECTION INTRAMUSCULAR; INTRAVENOUS ONCE AS NEEDED
Status: DISCONTINUED | OUTPATIENT
Start: 2017-09-27 | End: 2017-09-27 | Stop reason: HOSPADM

## 2017-09-27 RX ORDER — LIDOCAINE HCL/PF 100 MG/5ML
SYRINGE (ML) INTRAVENOUS
Status: DISCONTINUED | OUTPATIENT
Start: 2017-09-27 | End: 2017-09-27

## 2017-09-27 RX ORDER — SODIUM CHLORIDE, SODIUM LACTATE, POTASSIUM CHLORIDE, CALCIUM CHLORIDE 600; 310; 30; 20 MG/100ML; MG/100ML; MG/100ML; MG/100ML
INJECTION, SOLUTION INTRAVENOUS CONTINUOUS
Status: DISCONTINUED | OUTPATIENT
Start: 2017-09-27 | End: 2017-09-27 | Stop reason: HOSPADM

## 2017-09-27 RX ORDER — DEXAMETHASONE SODIUM PHOSPHATE 4 MG/ML
INJECTION, SOLUTION INTRA-ARTICULAR; INTRALESIONAL; INTRAMUSCULAR; INTRAVENOUS; SOFT TISSUE
Status: DISCONTINUED | OUTPATIENT
Start: 2017-09-27 | End: 2017-09-27

## 2017-09-27 RX ORDER — FENTANYL CITRATE 50 UG/ML
INJECTION, SOLUTION INTRAMUSCULAR; INTRAVENOUS
Status: DISCONTINUED | OUTPATIENT
Start: 2017-09-27 | End: 2017-09-27

## 2017-09-27 RX ORDER — HYDROCODONE BITARTRATE AND ACETAMINOPHEN 5; 325 MG/1; MG/1
1 TABLET ORAL EVERY 4 HOURS PRN
Qty: 12 TABLET | Refills: 0 | Status: SHIPPED | OUTPATIENT
Start: 2017-09-27 | End: 2017-10-23 | Stop reason: SDUPTHER

## 2017-09-27 RX ORDER — KETOROLAC TROMETHAMINE 30 MG/ML
INJECTION, SOLUTION INTRAMUSCULAR; INTRAVENOUS
Status: DISCONTINUED | OUTPATIENT
Start: 2017-09-27 | End: 2017-09-27

## 2017-09-27 RX ORDER — ONDANSETRON HYDROCHLORIDE 2 MG/ML
INJECTION, SOLUTION INTRAMUSCULAR; INTRAVENOUS
Status: DISCONTINUED | OUTPATIENT
Start: 2017-09-27 | End: 2017-09-27

## 2017-09-27 RX ADMIN — KETOROLAC TROMETHAMINE 15 MG: 30 INJECTION, SOLUTION INTRAMUSCULAR; INTRAVENOUS at 08:09

## 2017-09-27 RX ADMIN — DEXAMETHASONE SODIUM PHOSPHATE 4 MG: 4 INJECTION, SOLUTION INTRAMUSCULAR; INTRAVENOUS at 08:09

## 2017-09-27 RX ADMIN — FENTANYL CITRATE 25 MCG: 50 INJECTION INTRAMUSCULAR; INTRAVENOUS at 07:09

## 2017-09-27 RX ADMIN — FENTANYL CITRATE 25 MCG: 50 INJECTION INTRAMUSCULAR; INTRAVENOUS at 08:09

## 2017-09-27 RX ADMIN — LIDOCAINE HYDROCHLORIDE 10 MG: 10 INJECTION, SOLUTION EPIDURAL; INFILTRATION; INTRACAUDAL; PERINEURAL at 07:09

## 2017-09-27 RX ADMIN — CEFAZOLIN SODIUM 2 G: 2 SOLUTION INTRAVENOUS at 08:09

## 2017-09-27 RX ADMIN — BUPIVACAINE HYDROCHLORIDE AND EPINEPHRINE BITARTRATE 30 ML: 5; .0091 INJECTION, SOLUTION EPIDURAL; INTRACAUDAL; PERINEURAL at 07:09

## 2017-09-27 RX ADMIN — HEPARIN SODIUM (PORCINE) LOCK FLUSH IV SOLN 100 UNIT/ML 100 UNITS: 100 SOLUTION at 07:09

## 2017-09-27 RX ADMIN — LIDOCAINE HYDROCHLORIDE 80 MG: 20 INJECTION, SOLUTION INTRAVENOUS at 07:09

## 2017-09-27 RX ADMIN — SODIUM CHLORIDE, SODIUM LACTATE, POTASSIUM CHLORIDE, AND CALCIUM CHLORIDE: 600; 310; 30; 20 INJECTION, SOLUTION INTRAVENOUS at 07:09

## 2017-09-27 RX ADMIN — PROPOFOL 100 MG: 10 INJECTION, EMULSION INTRAVENOUS at 07:09

## 2017-09-27 RX ADMIN — ONDANSETRON 4 MG: 2 INJECTION, SOLUTION INTRAMUSCULAR; INTRAVENOUS at 08:09

## 2017-09-27 RX ADMIN — OXYCODONE HYDROCHLORIDE 5 MG: 5 TABLET ORAL at 09:09

## 2017-09-27 NOTE — DISCHARGE INSTRUCTIONS
Discharge Instructions: After Your Surgery  Youve just had surgery. During surgery, you were given medicine called anesthesia to keep you relaxed and free of pain. After surgery, you may have some pain or nausea. This is common. Here are some tips for feeling better and getting well after surgery.     Stay on schedule with your medicine.   Going home  Your healthcare provider will show you how to take care of yourself when you go home. He or she will also answer your questions. Have an adult family member or friend drive you home. For the first 24 hours after your surgery:  · Do not drive or use heavy equipment.  · Do not make important decisions or sign legal papers.  · Do not drink alcohol.  · Have someone stay with you, if needed. He or she can watch for problems and help keep you safe.  Be sure to go to all follow-up visits with your healthcare provider. And rest after your surgery for as long as your healthcare provider tells you to.  Coping with pain  If you have pain after surgery, pain medicine will help you feel better. Take it as told, before pain becomes severe. Also, ask your healthcare provider or pharmacist about other ways to control pain. This might be with heat, ice, or relaxation. And follow any other instructions your surgeon or nurse gives you.  Tips for taking pain medicine  To get the best relief possible, remember these points:  · Pain medicines can upset your stomach. Taking them with a little food may help.  · Most pain relievers taken by mouth need at least 20 to 30 minutes to start to work.  · Taking medicine on a schedule can help you remember to take it. Try to time your medicine so that you can take it before starting an activity. This might be before you get dressed, go for a walk, or sit down for dinner.  · Constipation is a common side effect of pain medicines. Call your healthcare provider before taking any medicines such as laxatives or stool softeners to help ease constipation.  Also ask if you should skip any foods. Drinking lots of fluids and eating foods such as fruits and vegetables that are high in fiber can also help. Remember, do not take laxatives unless your surgeon has prescribed them.  · Drinking alcohol and taking pain medicine can cause dizziness and slow your breathing. It can even be deadly. Do not drink alcohol while taking pain medicine.  · Pain medicine can make you react more slowly to things. Do not drive or run machinery while taking pain medicine.  Your healthcare provider may tell you to take acetaminophen to help ease your pain. Ask him or her how much you are supposed to take each day. Acetaminophen or other pain relievers may interact with your prescription medicines or other over-the-counter (OTC) medicines. Some prescription medicines have acetaminophen and other ingredients. Using both prescription and OTC acetaminophen for pain can cause you to overdose. Read the labels on your OTC medicines with care. This will help you to clearly know the list of ingredients, how much to take, and any warnings. It may also help you not take too much acetaminophen. If you have questions or do not understand the information, ask your pharmacist or healthcare provider to explain it to you before you take the OTC medicine.  Managing nausea  Some people have an upset stomach after surgery. This is often because of anesthesia, pain, or pain medicine, or the stress of surgery. These tips will help you handle nausea and eat healthy foods as you get better. If you were on a special food plan before surgery, ask your healthcare provider if you should follow it while you get better. These tips may help:  · Do not push yourself to eat. Your body will tell you when to eat and how much.  · Start off with clear liquids and soup. They are easier to digest.  · Next try semi-solid foods, such as mashed potatoes, applesauce, and gelatin, as you feel ready.  · Slowly move to solid foods. Dont  eat fatty, rich, or spicy foods at first.  · Do not force yourself to have 3 large meals a day. Instead eat smaller amounts more often.  · Take pain medicines with a small amount of solid food, such as crackers or toast, to avoid nausea.     Call your surgeon if  · You still have pain an hour after taking medicine. The medicine may not be strong enough.  · You feel too sleepy, dizzy, or groggy. The medicine may be too strong.  · You have side effects like nausea, vomiting, or skin changes, such as rash, itching, or hives.       If you have obstructive sleep apnea  You were given anesthesia medicine during surgery to keep you comfortable and free of pain. After surgery, you may have more apnea spells because of this medicine and other medicines you were given. The spells may last longer than usual.   At home:  · Keep using the continuous positive airway pressure (CPAP) device when you sleep. Unless your healthcare provider tells you not to, use it when you sleep, day or night. CPAP is a common device used to treat obstructive sleep apnea.  · Talk with your provider before taking any pain medicine, muscle relaxants, or sedatives. Your provider will tell you about the possible dangers of taking these medicines.  Date Last Reviewed: 12/1/2016 © 2000-2017 [employer]. [address] [place] [address]. All rights reserved. This information is not intended as a substitute for professional medical care. Always follow your healthcare professional's instructions.      Using Opioids for Pain Management     Your doctor has given instructions for you to take an opioid.  This is a drug for bad pain.  It helps control pain without causing bleeding and kidney problems.  Common opioid names are morphine, hydromorphone, oxycodone, and methadone. These drugs are called narcotics.    There are several safety concerns you need to know.     · It is against the law to give or sell this drug to another person.  You  must keep this medicine safely locked.    · You may have side effects from taking this medication.  These include nausea, itching, sweating, sleepiness, a change in your ability to breathe, and depression.  · Do not take alcohol or sleeping pills opioids.    · Long-term opoid use may no longer giver you relief from pain.  It can cause you stomach pain, mental anxiety, and headaches.  Long-term opoid use can potentially lead to unlawful street drug abuse and reduce your ability to stay employed.    · Your body may become opioid tolerant if you need to take more to get relief.    · You must stop taking opioids if you begin having more pain as a result of the medicine.    · Opioid withdrawal occurs when you have to stop taking the drug.  It can cause you to have nausea, vomiting, diarrhea, stomach pain, anxiety, and dilated pupils in your eyes. This condition means you are opioid dependent.    · Addiction is a drug induced brain disease. It means there are changes in how your brain is working.  Children, teens, and young adults under 25 years old are more likely to get addicted to opioids.      · Addiction can happen with repeated opioid use.  It does not happen with short-term use of two weeks or less.       For more information, please speak with your doctor or pharmacist.    Vascular Access Port Implantation   Port implantation is surgery to place (implant) a port under the skin. For vascular access, it is placed into a vein. The port allows medicines or nutrition to be sent right into your bloodstream. Blood can also be taken or given through the port. During the procedure, a long, thin tube called a catheter is threaded into one of your large veins. The tube is then attached to the port. This usually sits under the skin of your chest and causes a small bump. To use the port, a special needle is passed through your skin and into the port. The needle can stay in your skin for up to 7 days, if needed. A port can stay  in place for weeks or months or longer.    Why is a vascular access port needed?  A vascular access port may allow healthcare providers to give you:  · Chemotherapy or other cancer-fighting drugs  · IV treatments, such as antibiotics or nutrition  · Hemodialysis (for kidney failure)  The port may also be used to draw blood.  Before the procedure  Follow any instructions you are given on how to prepare.  Tell your provider about any medicines you are taking. This includes:  · All prescription medicines  · Over-the-counter medicines such as aspirin or ibuprofen  · Herbs, vitamins, and other supplements  Also be sure your provider knows:  · If you are pregnant or think you may be pregnant  · If you are allergic to any medicines or substances, especially local anesthetics or iodine  · Your full medical history, including why you will need the port  · If you plan on doing any contact sports  During the procedure  · Before the procedure, an IV may be put into a vein in your arm or hand. This gives you fluids and medicines. You may be given medicine through the IV to help you relax during the procedure. This is called sedation. But some surgeons place ports using general anesthesia.  · The chest is used most often for the port. In some cases, your belly (abdomen) or arm will be used instead.  · The skin over the insertion area is numbed with local anesthetic.  · Ultrasound or X-rays are used to help the healthcare provider guide the catheter into the proper location during the procedure.  · A cut (incision) is made in the skin where the port will be placed. A small pocket for the port is formed under the skin.  · A second small incision is made in the skin near the first incision. A tunnel under the skin is created. The catheter is put through the tunnel and into the blood vessel.  · The skin is closed over the port. It is held shut with stitches (sutures) or surgical glue or tape. The second small incision is also  closed.  · A chest X-ray may be done to make sure the port is placed properly.  After the procedure  You may be taken to a recovery room where youll recover from the sedation. Nurses will check on you as you rest. If you have pain, nurses can give you medicine. If you are not staying in the hospital overnight, you will be sent home a few hours after the procedure is done. A healthcare provider will tell you when you can go home. An adult family member or friend will need to drive you home.  Recovering at home  · Take pain medicine as directed by your healthcare provider.  · Take it easy for 24 hours after the procedure. Avoid physical activity and heavy lifting until your healthcare provider says its OK.  · Keep the port clean and dry. Ask when you can shower again. You will need to keep the port dry by covering it when you shower.  · Care for the insertion site as you are directed.  · Dont swim, bathe, or do other activities that cause water to cover the insertion site.  · To keep the port from getting blocked with blood clots, flush it as often as directed. You should be shown the proper way to flush the port before you go home. It is important to follow these directions.     Risks and possible complications of implantation  · Bleeding  · Infection of the insertion site  · Damage to a blood vessel  · Nerve injury or irritation  · Collapsed lung (for chest port placements)  · Skin breakdown over the port  Risks and possible complications of having a port  · Blocked  port or catheter  · Leakage or breakage of the port or catheter  · The port moves out of position  · Blood clot  · Skin or bloodstream infection  · Skin breakdown over the port      When to seek medical care  Call your healthcare provider right away if you have any of the following:  · A fever of 100.4°F (38.0°C) or higher  · You can't access or use the port properly  · You can't flush the port or get a blood return  · The skin near the port is red,  warm, swollen, or broken  · You have shoulder pain on the side where the port is located  · You feel a heart flutter or racing heart   · Swollen arm, if the port is placed in your arm   Date Last Reviewed: 7/1/2016  © 5045-9500 [employer]. [address] [place] [address]. All rights reserved. This information is not intended as a substitute for professional medical care. Always follow your healthcare professional's instructions.

## 2017-09-27 NOTE — OR NURSING
Pt tolerated procedure well. Pt states no complaints. Discharge instructions given to both patient and daughter with verbalized understanding. Pt escorted via w/c to car.

## 2017-09-27 NOTE — OP NOTE
DATE: 9/27/2017    PRE OP DX: Insufficient venous access for chemotherapy    POST OF DX: Same    PROCEDURE: Insertion of right Subclavian PowerPort    SURGEON: Ephraim Greenberg M.D.    ANESTHESIA: General    PROCEDURE AND FINDINGS:  With the patient in the supine trendelenburg position under satisfactory anesthesia the chest and neck were prepped and draped in the usual manner for port insertion.  The skin and subcutaneous tissue in the right subclavicular area was infiltrated with 0.25% Marcaine w/epinephrine.  Venepuncture was performed into the right subclavian vein and guide wire was advanced under fluoroscopic control into the SVC.    Skin incision was made and pocked created for the Power port.  The catheter was cut to 15 cm in length and connected to the port in the standard manner.  It was flushed with heparinized saline.  The catheter was introduced using the supplied peel-away sheath introducer and advanced to the SVC-RA junction using fluoroscopy..  Good flow was noted in both directions.  The port was sutured to the pectoralis fascia using 2-0 vicryl sutures.  Hemostasis was secure and the subcutaneous tissue was approximated with 3-0 vicryl.  The skin incision was closed with a 4-0 monocryl subcuticular stitch.  Steri strips and sterile dressing was applied.    Patient tolerated the procedure well and was sent to recovery room in satisfactory condition.    EBL: 10 cc's    Complications: none    Drains: none    Specimens: none

## 2017-09-27 NOTE — ANESTHESIA PREPROCEDURE EVALUATION
09/27/2017  Delicia Murray is a 77 y.o., female.    Anesthesia Evaluation    I have reviewed the Patient Summary Reports.    I have reviewed the Nursing Notes.   I have reviewed the Medications.     Review of Systems  Anesthesia Hx:  No problems with previous Anesthesia    Social:  Smoker    Hematology/Oncology:         -- Anemia: Current/Recent Cancer.   Cardiovascular:  Cardiovascular Normal     Pulmonary:   COPD    Hepatic/GI:   GERD    Musculoskeletal:  Musculoskeletal Normal    Neurological:  Neurology Normal    Endocrine:  Endocrine Normal    Dermatological:  Skin Normal    Psych:   Psychiatric History depression          Physical Exam  General:  Well nourished    Airway/Jaw/Neck:  Airway Findings: Mouth Opening: Normal Tongue: Normal  General Airway Assessment: Adult  Mallampati: I  TM Distance: Normal, at least 6 cm        Eyes/Ears/Nose:  EYES/EARS/NOSE FINDINGS: Normal   Dental:  Dental Findings: Upper Dentures   Chest/Lungs:  Chest/Lungs Findings: Normal Respiratory Rate, Expiratory Wheezes, Mild, Decreased Breath Sounds Bilateral     Heart/Vascular:  Heart Findings: Rate: Normal  Rhythm: Regular Rhythm  Sounds: Normal     Abdomen:  Abdomen Findings: Normal    Musculoskeletal:  Musculoskeletal Findings: Normal   Skin:  Skin Findings: Normal    Mental Status:  Mental Status Findings: Normal        Anesthesia Plan  Type of Anesthesia, risks & benefits discussed:  Anesthesia Type:  MAC  Patient's Preference:   Intra-op Monitoring Plan: standard ASA monitors  Intra-op Monitoring Plan Comments:   Post Op Pain Control Plan: IV/PO Opioids PRN  Post Op Pain Control Plan Comments:   Induction:   IV  Beta Blocker:  Patient is not currently on a Beta-Blocker (No further documentation required).       Informed Consent: Patient understands risks and agrees with Anesthesia plan.  Questions answered. Anesthesia  consent signed with patient.  ASA Score: 3     Day of Surgery Review of History & Physical: I have interviewed and examined the patient. I have reviewed the patient's H&P dated:    H&P update referred to the surgeon.         Ready For Surgery From Anesthesia Perspective.

## 2017-09-27 NOTE — ANESTHESIA POSTPROCEDURE EVALUATION
"Anesthesia Post Evaluation    Patient: Delicia Murray    Procedure(s) Performed: Procedure(s) (LRB):  [id number] (Right)    Final Anesthesia Type: general  Patient location during evaluation: PACU  Patient participation: Yes- Able to Participate  Level of consciousness: awake and alert  Post-procedure vital signs: reviewed and stable  Pain management: adequate  Airway patency: patent  PONV status at discharge: No PONV  Anesthetic complications: no      Cardiovascular status: hemodynamically stable  Respiratory status: unassisted and room air  Hydration status: euvolemic  Follow-up not needed.        Visit Vitals  /68 (BP Location: Left arm, Patient Position: Lying)   Pulse 80   Temp 36.3 °C (97.4 °F) (Temporal)   Resp 16   Ht 5' 1" (1.549 m)   Wt 67.1 kg (148 lb)   SpO2 (!) 92%   Breastfeeding? Unknown   BMI 27.96 kg/m²       Pain/Laurie Score: Pain Assessment Performed: Yes (9/27/2017 10:15 AM)  Presence of Pain: denies (9/27/2017 10:15 AM)  Pain Rating Prior to Med Admin: 0 (9/27/2017  9:13 AM)  Laurie Score: 10 (9/27/2017 10:15 AM)      "

## 2017-09-27 NOTE — PLAN OF CARE
Pt. AAOx4, calm and cooperative, awaiting surgery for port placement.  RN reviewed plan of care with pt. And her daughter and they verbalized understanding.

## 2017-09-27 NOTE — INTERVAL H&P NOTE
The patient has been examined and the H&P has been reviewed:    I concur with the findings and no changes have occurred since H&P was written.    Anesthesia/Surgery risks, benefits and alternative options discussed and understood by patient/family.          Active Hospital Problems    Diagnosis  POA    Peripheral venous insufficiency [I87.2]  Yes      Resolved Hospital Problems    Diagnosis Date Resolved POA   No resolved problems to display.

## 2017-09-27 NOTE — TRANSFER OF CARE
"Anesthesia Transfer of Care Note    Patient: Delicia Murray    Procedure(s) Performed: Procedure(s) (LRB):  [id number] (Right)    Patient location: PACU    Anesthesia Type: general    Transport from OR: Transported from OR on 2-3 L/min O2 by NC with adequate spontaneous ventilation    Post pain: adequate analgesia    Post assessment: no apparent anesthetic complications    Post vital signs: stable    Level of consciousness: lethargic and responds to stimulation    Nausea/Vomiting: no nausea/vomiting    Complications: none    Transfer of care protocol not completed      Last vitals:   Visit Vitals  BP (!) 151/68 (BP Location: Left arm, Patient Position: Lying)   Pulse 74   Temp 36.8 °C (98.2 °F) (Temporal)   Resp 16   Ht 5' 1" (1.549 m)   Wt 67.1 kg (148 lb)   SpO2 (!) 93%   Breastfeeding? Unknown   BMI 27.96 kg/m²     "

## 2017-09-27 NOTE — DISCHARGE SUMMARY
Discharge Summary  General Surgery      Admit Date: 9/27/2017    Discharge Date :9/27/2017    Attending Physician: Ephraim Greenberg     Discharge Physician: Ephraim Greenberg    Discharged Condition: good    Discharge Diagnosis: Peripheral venous insufficiency [I87.2]    Treatments/Procedures: Procedure(s) (LRB):  [id number] (Right)    Hospital Course: Uneventful; Discharged home from Recovery    Significant Diagnostic Studies: none    Disposition: Home or Self Care    Diet: Regular    Follow up: Office 10-14 days    Activity: No heavy lifting till seen in office.    Patient Instructions:   Current Discharge Medication List      START taking these medications    Details   hydrocodone-acetaminophen 5-325mg (NORCO) 5-325 mg per tablet Take 1 tablet by mouth every 4 (four) hours as needed for Pain.  Qty: 12 tablet, Refills: 0         CONTINUE these medications which have NOT CHANGED    Details   acetaminophen (TYLENOL) 325 MG tablet Take 325 mg by mouth every 6 (six) hours as needed.      albuterol 90 mcg/actuation inhaler 2 puffs every 4 hours as needed for cough, wheeze, or shortness of breath  Qty: 1 Inhaler, Refills: 11    Associated Diagnoses: Pulmonary emphysema, unspecified emphysema type; Bronchiectasis without complication      alprazolam (XANAX) 0.25 MG tablet TAKE 1 TABLET BY MOUTH THREE TIMES DAILY AS NEEDED  Qty: 60 tablet, Refills: 0    Associated Diagnoses: Malignant neoplasm of hilus of lung, unspecified laterality      lidocaine-prilocaine (EMLA) cream Apply to affected area once  Qty: 5 g, Refills: 0    Associated Diagnoses: Malignant neoplasm of hilus of lung, unspecified laterality      mirtazapine (REMERON) 30 MG tablet Take 1 tablet (30 mg total) by mouth every evening.  Qty: 30 tablet, Refills: 4    Associated Diagnoses: Other depression      mouthwashes Soln PHARMACIST:  MIX 50mL Benadryl Elixir; 50mL Viscous Lidocaine; 50mL Nystatin Suspension; 50mL Milk of Magnesia  PATIENT:  Take 5mL by  mouth - Swish and swallow - every 4 hours as needed for mouth/throat pain.  Qty: 200 mL, Refills: 1    Associated Diagnoses: Malignant neoplasm of hilus of lung, unspecified laterality      ondansetron (ZOFRAN-ODT) 8 MG TbDL Take 1 tablet (8 mg total) by mouth every 12 (twelve) hours as needed.  Qty: 30 tablet, Refills: 1    Associated Diagnoses: Malignant neoplasm of hilus of lung, unspecified laterality      pantoprazole (PROTONIX) 40 MG tablet Take 1 tablet (40 mg total) by mouth once daily.  Qty: 90 tablet, Refills: 3    Associated Diagnoses: Centrilobular emphysema      predniSONE (DELTASONE) 20 MG tablet Take 20 mg by mouth once daily.      prochlorperazine (COMPAZINE) 10 MG tablet TAKE 1 TABLET BY MOUTH EVERY 6 HOURS  Qty: 385 tablet, Refills: 1    Comments: **Patient requests 90 days supply**  Associated Diagnoses: Malignant neoplasm of hilus of lung, unspecified laterality      sertraline (ZOLOFT) 100 MG tablet Take 1 tablet (100 mg total) by mouth every evening.  Qty: 90 tablet, Refills: 4    Comments: **Patient requests 90 days supply**  Associated Diagnoses: Other depression      sucralfate (CARAFATE) 1 gram tablet Take 1 tablet (1 g total) by mouth 2 (two) times daily.  Qty: 120 tablet, Refills: 3      umeclidinium-vilanterol (ANORO ELLIPTA) 62.5-25 mcg/actuation DsDv Inhale 1 puff into the lungs once daily.  Qty: 1 each, Refills: 11    Associated Diagnoses: Pulmonary emphysema, unspecified emphysema type; Bronchiectasis without complication               Discharge Procedure Orders  Diet general     Activity as tolerated     Shower on day dressing removed (No bath)     Lifting restrictions     Call MD for:  temperature >100.4     Call MD for:  persistent nausea and vomiting     Call MD for:  severe uncontrolled pain     Call MD for:  difficulty breathing, headache or visual disturbances     Call MD for:  redness, tenderness, or signs of infection (pain, swelling, redness, odor or green/yellow discharge  around incision site)     Call MD for:  hives     Call MD for:  persistent dizziness or light-headedness     Call MD for:  extreme fatigue

## 2017-09-28 VITALS
HEART RATE: 80 BPM | HEIGHT: 61 IN | RESPIRATION RATE: 16 BRPM | OXYGEN SATURATION: 92 % | SYSTOLIC BLOOD PRESSURE: 108 MMHG | DIASTOLIC BLOOD PRESSURE: 68 MMHG | TEMPERATURE: 97 F | BODY MASS INDEX: 27.94 KG/M2 | WEIGHT: 148 LBS

## 2017-10-02 ENCOUNTER — OFFICE VISIT (OUTPATIENT)
Dept: HEMATOLOGY/ONCOLOGY | Facility: CLINIC | Age: 77
End: 2017-10-02
Payer: MEDICARE

## 2017-10-02 VITALS
HEART RATE: 107 BPM | WEIGHT: 147.94 LBS | TEMPERATURE: 99 F | RESPIRATION RATE: 18 BRPM | DIASTOLIC BLOOD PRESSURE: 59 MMHG | SYSTOLIC BLOOD PRESSURE: 119 MMHG | HEIGHT: 61 IN | BODY MASS INDEX: 27.93 KG/M2

## 2017-10-02 DIAGNOSIS — C34.00 MALIGNANT NEOPLASM OF HILUS OF LUNG, UNSPECIFIED LATERALITY: Primary | ICD-10-CM

## 2017-10-02 PROCEDURE — 1126F AMNT PAIN NOTED NONE PRSNT: CPT | Mod: S$GLB,,, | Performed by: INTERNAL MEDICINE

## 2017-10-02 PROCEDURE — 99215 OFFICE O/P EST HI 40 MIN: CPT | Mod: S$GLB,,, | Performed by: INTERNAL MEDICINE

## 2017-10-02 PROCEDURE — 99499 UNLISTED E&M SERVICE: CPT | Mod: S$PBB,,, | Performed by: INTERNAL MEDICINE

## 2017-10-02 PROCEDURE — 99999 PR PBB SHADOW E&M-EST. PATIENT-LVL III: CPT | Mod: PBBFAC,,, | Performed by: INTERNAL MEDICINE

## 2017-10-02 PROCEDURE — 1159F MED LIST DOCD IN RCRD: CPT | Mod: S$GLB,,, | Performed by: INTERNAL MEDICINE

## 2017-10-02 PROCEDURE — 3008F BODY MASS INDEX DOCD: CPT | Mod: S$GLB,,, | Performed by: INTERNAL MEDICINE

## 2017-10-02 RX ORDER — SODIUM CHLORIDE 0.9 % (FLUSH) 0.9 %
10 SYRINGE (ML) INJECTION
Status: CANCELLED | OUTPATIENT
Start: 2017-10-03

## 2017-10-02 RX ORDER — DIPHENHYDRAMINE HYDROCHLORIDE 50 MG/ML
50 INJECTION INTRAMUSCULAR; INTRAVENOUS ONCE AS NEEDED
Status: CANCELLED | OUTPATIENT
Start: 2017-10-03 | End: 2017-10-03

## 2017-10-02 RX ORDER — EPINEPHRINE 0.3 MG/.3ML
0.3 INJECTION SUBCUTANEOUS ONCE AS NEEDED
Status: CANCELLED | OUTPATIENT
Start: 2017-10-03 | End: 2017-10-03

## 2017-10-02 RX ORDER — HEPARIN 100 UNIT/ML
500 SYRINGE INTRAVENOUS
Status: CANCELLED | OUTPATIENT
Start: 2017-10-03

## 2017-10-02 RX ORDER — FAMOTIDINE 10 MG/ML
20 INJECTION INTRAVENOUS
Status: CANCELLED | OUTPATIENT
Start: 2017-10-03

## 2017-10-02 NOTE — PROGRESS NOTES
A 77-year-old  woman who has been following with me in the past for   iron deficiency anemia also has dx of sq ca of lung that was rx with xrt alone 1/2017, was not a good sx candidate, she has been requiring iron infusion periodically for poor absorption   had repeat bx of these new PET findings from 8/2017, KRAS neg, ALK neg , EGFR neg, and ROS-1 neg, here for statr of chemo   cycle 1PHYSICAL EXAM:  Wt Readings from Last 3 Encounters:   10/02/17 67.1 kg (147 lb 14.9 oz)   09/27/17 67.1 kg (148 lb)   09/19/17 67.3 kg (148 lb 5.9 oz)     Temp Readings from Last 3 Encounters:   10/02/17 98.6 °F (37 °C) (Oral)   09/27/17 97.4 °F (36.3 °C) (Temporal)   09/11/17 98.8 °F (37.1 °C)     BP Readings from Last 3 Encounters:   10/02/17 (!) 119/59   09/27/17 108/68   09/19/17 133/70     Pulse Readings from Last 3 Encounters:   10/02/17 107   09/27/17 80   09/19/17 93     GENERAL:  This is a well-built, well-groomed, comfortable patient without any       deformities ambulating to clinic.  The patient is in no distress.    NEURO:  Awake, alert and oriented to time, person and place.  The patient   demonstrates no evidence of depression, anxiety or agitation.  Patient is   cooperative with exam and discussion.    EYES:  Normal conjunctivae and eyelids.  PERRLA 3 to 4 mm without icterus.    ENT AND MOUTH:  External appearance of ears and nose normal without scars,   lesions or masses.  Nasal mucosa, turbinates and septum are normal.  No ENT   drainage and dried blood noted.  No tenderness over frontal or maxillary sinus.    Lips and gums are normal.  Dentition is normal.    NECK:  Supple.  Trachea is central.  No crepitus.  No JVD.  No thyromegaly or   masses.     RESPIRATORY:  No intercostal retractions and no use of accessory muscles of   respirations noted.  No areas of dullness to percussion.  Chest is clear to   auscultation.  No rales, rhonchi or wheezes.  No crepitus.  Good air entry    bilaterally.    CARDIOVASCULAR:  No cardiomegaly.  Normal location.  No thrills or heaviness.    Normal carotid pulse.  No bruits.  S1 and S2 are normally heard without murmurs   or gallops.  All peripheral pulses, including pedal pulses, are present.    ABDOMEN:  Normal abdomen.  No hepatosplenomegaly.  No free fluid.  Bowel sounds   are present.  No hernia noted.  No masses.  No rebound or tenderness.  No   guarding or rigidity.   Umbilicus is midline.    LYMPHATICS:  No axillary, cervical, supraclavicular, submental, or inguinal   lymphadenopathy.    SKIN AND MUSCULOSKELETAL:  There is no evidence of excoriation marks or   ecchymosis.  No rashes.  No pigmented lesions, induration or subcutaneous   nodules.  No sores or abnormal moles.  No cyanosis.  No clubbing.  Nailbed   abnormalities are noted. No joint or skeletal deformities noted.  Normal range   of motion.    BREASTS:  No abnormal masses or discharge.    NEUROLOGIC:  Higher functions are appropriate.   No cranial nerve deficits.    Normal gait.  Normal strength.  Motor and sensory functions are normal.  Deep   tendon reflexes are normal without Babinski's sign or ankle clonus.    GENITAL AND RECTAL:  Exams are deferred.    LABORATORY DATA:   Lab Results   Component Value Date    WBC 8.80 09/27/2017    HGB 14.0 09/27/2017    HCT 42.0 09/27/2017    MCV 83 09/27/2017     09/27/2017     CMP  Sodium   Date Value Ref Range Status   09/27/2017 142 136 - 145 mmol/L Final     Potassium   Date Value Ref Range Status   09/27/2017 3.9 3.5 - 5.1 mmol/L Final     Chloride   Date Value Ref Range Status   09/27/2017 105 95 - 110 mmol/L Final     CO2   Date Value Ref Range Status   09/27/2017 29 23 - 29 mmol/L Final     Glucose   Date Value Ref Range Status   09/27/2017 99 70 - 110 mg/dL Final     BUN, Bld   Date Value Ref Range Status   09/27/2017 7 (L) 8 - 23 mg/dL Final     Creatinine   Date Value Ref Range Status   09/27/2017 0.7 0.5 - 1.4 mg/dL Final      Calcium   Date Value Ref Range Status   09/27/2017 9.7 8.7 - 10.5 mg/dL Final     Total Protein   Date Value Ref Range Status   09/27/2017 7.4 6.0 - 8.4 g/dL Final     Albumin   Date Value Ref Range Status   09/27/2017 3.1 (L) 3.5 - 5.2 g/dL Final     Total Bilirubin   Date Value Ref Range Status   09/27/2017 0.3 0.1 - 1.0 mg/dL Final     Comment:     For infants and newborns, interpretation of results should be based  on gestational age, weight and in agreement with clinical  observations.  Premature Infant recommended reference ranges:  Up to 24 hours.............<8.0 mg/dL  Up to 48 hours............<12.0 mg/dL  3-5 days..................<15.0 mg/dL  6-29 days.................<15.0 mg/dL       Alkaline Phosphatase   Date Value Ref Range Status   09/27/2017 155 (H) 55 - 135 U/L Final     AST   Date Value Ref Range Status   09/27/2017 17 10 - 40 U/L Final     ALT   Date Value Ref Range Status   09/27/2017 12 10 - 44 U/L Final     Anion Gap   Date Value Ref Range Status   09/27/2017 8 8 - 16 mmol/L Final     eGFR if    Date Value Ref Range Status   09/27/2017 >60 >60 mL/min/1.73 m^2 Final     eGFR if non    Date Value Ref Range Status   09/27/2017 >60 >60 mL/min/1.73 m^2 Final     Comment:     Calculation used to obtain the estimated glomerular filtration  rate (eGFR) is the CKD-EPI equation. Since race is unknown   in our information system, the eGFR values for   -American and Non--American patients are given   for each creatinine result.         IMPRESSION:  1.  Progressive i.e., metastatic squamous cell lung cancer diagnosed November 2016, treated with radiation, now showing recurrence and new lesions.  EGFR,ALK, ROS-1, PDL2. All negative  Start with cycle 1 of carbo/ taxol tomorrow rescan after 4 cycles rtc for cycle #2 with cbc, cmp  Has Mouthwash, zofran, compazine , emla  Iron deficiency anemia, has corrected markedly.  We will recheck in about   three to  four months.  3.  Depression.  Continue medications and followup.  4.  COPD.  Continue her nebulizers and follow with Dr. Mono Duncan.

## 2017-10-03 NOTE — PROGRESS NOTES
Dr Ceballos to clarify allergies  States pt has allergy to a product in neulasta  May need neupogen

## 2017-10-04 ENCOUNTER — TELEPHONE (OUTPATIENT)
Dept: HEMATOLOGY/ONCOLOGY | Facility: CLINIC | Age: 77
End: 2017-10-04

## 2017-10-04 NOTE — TELEPHONE ENCOUNTER
----- Message from Bryanna Yoo sent at 10/3/2017 10:21 AM CDT -----  Contact: Daughter - Kerrie Olvera  States that they had been scheduled to come in every 3rd Tuesday of each month and would like to keep it that way.  And to please re-schedule the Monday's appointment of 10/23/2017.  Can you please call Kerrie back at [phone].  Thank you

## 2017-10-05 DIAGNOSIS — F32.89 OTHER DEPRESSION: ICD-10-CM

## 2017-10-05 RX ORDER — MIRTAZAPINE 30 MG/1
TABLET, FILM COATED ORAL
Qty: 30 TABLET | Refills: 0 | Status: SHIPPED | OUTPATIENT
Start: 2017-10-05 | End: 2017-10-06 | Stop reason: SDUPTHER

## 2017-10-05 NOTE — TELEPHONE ENCOUNTER
----- Message from Rebecca Abarca sent at 10/4/2017  3:16 PM CDT -----  Returning your call.  Please call mignon Sy at [phone].

## 2017-10-06 DIAGNOSIS — F32.89 OTHER DEPRESSION: ICD-10-CM

## 2017-10-06 RX ORDER — MIRTAZAPINE 30 MG/1
TABLET, FILM COATED ORAL
Qty: 90 TABLET | Refills: 0 | Status: SHIPPED | OUTPATIENT
Start: 2017-10-06 | End: 2018-01-02 | Stop reason: SDUPTHER

## 2017-10-09 ENCOUNTER — DOCUMENTATION ONLY (OUTPATIENT)
Dept: HEMATOLOGY/ONCOLOGY | Facility: CLINIC | Age: 77
End: 2017-10-09

## 2017-10-09 ENCOUNTER — TELEPHONE (OUTPATIENT)
Dept: HEMATOLOGY/ONCOLOGY | Facility: CLINIC | Age: 77
End: 2017-10-09

## 2017-10-09 NOTE — TELEPHONE ENCOUNTER
Patient notified that if numbness gets any worst to call and make an appointment to be seen. Patient also stated that her nausea is better and that if it reoccurs she will take her nausea meds,. She stated that she is taking the mouthwash for the sore throat.

## 2017-10-09 NOTE — TELEPHONE ENCOUNTER
Spoke with patient in regards to concerns of finger feeling numb and sore throat from vomiting.  notified and will f/u with patient on recommendations.

## 2017-10-09 NOTE — TELEPHONE ENCOUNTER
----- Message from Zelda Restrepo sent at 10/9/2017  8:36 AM CDT -----  Patient stated she would like to speak with doctor or nurse concerning chemotherapy/has question/please call patient back at [phone] to advise.

## 2017-10-12 DIAGNOSIS — F32.89 OTHER DEPRESSION: ICD-10-CM

## 2017-10-12 RX ORDER — MIRTAZAPINE 15 MG/1
TABLET, FILM COATED ORAL
Qty: 30 TABLET | Refills: 0 | Status: SHIPPED | OUTPATIENT
Start: 2017-10-12 | End: 2017-11-13

## 2017-10-12 RX ORDER — SERTRALINE HYDROCHLORIDE 100 MG/1
TABLET, FILM COATED ORAL
Qty: 90 TABLET | Refills: 0 | Status: SHIPPED | OUTPATIENT
Start: 2017-10-12 | End: 2018-01-09 | Stop reason: SDUPTHER

## 2017-10-13 ENCOUNTER — TELEPHONE (OUTPATIENT)
Dept: HEMATOLOGY/ONCOLOGY | Facility: CLINIC | Age: 77
End: 2017-10-13

## 2017-10-13 NOTE — TELEPHONE ENCOUNTER
Call placed from phone room , PT has leg cramps, worsening, Not relieved from NSAIDs or pain meds. Not relieved from compression stockings will seek advice from MD and return pt call       Kms  ----- Message from Jacklyn Myers sent at 10/13/2017  8:11 AM CDT -----  Contact: self  Patient called asking for advice about severe leg cramps after chemo treatment.  Please call patient at [phone]. Thanks!

## 2017-10-21 ENCOUNTER — LAB VISIT (OUTPATIENT)
Dept: LAB | Facility: HOSPITAL | Age: 77
End: 2017-10-21
Attending: INTERNAL MEDICINE
Payer: MEDICARE

## 2017-10-21 DIAGNOSIS — C34.00 MALIGNANT NEOPLASM OF HILUS OF LUNG, UNSPECIFIED LATERALITY: ICD-10-CM

## 2017-10-21 LAB
ALBUMIN SERPL BCP-MCNC: 2.8 G/DL
ALP SERPL-CCNC: 164 U/L
ALT SERPL W/O P-5'-P-CCNC: 12 U/L
ANION GAP SERPL CALC-SCNC: 10 MMOL/L
AST SERPL-CCNC: 15 U/L
BASOPHILS # BLD AUTO: 0 K/UL
BASOPHILS NFR BLD: 0.2 %
BILIRUB SERPL-MCNC: 0.4 MG/DL
BUN SERPL-MCNC: 11 MG/DL
CALCIUM SERPL-MCNC: 9.3 MG/DL
CHLORIDE SERPL-SCNC: 103 MMOL/L
CO2 SERPL-SCNC: 26 MMOL/L
CREAT SERPL-MCNC: 0.7 MG/DL
DIFFERENTIAL METHOD: ABNORMAL
EOSINOPHIL # BLD AUTO: 0.1 K/UL
EOSINOPHIL NFR BLD: 0.8 %
ERYTHROCYTE [DISTWIDTH] IN BLOOD BY AUTOMATED COUNT: 17.7 %
EST. GFR  (AFRICAN AMERICAN): >60 ML/MIN/1.73 M^2
EST. GFR  (NON AFRICAN AMERICAN): >60 ML/MIN/1.73 M^2
GLUCOSE SERPL-MCNC: 109 MG/DL
HCT VFR BLD AUTO: 34.2 %
HGB BLD-MCNC: 11.2 G/DL
LYMPHOCYTES # BLD AUTO: 1.5 K/UL
LYMPHOCYTES NFR BLD: 15.9 %
MCH RBC QN AUTO: 28.4 PG
MCHC RBC AUTO-ENTMCNC: 32.9 G/DL
MCV RBC AUTO: 86 FL
MONOCYTES # BLD AUTO: 0.5 K/UL
MONOCYTES NFR BLD: 5.3 %
NEUTROPHILS # BLD AUTO: 7.4 K/UL
NEUTROPHILS NFR BLD: 77.8 %
PLATELET # BLD AUTO: 287 K/UL
PMV BLD AUTO: 6.5 FL
POTASSIUM SERPL-SCNC: 3.6 MMOL/L
PROT SERPL-MCNC: 7.1 G/DL
RBC # BLD AUTO: 3.96 M/UL
SODIUM SERPL-SCNC: 139 MMOL/L
WBC # BLD AUTO: 9.6 K/UL

## 2017-10-21 PROCEDURE — 80053 COMPREHEN METABOLIC PANEL: CPT

## 2017-10-21 PROCEDURE — 85025 COMPLETE CBC W/AUTO DIFF WBC: CPT

## 2017-10-21 PROCEDURE — 36415 COLL VENOUS BLD VENIPUNCTURE: CPT

## 2017-10-23 ENCOUNTER — OFFICE VISIT (OUTPATIENT)
Dept: HEMATOLOGY/ONCOLOGY | Facility: CLINIC | Age: 77
End: 2017-10-23
Payer: MEDICARE

## 2017-10-23 VITALS
WEIGHT: 145.94 LBS | TEMPERATURE: 99 F | DIASTOLIC BLOOD PRESSURE: 59 MMHG | HEART RATE: 94 BPM | RESPIRATION RATE: 18 BRPM | HEIGHT: 61 IN | BODY MASS INDEX: 27.55 KG/M2 | SYSTOLIC BLOOD PRESSURE: 128 MMHG

## 2017-10-23 DIAGNOSIS — C34.00 MALIGNANT NEOPLASM OF HILUS OF LUNG, UNSPECIFIED LATERALITY: Primary | ICD-10-CM

## 2017-10-23 DIAGNOSIS — C34.11 MALIGNANT NEOPLASM OF UPPER LOBE OF RIGHT LUNG: ICD-10-CM

## 2017-10-23 DIAGNOSIS — C34.00 MALIGNANT NEOPLASM OF HILUS OF LUNG, UNSPECIFIED LATERALITY: ICD-10-CM

## 2017-10-23 PROCEDURE — 99999 PR PBB SHADOW E&M-EST. PATIENT-LVL III: CPT | Mod: PBBFAC,,, | Performed by: INTERNAL MEDICINE

## 2017-10-23 PROCEDURE — 99499 UNLISTED E&M SERVICE: CPT | Mod: S$PBB,,, | Performed by: INTERNAL MEDICINE

## 2017-10-23 PROCEDURE — 99215 OFFICE O/P EST HI 40 MIN: CPT | Mod: S$GLB,,, | Performed by: INTERNAL MEDICINE

## 2017-10-23 RX ORDER — HEPARIN 100 UNIT/ML
500 SYRINGE INTRAVENOUS
Status: CANCELLED | OUTPATIENT
Start: 2017-10-24

## 2017-10-23 RX ORDER — ALPRAZOLAM 0.25 MG/1
TABLET ORAL
Qty: 60 TABLET | Refills: 0 | Status: SHIPPED | OUTPATIENT
Start: 2017-10-23 | End: 2017-11-13 | Stop reason: SDUPTHER

## 2017-10-23 RX ORDER — SODIUM CHLORIDE 0.9 % (FLUSH) 0.9 %
10 SYRINGE (ML) INJECTION
Status: CANCELLED | OUTPATIENT
Start: 2017-10-24

## 2017-10-23 RX ORDER — ONDANSETRON 8 MG/1
8 TABLET, ORALLY DISINTEGRATING ORAL EVERY 12 HOURS PRN
Qty: 30 TABLET | Refills: 1 | Status: SHIPPED | OUTPATIENT
Start: 2017-10-23 | End: 2018-10-23

## 2017-10-23 RX ORDER — EPINEPHRINE 0.3 MG/.3ML
0.3 INJECTION SUBCUTANEOUS ONCE AS NEEDED
Status: CANCELLED | OUTPATIENT
Start: 2017-10-24 | End: 2017-10-24

## 2017-10-23 RX ORDER — HYDROCODONE BITARTRATE AND ACETAMINOPHEN 5; 325 MG/1; MG/1
1 TABLET ORAL EVERY 4 HOURS PRN
Qty: 30 TABLET | Refills: 0 | Status: SHIPPED | OUTPATIENT
Start: 2017-10-23 | End: 2017-12-04 | Stop reason: SDUPTHER

## 2017-10-23 RX ORDER — FAMOTIDINE 10 MG/ML
20 INJECTION INTRAVENOUS
Status: CANCELLED | OUTPATIENT
Start: 2017-10-24

## 2017-10-23 RX ORDER — DIPHENHYDRAMINE HYDROCHLORIDE 50 MG/ML
50 INJECTION INTRAMUSCULAR; INTRAVENOUS ONCE AS NEEDED
Status: CANCELLED | OUTPATIENT
Start: 2017-10-24 | End: 2017-10-24

## 2017-10-23 NOTE — PROGRESS NOTES
A 77-year-old  woman who has been following with me in the past for   iron deficiency anemia also has dx of sq ca of lung that was rx with xrt alone 1/2017, was not a good sx candidate, she has been requiring iron infusion periodically for poor absorption   had repeat bx of these new PET findings from 8/2017, KRAS neg, ALK neg , EGFR neg, and ROS-1 neg, here forsecond cycle of chemo   was trowing up 2-3 days after chemo, allergic to tape and worried about use of onbody injecater. States theres pins and needles to tip of fingers, losing hair  PHYSICAL EXAM:  Wt Readings from Last 3 Encounters:   10/02/17 67.1 kg (147 lb 14.9 oz)   09/27/17 67.1 kg (148 lb)   09/19/17 67.3 kg (148 lb 5.9 oz)     Temp Readings from Last 3 Encounters:   10/02/17 98.6 °F (37 °C) (Oral)   09/27/17 97.4 °F (36.3 °C) (Temporal)   09/11/17 98.8 °F (37.1 °C)     BP Readings from Last 3 Encounters:   10/02/17 (!) 119/59   09/27/17 108/68   09/19/17 133/70     Pulse Readings from Last 3 Encounters:   10/02/17 107   09/27/17 80   09/19/17 93     GENERAL:  This is a well-built, well-groomed, comfortable patient without any       deformities ambulating to clinic.  The patient is in no distress.    NEURO:  Awake, alert and oriented to time, person and place.  The patient   demonstrates no evidence of depression, anxiety or agitation.  Patient is   cooperative with exam and discussion.    EYES:  Normal conjunctivae and eyelids.  PERRLA 3 to 4 mm without icterus.    ENT AND MOUTH:  External appearance of ears and nose normal without scars,   lesions or masses.  Nasal mucosa, turbinates and septum are normal.  No ENT   drainage and dried blood noted.  No tenderness over frontal or maxillary sinus.    Lips and gums are normal.  Dentition is normal.    NECK:  Supple.  Trachea is central.  No crepitus.  No JVD.  No thyromegaly or   masses.     RESPIRATORY:  No intercostal retractions and no use of accessory muscles of   respirations noted.  No  areas of dullness to percussion.  Chest is clear to   auscultation.  No rales, rhonchi or wheezes.  No crepitus.  Good air entry   bilaterally.    CARDIOVASCULAR:  No cardiomegaly.  Normal location.  No thrills or heaviness.    Normal carotid pulse.  No bruits.  S1 and S2 are normally heard without murmurs   or gallops.  All peripheral pulses, including pedal pulses, are present.    ABDOMEN:  Normal abdomen.  No hepatosplenomegaly.  No free fluid.  Bowel sounds   are present.  No hernia noted.  No masses.  No rebound or tenderness.  No   guarding or rigidity.   Umbilicus is midline.    LYMPHATICS:  No axillary, cervical, supraclavicular, submental, or inguinal   lymphadenopathy.    SKIN AND MUSCULOSKELETAL:  There is no evidence of excoriation marks or   ecchymosis.  No rashes.  No pigmented lesions, induration or subcutaneous   nodules.  No sores or abnormal moles.  No cyanosis.  No clubbing.  Nailbed   abnormalities are noted. No joint or skeletal deformities noted.  Normal range   of motion.    BREASTS:  No abnormal masses or discharge.    NEUROLOGIC:  Higher functions are appropriate.   No cranial nerve deficits.    Normal gait.  Normal strength.  Motor and sensory functions are normal.  Deep   tendon reflexes are normal without Babinski's sign or ankle clonus.    GENITAL AND RECTAL:  Exams are deferred.    LABORATORY DATA:   Lab Results   Component Value Date    WBC 9.60 10/21/2017    HGB 11.2 (L) 10/21/2017    HCT 34.2 (L) 10/21/2017    MCV 86 10/21/2017     10/21/2017     CMP  Sodium   Date Value Ref Range Status   10/21/2017 139 136 - 145 mmol/L Final     Potassium   Date Value Ref Range Status   10/21/2017 3.6 3.5 - 5.1 mmol/L Final     Chloride   Date Value Ref Range Status   10/21/2017 103 95 - 110 mmol/L Final     CO2   Date Value Ref Range Status   10/21/2017 26 23 - 29 mmol/L Final     Glucose   Date Value Ref Range Status   10/21/2017 109 70 - 110 mg/dL Final     BUN, Bld   Date Value Ref Range  Status   10/21/2017 11 8 - 23 mg/dL Final     Creatinine   Date Value Ref Range Status   10/21/2017 0.7 0.5 - 1.4 mg/dL Final     Calcium   Date Value Ref Range Status   10/21/2017 9.3 8.7 - 10.5 mg/dL Final     Total Protein   Date Value Ref Range Status   10/21/2017 7.1 6.0 - 8.4 g/dL Final     Albumin   Date Value Ref Range Status   10/21/2017 2.8 (L) 3.5 - 5.2 g/dL Final     Total Bilirubin   Date Value Ref Range Status   10/21/2017 0.4 0.1 - 1.0 mg/dL Final     Comment:     For infants and newborns, interpretation of results should be based  on gestational age, weight and in agreement with clinical  observations.  Premature Infant recommended reference ranges:  Up to 24 hours.............<8.0 mg/dL  Up to 48 hours............<12.0 mg/dL  3-5 days..................<15.0 mg/dL  6-29 days.................<15.0 mg/dL       Alkaline Phosphatase   Date Value Ref Range Status   10/21/2017 164 (H) 55 - 135 U/L Final     AST   Date Value Ref Range Status   10/21/2017 15 10 - 40 U/L Final     ALT   Date Value Ref Range Status   10/21/2017 12 10 - 44 U/L Final     Anion Gap   Date Value Ref Range Status   10/21/2017 10 8 - 16 mmol/L Final     eGFR if    Date Value Ref Range Status   10/21/2017 >60 >60 mL/min/1.73 m^2 Final     eGFR if non    Date Value Ref Range Status   10/21/2017 >60 >60 mL/min/1.73 m^2 Final     Comment:     Calculation used to obtain the estimated glomerular filtration  rate (eGFR) is the CKD-EPI equation. Since race is unknown   in our information system, the eGFR values for   -American and Non--American patients are given   for each creatinine result.         IMPRESSION:  1.  Progressive i.e., metastatic squamous cell lung cancer diagnosed November 2016, treated with radiation, now showing recurrence and new lesions.  EGFR,ALK, ROS-1, PDL2. All negative  Started with  carbo/ taxol here for cycle s/e noted advised antiemetics round the clock rescan  after 4 cycles rtc for cycle #3   with cbc, cmp  Has Mouthwash, zofran, compazine , emla  Iron deficiency anemia, has corrected markedly.  We will recheck in about   three to four months.  3.  Depression.  Continue medications and followup.  4.  COPD.  Continue her nebulizers and follow with Dr. Mono Duncan.

## 2017-11-04 DIAGNOSIS — J43.2 CENTRILOBULAR EMPHYSEMA: ICD-10-CM

## 2017-11-05 RX ORDER — PANTOPRAZOLE SODIUM 40 MG/1
TABLET, DELAYED RELEASE ORAL
Qty: 60 TABLET | Refills: 0 | Status: SHIPPED | OUTPATIENT
Start: 2017-11-05 | End: 2017-11-13

## 2017-11-11 ENCOUNTER — LAB VISIT (OUTPATIENT)
Dept: LAB | Facility: HOSPITAL | Age: 77
End: 2017-11-11
Attending: INTERNAL MEDICINE
Payer: MEDICARE

## 2017-11-11 DIAGNOSIS — C34.00 MALIGNANT NEOPLASM OF HILUS OF LUNG, UNSPECIFIED LATERALITY: ICD-10-CM

## 2017-11-11 LAB
ALBUMIN SERPL BCP-MCNC: 3.1 G/DL
ALP SERPL-CCNC: 207 U/L
ALT SERPL W/O P-5'-P-CCNC: 8 U/L
ANION GAP SERPL CALC-SCNC: 10 MMOL/L
AST SERPL-CCNC: 16 U/L
BASOPHILS # BLD AUTO: 0 K/UL
BASOPHILS NFR BLD: 0.4 %
BILIRUB SERPL-MCNC: 0.3 MG/DL
BUN SERPL-MCNC: 9 MG/DL
CALCIUM SERPL-MCNC: 9.6 MG/DL
CHLORIDE SERPL-SCNC: 106 MMOL/L
CO2 SERPL-SCNC: 24 MMOL/L
CREAT SERPL-MCNC: 0.7 MG/DL
DIFFERENTIAL METHOD: ABNORMAL
EOSINOPHIL # BLD AUTO: 0.1 K/UL
EOSINOPHIL NFR BLD: 0.9 %
ERYTHROCYTE [DISTWIDTH] IN BLOOD BY AUTOMATED COUNT: 19.8 %
EST. GFR  (AFRICAN AMERICAN): >60 ML/MIN/1.73 M^2
EST. GFR  (NON AFRICAN AMERICAN): >60 ML/MIN/1.73 M^2
GLUCOSE SERPL-MCNC: 130 MG/DL
HCT VFR BLD AUTO: 32.6 %
HGB BLD-MCNC: 10.8 G/DL
LYMPHOCYTES # BLD AUTO: 1.2 K/UL
LYMPHOCYTES NFR BLD: 15.8 %
MCH RBC QN AUTO: 29.8 PG
MCHC RBC AUTO-ENTMCNC: 33 G/DL
MCV RBC AUTO: 90 FL
MONOCYTES # BLD AUTO: 0.6 K/UL
MONOCYTES NFR BLD: 8.1 %
NEUTROPHILS # BLD AUTO: 5.9 K/UL
NEUTROPHILS NFR BLD: 74.8 %
PLATELET # BLD AUTO: 246 K/UL
PMV BLD AUTO: 6.8 FL
POTASSIUM SERPL-SCNC: 3.7 MMOL/L
PROT SERPL-MCNC: 7.3 G/DL
RBC # BLD AUTO: 3.61 M/UL
SODIUM SERPL-SCNC: 140 MMOL/L
WBC # BLD AUTO: 7.8 K/UL

## 2017-11-11 PROCEDURE — 85025 COMPLETE CBC W/AUTO DIFF WBC: CPT

## 2017-11-11 PROCEDURE — 80053 COMPREHEN METABOLIC PANEL: CPT

## 2017-11-11 PROCEDURE — 36415 COLL VENOUS BLD VENIPUNCTURE: CPT

## 2017-11-12 DIAGNOSIS — F32.89 OTHER DEPRESSION: ICD-10-CM

## 2017-11-13 ENCOUNTER — DOCUMENTATION ONLY (OUTPATIENT)
Dept: HEMATOLOGY/ONCOLOGY | Facility: CLINIC | Age: 77
End: 2017-11-13

## 2017-11-13 ENCOUNTER — OFFICE VISIT (OUTPATIENT)
Dept: HEMATOLOGY/ONCOLOGY | Facility: CLINIC | Age: 77
End: 2017-11-13
Payer: MEDICARE

## 2017-11-13 VITALS
SYSTOLIC BLOOD PRESSURE: 120 MMHG | WEIGHT: 142.44 LBS | BODY MASS INDEX: 26.89 KG/M2 | TEMPERATURE: 99 F | DIASTOLIC BLOOD PRESSURE: 59 MMHG | RESPIRATION RATE: 18 BRPM | HEIGHT: 61 IN | HEART RATE: 114 BPM

## 2017-11-13 DIAGNOSIS — C34.11 MALIGNANT NEOPLASM OF UPPER LOBE OF RIGHT LUNG: ICD-10-CM

## 2017-11-13 DIAGNOSIS — D70.1 CHEMOTHERAPY INDUCED NEUTROPENIA: ICD-10-CM

## 2017-11-13 DIAGNOSIS — C34.00 MALIGNANT NEOPLASM OF HILUS OF LUNG, UNSPECIFIED LATERALITY: ICD-10-CM

## 2017-11-13 DIAGNOSIS — T45.1X5A CHEMOTHERAPY INDUCED NEUTROPENIA: ICD-10-CM

## 2017-11-13 DIAGNOSIS — D50.0 IRON DEFICIENCY ANEMIA DUE TO CHRONIC BLOOD LOSS: Primary | Chronic | ICD-10-CM

## 2017-11-13 PROCEDURE — 99215 OFFICE O/P EST HI 40 MIN: CPT | Mod: S$GLB,,, | Performed by: INTERNAL MEDICINE

## 2017-11-13 PROCEDURE — 99499 UNLISTED E&M SERVICE: CPT | Mod: S$PBB,,, | Performed by: INTERNAL MEDICINE

## 2017-11-13 PROCEDURE — 99999 PR PBB SHADOW E&M-EST. PATIENT-LVL III: CPT | Mod: PBBFAC,,, | Performed by: INTERNAL MEDICINE

## 2017-11-13 RX ORDER — FAMOTIDINE 10 MG/ML
20 INJECTION INTRAVENOUS
Status: CANCELLED | OUTPATIENT
Start: 2017-11-14

## 2017-11-13 RX ORDER — DIPHENHYDRAMINE HYDROCHLORIDE 50 MG/ML
50 INJECTION INTRAMUSCULAR; INTRAVENOUS ONCE AS NEEDED
Status: CANCELLED | OUTPATIENT
Start: 2017-11-14 | End: 2017-11-14

## 2017-11-13 RX ORDER — EPINEPHRINE 0.3 MG/.3ML
0.3 INJECTION SUBCUTANEOUS ONCE AS NEEDED
Status: CANCELLED | OUTPATIENT
Start: 2017-11-14 | End: 2017-11-14

## 2017-11-13 RX ORDER — ALPRAZOLAM 0.25 MG/1
TABLET ORAL
Qty: 60 TABLET | Refills: 0 | Status: SHIPPED | OUTPATIENT
Start: 2017-11-13 | End: 2017-12-20 | Stop reason: SDUPTHER

## 2017-11-13 RX ORDER — HEPARIN 100 UNIT/ML
500 SYRINGE INTRAVENOUS
Status: CANCELLED | OUTPATIENT
Start: 2017-11-14

## 2017-11-13 RX ORDER — SODIUM CHLORIDE 0.9 % (FLUSH) 0.9 %
10 SYRINGE (ML) INJECTION
Status: CANCELLED | OUTPATIENT
Start: 2017-11-14

## 2017-11-13 RX ORDER — MIRTAZAPINE 15 MG/1
TABLET, FILM COATED ORAL
Qty: 30 TABLET | Refills: 3 | Status: SHIPPED | OUTPATIENT
Start: 2017-11-13 | End: 2018-02-19 | Stop reason: SDUPTHER

## 2017-11-13 NOTE — PROGRESS NOTES
A 77-year-old  woman who has been following with me in the past for   iron deficiency anemia also has dx of sq ca of lung that was rx with xrt alone 1/2017, was not a good sx candidate, she has been requiring iron infusion periodically for poor absorption   had repeat bx of these new PET findings from 8/2017, KRAS neg, ALK neg , EGFR neg, and ROS-1 neg, here for 3rd cycle of chemo   was throwing up 2-3 days after chemo but not as bad as last cycle, numb fingers, States theres pins and needles to tip of fingers, losing hair has wig on, has discussed dose reduction and pt doenst want to lower her dose  PHYSICAL EXAM:  Wt Readings from Last 3 Encounters:   11/13/17 64.6 kg (142 lb 6.7 oz)   10/23/17 66.2 kg (145 lb 15.1 oz)   10/02/17 67.1 kg (147 lb 14.9 oz)     Temp Readings from Last 3 Encounters:   11/13/17 98.5 °F (36.9 °C)   10/23/17 98.8 °F (37.1 °C)   10/02/17 98.6 °F (37 °C) (Oral)     BP Readings from Last 3 Encounters:   11/13/17 (!) 120/59   10/23/17 (!) 128/59   10/02/17 (!) 119/59     Pulse Readings from Last 3 Encounters:   11/13/17 (!) 114   10/23/17 94   10/02/17 107     GENERAL:  This is a well-built, well-groomed, comfortable patient without any       deformities ambulating to clinic.  The patient is in no distress.    NEURO:  Awake, alert and oriented to time, person and place.  The patient   demonstrates no evidence of depression, anxiety or agitation.  Patient is   cooperative with exam and discussion.    EYES:  Normal conjunctivae and eyelids.  PERRLA 3 to 4 mm without icterus.    ENT AND MOUTH:  External appearance of ears and nose normal without scars,   lesions or masses.  Nasal mucosa, turbinates and septum are normal.  No ENT   drainage and dried blood noted.  No tenderness over frontal or maxillary sinus.    Lips and gums are normal.  Dentition is normal.    NECK:  Supple.  Trachea is central.  No crepitus.  No JVD.  No thyromegaly or   masses.     RESPIRATORY:  No intercostal  retractions and no use of accessory muscles of   respirations noted.  No areas of dullness to percussion.  Chest is clear to   auscultation.  No rales, rhonchi or wheezes.  No crepitus.  Good air entry   bilaterally.    CARDIOVASCULAR:  No cardiomegaly.  Normal location.  No thrills or heaviness.    Normal carotid pulse.  No bruits.  S1 and S2 are normally heard without murmurs   or gallops.  All peripheral pulses, including pedal pulses, are present.    ABDOMEN:  Normal abdomen.  No hepatosplenomegaly.  No free fluid.  Bowel sounds   are present.  No hernia noted.  No masses.  No rebound or tenderness.  No   guarding or rigidity.   Umbilicus is midline.    LYMPHATICS:  No axillary, cervical, supraclavicular, submental, or inguinal   lymphadenopathy.    SKIN AND MUSCULOSKELETAL:  There is no evidence of excoriation marks or   ecchymosis.  No rashes.  No pigmented lesions, induration or subcutaneous   nodules.  No sores or abnormal moles.  No cyanosis.  No clubbing.  Nailbed   abnormalities are noted. No joint or skeletal deformities noted.  Normal range   of motion.    BREASTS:  No abnormal masses or discharge.    NEUROLOGIC:  Higher functions are appropriate.   No cranial nerve deficits.    Normal gait.  Normal strength.  Motor and sensory functions are normal.  Deep   tendon reflexes are normal without Babinski's sign or ankle clonus.    GENITAL AND RECTAL:  Exams are deferred.    LABORATORY DATA:   Lab Results   Component Value Date    WBC 7.80 11/11/2017    HGB 10.8 (L) 11/11/2017    HCT 32.6 (L) 11/11/2017    MCV 90 11/11/2017     11/11/2017     CMP  Sodium   Date Value Ref Range Status   11/11/2017 140 136 - 145 mmol/L Final     Potassium   Date Value Ref Range Status   11/11/2017 3.7 3.5 - 5.1 mmol/L Final     Chloride   Date Value Ref Range Status   11/11/2017 106 95 - 110 mmol/L Final     CO2   Date Value Ref Range Status   11/11/2017 24 23 - 29 mmol/L Final     Glucose   Date Value Ref Range Status    11/11/2017 130 (H) 70 - 110 mg/dL Final     BUN, Bld   Date Value Ref Range Status   11/11/2017 9 8 - 23 mg/dL Final     Creatinine   Date Value Ref Range Status   11/11/2017 0.7 0.5 - 1.4 mg/dL Final     Calcium   Date Value Ref Range Status   11/11/2017 9.6 8.7 - 10.5 mg/dL Final     Total Protein   Date Value Ref Range Status   11/11/2017 7.3 6.0 - 8.4 g/dL Final     Albumin   Date Value Ref Range Status   11/11/2017 3.1 (L) 3.5 - 5.2 g/dL Final     Total Bilirubin   Date Value Ref Range Status   11/11/2017 0.3 0.1 - 1.0 mg/dL Final     Comment:     For infants and newborns, interpretation of results should be based  on gestational age, weight and in agreement with clinical  observations.  Premature Infant recommended reference ranges:  Up to 24 hours.............<8.0 mg/dL  Up to 48 hours............<12.0 mg/dL  3-5 days..................<15.0 mg/dL  6-29 days.................<15.0 mg/dL       Alkaline Phosphatase   Date Value Ref Range Status   11/11/2017 207 (H) 55 - 135 U/L Final     AST   Date Value Ref Range Status   11/11/2017 16 10 - 40 U/L Final     ALT   Date Value Ref Range Status   11/11/2017 8 (L) 10 - 44 U/L Final     Anion Gap   Date Value Ref Range Status   11/11/2017 10 8 - 16 mmol/L Final     eGFR if    Date Value Ref Range Status   11/11/2017 >60 >60 mL/min/1.73 m^2 Final     eGFR if non    Date Value Ref Range Status   11/11/2017 >60 >60 mL/min/1.73 m^2 Final     Comment:     Calculation used to obtain the estimated glomerular filtration  rate (eGFR) is the CKD-EPI equation.          IMPRESSION:  1.  Progressive i.e., metastatic squamous cell lung cancer diagnosed November 2016, treated with radiation, now showing recurrence and new lesions.  EGFR,ALK, ROS-1, PDL2. All negative  Started with  carbo/ taxol here for cycle s/e noted advised antiemetics round the clock rescan after 4 cycles rtc for cycle #5   with cbc, cmp  Has Mouthwash, zofran, compazine ,  emla  Iron deficiency anemia, has corrected markedly.  We will recheck in about   three to four months.  3.  Depression.  Continue medications and followup.  4.  COPD.  Continue her nebulizers and follow with Dr. Mono Duncan.

## 2017-11-21 ENCOUNTER — DOCUMENTATION ONLY (OUTPATIENT)
Dept: HEMATOLOGY/ONCOLOGY | Facility: CLINIC | Age: 77
End: 2017-11-21

## 2017-12-02 ENCOUNTER — LAB VISIT (OUTPATIENT)
Dept: LAB | Facility: HOSPITAL | Age: 77
End: 2017-12-02
Attending: INTERNAL MEDICINE
Payer: MEDICARE

## 2017-12-02 DIAGNOSIS — C34.00 MALIGNANT NEOPLASM OF HILUS OF LUNG, UNSPECIFIED LATERALITY: ICD-10-CM

## 2017-12-02 LAB
ALBUMIN SERPL BCP-MCNC: 3.3 G/DL
ALP SERPL-CCNC: 205 U/L
ALT SERPL W/O P-5'-P-CCNC: 10 U/L
ANION GAP SERPL CALC-SCNC: 12 MMOL/L
ANISOCYTOSIS BLD QL SMEAR: SLIGHT
AST SERPL-CCNC: 19 U/L
BASOPHILS # BLD AUTO: 0 K/UL
BASOPHILS NFR BLD: 0.3 %
BILIRUB SERPL-MCNC: 0.4 MG/DL
BUN SERPL-MCNC: 8 MG/DL
CALCIUM SERPL-MCNC: 9.5 MG/DL
CHLORIDE SERPL-SCNC: 103 MMOL/L
CO2 SERPL-SCNC: 24 MMOL/L
CREAT SERPL-MCNC: 0.7 MG/DL
DIFFERENTIAL METHOD: ABNORMAL
EOSINOPHIL # BLD AUTO: 0.1 K/UL
EOSINOPHIL NFR BLD: 0.8 %
ERYTHROCYTE [DISTWIDTH] IN BLOOD BY AUTOMATED COUNT: 20.3 %
EST. GFR  (AFRICAN AMERICAN): >60 ML/MIN/1.73 M^2
EST. GFR  (NON AFRICAN AMERICAN): >60 ML/MIN/1.73 M^2
GLUCOSE SERPL-MCNC: 127 MG/DL
HCT VFR BLD AUTO: 34.5 %
HGB BLD-MCNC: 11.1 G/DL
LYMPHOCYTES # BLD AUTO: 1.5 K/UL
LYMPHOCYTES NFR BLD: 17.5 %
MCH RBC QN AUTO: 29.8 PG
MCHC RBC AUTO-ENTMCNC: 32.3 G/DL
MCV RBC AUTO: 92 FL
MONOCYTES # BLD AUTO: 0.5 K/UL
MONOCYTES NFR BLD: 6.1 %
NEUTROPHILS # BLD AUTO: 6.6 K/UL
NEUTROPHILS NFR BLD: 75.3 %
PLATELET # BLD AUTO: 225 K/UL
PMV BLD AUTO: 7 FL
POLYCHROMASIA BLD QL SMEAR: ABNORMAL
POTASSIUM SERPL-SCNC: 4.1 MMOL/L
PROT SERPL-MCNC: 7.8 G/DL
RBC # BLD AUTO: 3.74 M/UL
SODIUM SERPL-SCNC: 139 MMOL/L
WBC # BLD AUTO: 8.8 K/UL

## 2017-12-02 PROCEDURE — 85025 COMPLETE CBC W/AUTO DIFF WBC: CPT

## 2017-12-02 PROCEDURE — 80053 COMPREHEN METABOLIC PANEL: CPT

## 2017-12-02 PROCEDURE — 36415 COLL VENOUS BLD VENIPUNCTURE: CPT

## 2017-12-04 ENCOUNTER — OFFICE VISIT (OUTPATIENT)
Dept: HEMATOLOGY/ONCOLOGY | Facility: CLINIC | Age: 77
End: 2017-12-04
Payer: MEDICARE

## 2017-12-04 VITALS
HEART RATE: 113 BPM | HEIGHT: 61 IN | WEIGHT: 140 LBS | DIASTOLIC BLOOD PRESSURE: 65 MMHG | BODY MASS INDEX: 26.43 KG/M2 | TEMPERATURE: 98 F | RESPIRATION RATE: 18 BRPM | SYSTOLIC BLOOD PRESSURE: 145 MMHG

## 2017-12-04 DIAGNOSIS — D12.2 BENIGN NEOPLASM OF ASCENDING COLON: Primary | ICD-10-CM

## 2017-12-04 DIAGNOSIS — C34.11 MALIGNANT NEOPLASM OF UPPER LOBE OF RIGHT LUNG: ICD-10-CM

## 2017-12-04 DIAGNOSIS — D50.0 IRON DEFICIENCY ANEMIA DUE TO CHRONIC BLOOD LOSS: Chronic | ICD-10-CM

## 2017-12-04 DIAGNOSIS — C34.00 MALIGNANT NEOPLASM OF HILUS OF LUNG, UNSPECIFIED LATERALITY: ICD-10-CM

## 2017-12-04 PROCEDURE — 99999 PR PBB SHADOW E&M-EST. PATIENT-LVL III: CPT | Mod: PBBFAC,,, | Performed by: INTERNAL MEDICINE

## 2017-12-04 PROCEDURE — 99499 UNLISTED E&M SERVICE: CPT | Mod: S$PBB,,, | Performed by: INTERNAL MEDICINE

## 2017-12-04 PROCEDURE — 99215 OFFICE O/P EST HI 40 MIN: CPT | Mod: S$GLB,,, | Performed by: INTERNAL MEDICINE

## 2017-12-04 RX ORDER — HYDROCODONE BITARTRATE AND ACETAMINOPHEN 5; 325 MG/1; MG/1
1 TABLET ORAL EVERY 4 HOURS PRN
Qty: 30 TABLET | Refills: 0 | Status: SHIPPED | OUTPATIENT
Start: 2017-12-04 | End: 2018-01-29 | Stop reason: SDUPTHER

## 2017-12-04 RX ORDER — DIPHENHYDRAMINE HYDROCHLORIDE 50 MG/ML
50 INJECTION INTRAMUSCULAR; INTRAVENOUS ONCE AS NEEDED
Status: CANCELLED | OUTPATIENT
Start: 2017-12-05 | End: 2017-12-05

## 2017-12-04 RX ORDER — HEPARIN 100 UNIT/ML
500 SYRINGE INTRAVENOUS
Status: CANCELLED | OUTPATIENT
Start: 2017-12-05

## 2017-12-04 RX ORDER — SODIUM CHLORIDE 0.9 % (FLUSH) 0.9 %
10 SYRINGE (ML) INJECTION
Status: CANCELLED | OUTPATIENT
Start: 2017-12-05

## 2017-12-04 RX ORDER — HYDROCODONE BITARTRATE AND ACETAMINOPHEN 5; 325 MG/1; MG/1
1 TABLET ORAL EVERY 4 HOURS PRN
Qty: 30 TABLET | Refills: 0 | Status: SHIPPED | OUTPATIENT
Start: 2017-12-04 | End: 2017-12-27 | Stop reason: SDUPTHER

## 2017-12-04 RX ORDER — FAMOTIDINE 10 MG/ML
20 INJECTION INTRAVENOUS
Status: CANCELLED | OUTPATIENT
Start: 2017-12-05

## 2017-12-04 RX ORDER — EPINEPHRINE 0.3 MG/.3ML
0.3 INJECTION SUBCUTANEOUS ONCE AS NEEDED
Status: CANCELLED | OUTPATIENT
Start: 2017-12-05 | End: 2017-12-05

## 2017-12-04 NOTE — PROGRESS NOTES
A 77-year-old  woman who has been following with me in the past for   iron deficiency anemia also has dx of sq ca of lung that was rx with xrt alone 1/2017, was not a good sx candidate, she has been requiring iron infusion periodically for poor absorption   had repeat bx of these new PET findings from 8/2017, KRAS neg, ALK neg , EGFR neg, and ROS-1 neg, here for discussing  and proceeding  with  Cycle #4   no specific complaints  PHYSICAL EXAM:  Wt Readings from Last 3 Encounters:   11/13/17 64.6 kg (142 lb 6.7 oz)   10/23/17 66.2 kg (145 lb 15.1 oz)   10/02/17 67.1 kg (147 lb 14.9 oz)     Temp Readings from Last 3 Encounters:   11/13/17 98.5 °F (36.9 °C)   10/23/17 98.8 °F (37.1 °C)   10/02/17 98.6 °F (37 °C) (Oral)     BP Readings from Last 3 Encounters:   11/13/17 (!) 120/59   10/23/17 (!) 128/59   10/02/17 (!) 119/59     Pulse Readings from Last 3 Encounters:   11/13/17 (!) 114   10/23/17 94   10/02/17 107     GENERAL:  This is a well-built, well-groomed, comfortable patient without any       deformities ambulating to clinic.  The patient is in no distress.    NEURO:  Awake, alert and oriented to time, person and place.  The patient   demonstrates no evidence of depression, anxiety or agitation.  Patient is   cooperative with exam and discussion.    EYES:  Normal conjunctivae and eyelids.  PERRLA 3 to 4 mm without icterus.    ENT AND MOUTH:  External appearance of ears and nose normal without scars,   lesions or masses.  Nasal mucosa, turbinates and septum are normal.  No ENT   drainage and dried blood noted.  No tenderness over frontal or maxillary sinus.    Lips and gums are normal.  Dentition is normal.    NECK:  Supple.  Trachea is central.  No crepitus.  No JVD.  No thyromegaly or   masses.     RESPIRATORY:  No intercostal retractions and no use of accessory muscles of   respirations noted.  No areas of dullness to percussion.  Chest is clear to   auscultation.  No rales, rhonchi or wheezes.  No  crepitus.  Good air entry   bilaterally.    CARDIOVASCULAR:  No cardiomegaly.  Normal location.  No thrills or heaviness.    Normal carotid pulse.  No bruits.  S1 and S2 are normally heard without murmurs   or gallops.  All peripheral pulses, including pedal pulses, are present.    ABDOMEN:  Normal abdomen.  No hepatosplenomegaly.  No free fluid.  Bowel sounds   are present.  No hernia noted.  No masses.  No rebound or tenderness.  No   guarding or rigidity.   Umbilicus is midline.    LYMPHATICS:  No axillary, cervical, supraclavicular, submental, or inguinal   lymphadenopathy.    SKIN AND MUSCULOSKELETAL:  There is no evidence of excoriation marks or   ecchymosis.  No rashes.  No pigmented lesions, induration or subcutaneous   nodules.  No sores or abnormal moles.  No cyanosis.  No clubbing.  Nailbed   abnormalities are noted. No joint or skeletal deformities noted.  Normal range   of motion.    BREASTS:  No abnormal masses or discharge.    NEUROLOGIC:  Higher functions are appropriate.   No cranial nerve deficits.    Normal gait.  Normal strength.  Motor and sensory functions are normal.  Deep   tendon reflexes are normal without Babinski's sign or ankle clonus.    GENITAL AND RECTAL:  Exams are deferred.    LABORATORY DATA:   Lab Results   Component Value Date    WBC 8.80 12/02/2017    HGB 11.1 (L) 12/02/2017    HCT 34.5 (L) 12/02/2017    MCV 92 12/02/2017     12/02/2017     CMP  Sodium   Date Value Ref Range Status   12/02/2017 139 136 - 145 mmol/L Final     Potassium   Date Value Ref Range Status   12/02/2017 4.1 3.5 - 5.1 mmol/L Final     Chloride   Date Value Ref Range Status   12/02/2017 103 95 - 110 mmol/L Final     CO2   Date Value Ref Range Status   12/02/2017 24 23 - 29 mmol/L Final     Glucose   Date Value Ref Range Status   12/02/2017 127 (H) 70 - 110 mg/dL Final     BUN, Bld   Date Value Ref Range Status   12/02/2017 8 8 - 23 mg/dL Final     Creatinine   Date Value Ref Range Status   12/02/2017  0.7 0.5 - 1.4 mg/dL Final     Calcium   Date Value Ref Range Status   12/02/2017 9.5 8.7 - 10.5 mg/dL Final     Total Protein   Date Value Ref Range Status   12/02/2017 7.8 6.0 - 8.4 g/dL Final     Albumin   Date Value Ref Range Status   12/02/2017 3.3 (L) 3.5 - 5.2 g/dL Final     Total Bilirubin   Date Value Ref Range Status   12/02/2017 0.4 0.1 - 1.0 mg/dL Final     Comment:     For infants and newborns, interpretation of results should be based  on gestational age, weight and in agreement with clinical  observations.  Premature Infant recommended reference ranges:  Up to 24 hours.............<8.0 mg/dL  Up to 48 hours............<12.0 mg/dL  3-5 days..................<15.0 mg/dL  6-29 days.................<15.0 mg/dL       Alkaline Phosphatase   Date Value Ref Range Status   12/02/2017 205 (H) 55 - 135 U/L Final     AST   Date Value Ref Range Status   12/02/2017 19 10 - 40 U/L Final     ALT   Date Value Ref Range Status   12/02/2017 10 10 - 44 U/L Final     Anion Gap   Date Value Ref Range Status   12/02/2017 12 8 - 16 mmol/L Final     eGFR if    Date Value Ref Range Status   12/02/2017 >60 >60 mL/min/1.73 m^2 Final     eGFR if non    Date Value Ref Range Status   12/02/2017 >60 >60 mL/min/1.73 m^2 Final     Comment:     Calculation used to obtain the estimated glomerular filtration  rate (eGFR) is the CKD-EPI equation.          IMPRESSION:  1.  Progressive i.e., metastatic squamous cell lung cancer diagnosed November 2016, treated with radiation, now showing recurrence and new lesions.  EGFR,ALK, ROS-1, PDL2. All negative  Started with  carbo/ taxol here for cycle s/e noted advised antiemetics round the clock rescan after ok to proceed with cycle #4    rescan PET after cycle #4 ordered for start of 2018, pt would like a rx delay of a week due to family visiting at [facility] [place]    Has Mouthwash, zofran, compazine , emla  Iron deficiency anemia, has corrected markedly.  We will  recheck in about   three to four months.   hydrocodone used around [place], refilled  3.  Depression.  Continue medications and followup.  4.  COPD.  Continue her nebulizers and follow with Dr. Mono Duncan.

## 2017-12-20 DIAGNOSIS — C34.00 MALIGNANT NEOPLASM OF HILUS OF LUNG, UNSPECIFIED LATERALITY: ICD-10-CM

## 2017-12-20 RX ORDER — ALPRAZOLAM 0.25 MG/1
TABLET ORAL
Qty: 60 TABLET | Refills: 0 | Status: SHIPPED | OUTPATIENT
Start: 2017-12-20 | End: 2018-01-25 | Stop reason: SDUPTHER

## 2017-12-27 ENCOUNTER — LAB VISIT (OUTPATIENT)
Dept: LAB | Facility: HOSPITAL | Age: 77
End: 2017-12-27
Attending: INTERNAL MEDICINE
Payer: MEDICARE

## 2017-12-27 ENCOUNTER — OFFICE VISIT (OUTPATIENT)
Dept: HEMATOLOGY/ONCOLOGY | Facility: CLINIC | Age: 77
End: 2017-12-27
Payer: MEDICARE

## 2017-12-27 VITALS
RESPIRATION RATE: 20 BRPM | WEIGHT: 138 LBS | HEART RATE: 108 BPM | SYSTOLIC BLOOD PRESSURE: 132 MMHG | TEMPERATURE: 98 F | HEIGHT: 61 IN | DIASTOLIC BLOOD PRESSURE: 60 MMHG | BODY MASS INDEX: 26.06 KG/M2

## 2017-12-27 DIAGNOSIS — D12.2 BENIGN NEOPLASM OF ASCENDING COLON: ICD-10-CM

## 2017-12-27 DIAGNOSIS — C34.00 MALIGNANT NEOPLASM OF HILUS OF LUNG, UNSPECIFIED LATERALITY: Primary | ICD-10-CM

## 2017-12-27 DIAGNOSIS — J44.9 COPD MIXED TYPE: ICD-10-CM

## 2017-12-27 DIAGNOSIS — C34.11 MALIGNANT NEOPLASM OF UPPER LOBE OF RIGHT LUNG: ICD-10-CM

## 2017-12-27 LAB
ALBUMIN SERPL BCP-MCNC: 3.3 G/DL
ALP SERPL-CCNC: 189 U/L
ALT SERPL W/O P-5'-P-CCNC: 15 U/L
ANION GAP SERPL CALC-SCNC: 10 MMOL/L
AST SERPL-CCNC: 22 U/L
BASOPHILS # BLD AUTO: 0 K/UL
BASOPHILS NFR BLD: 0.6 %
BILIRUB SERPL-MCNC: 0.4 MG/DL
BUN SERPL-MCNC: 9 MG/DL
CALCIUM SERPL-MCNC: 9.4 MG/DL
CHLORIDE SERPL-SCNC: 104 MMOL/L
CO2 SERPL-SCNC: 24 MMOL/L
CREAT SERPL-MCNC: 0.7 MG/DL
DIFFERENTIAL METHOD: ABNORMAL
EOSINOPHIL # BLD AUTO: 0.1 K/UL
EOSINOPHIL NFR BLD: 0.8 %
ERYTHROCYTE [DISTWIDTH] IN BLOOD BY AUTOMATED COUNT: 20.4 %
EST. GFR  (AFRICAN AMERICAN): >60 ML/MIN/1.73 M^2
EST. GFR  (NON AFRICAN AMERICAN): >60 ML/MIN/1.73 M^2
GLUCOSE SERPL-MCNC: 124 MG/DL
HCT VFR BLD AUTO: 34.1 %
HGB BLD-MCNC: 11.4 G/DL
LYMPHOCYTES # BLD AUTO: 1.3 K/UL
LYMPHOCYTES NFR BLD: 16.4 %
MCH RBC QN AUTO: 30.8 PG
MCHC RBC AUTO-ENTMCNC: 33.3 G/DL
MCV RBC AUTO: 93 FL
MONOCYTES # BLD AUTO: 0.5 K/UL
MONOCYTES NFR BLD: 6.8 %
NEUTROPHILS # BLD AUTO: 5.8 K/UL
NEUTROPHILS NFR BLD: 75.4 %
PLATELET # BLD AUTO: 113 K/UL
PMV BLD AUTO: 7.4 FL
POTASSIUM SERPL-SCNC: 4 MMOL/L
PROT SERPL-MCNC: 7.6 G/DL
RBC # BLD AUTO: 3.69 M/UL
SODIUM SERPL-SCNC: 138 MMOL/L
WBC # BLD AUTO: 7.8 K/UL

## 2017-12-27 PROCEDURE — 85025 COMPLETE CBC W/AUTO DIFF WBC: CPT

## 2017-12-27 PROCEDURE — 80053 COMPREHEN METABOLIC PANEL: CPT

## 2017-12-27 PROCEDURE — 99499 UNLISTED E&M SERVICE: CPT | Mod: S$PBB,,, | Performed by: INTERNAL MEDICINE

## 2017-12-27 PROCEDURE — 99999 PR PBB SHADOW E&M-EST. PATIENT-LVL V: CPT | Mod: PBBFAC,,, | Performed by: INTERNAL MEDICINE

## 2017-12-27 PROCEDURE — 99215 OFFICE O/P EST HI 40 MIN: CPT | Mod: S$GLB,,, | Performed by: INTERNAL MEDICINE

## 2017-12-27 PROCEDURE — 36415 COLL VENOUS BLD VENIPUNCTURE: CPT

## 2017-12-27 RX ORDER — DIPHENHYDRAMINE HYDROCHLORIDE 50 MG/ML
50 INJECTION INTRAMUSCULAR; INTRAVENOUS ONCE AS NEEDED
Status: CANCELLED | OUTPATIENT
Start: 2018-01-02 | End: 2017-12-27

## 2017-12-27 RX ORDER — HEPARIN 100 UNIT/ML
500 SYRINGE INTRAVENOUS
Status: CANCELLED | OUTPATIENT
Start: 2018-01-02

## 2017-12-27 RX ORDER — SODIUM CHLORIDE 0.9 % (FLUSH) 0.9 %
10 SYRINGE (ML) INJECTION
Status: CANCELLED | OUTPATIENT
Start: 2018-01-02

## 2017-12-27 RX ORDER — EPINEPHRINE 0.3 MG/.3ML
0.3 INJECTION SUBCUTANEOUS ONCE AS NEEDED
Status: CANCELLED | OUTPATIENT
Start: 2018-01-02 | End: 2017-12-27

## 2017-12-27 RX ORDER — AZITHROMYCIN 250 MG/1
TABLET, FILM COATED ORAL
Qty: 6 TABLET | Refills: 0 | Status: SHIPPED | OUTPATIENT
Start: 2017-12-27 | End: 2018-01-01

## 2017-12-27 RX ORDER — AZITHROMYCIN 250 MG/1
TABLET, FILM COATED ORAL
Qty: 6 TABLET | Refills: 0 | Status: SHIPPED | OUTPATIENT
Start: 2017-12-27 | End: 2017-12-27 | Stop reason: SDUPTHER

## 2017-12-27 RX ORDER — FAMOTIDINE 10 MG/ML
20 INJECTION INTRAVENOUS
Status: CANCELLED | OUTPATIENT
Start: 2018-01-02

## 2017-12-27 NOTE — PROGRESS NOTES
A 77-year-old  woman who has been following with me in the past for   iron deficiency anemia also has dx of sq ca of lung that was rx with xrt alone 1/2017, was not a good sx candidate, she has been requiring iron infusion periodically for poor absorption   had repeat bx of these new PET findings from 8/2017, KRAS neg, ALK neg , EGFR neg, and ROS-1 neg, here for discussing  and proceeding  with  Cycle #4   no specific complaints  PHYSICAL EXAM:  Wt Readings from Last 3 Encounters:   12/27/17 62.6 kg (138 lb 0.1 oz)   12/04/17 63.5 kg (139 lb 15.9 oz)   11/13/17 64.6 kg (142 lb 6.7 oz)     Temp Readings from Last 3 Encounters:   12/27/17 98.3 °F (36.8 °C) (Oral)   12/04/17 98.3 °F (36.8 °C) (Oral)   11/13/17 98.5 °F (36.9 °C)     BP Readings from Last 3 Encounters:   12/27/17 132/60   12/04/17 (!) 145/65   11/13/17 (!) 120/59     Pulse Readings from Last 3 Encounters:   12/27/17 108   12/04/17 (!) 113   11/13/17 (!) 114     GENERAL:  This is a well-built, well-groomed, comfortable patient without any       deformities ambulating to clinic.  The patient is in no distress.    NEURO:  Awake, alert and oriented to time, person and place.  The patient   demonstrates no evidence of depression, anxiety or agitation.  Patient is   cooperative with exam and discussion.    EYES:  Normal conjunctivae and eyelids.  PERRLA 3 to 4 mm without icterus.    ENT AND MOUTH:  External appearance of ears and nose normal without scars,   lesions or masses.  Nasal mucosa, turbinates and septum are normal.  No ENT   drainage and dried blood noted.  No tenderness over frontal or maxillary sinus.    Lips and gums are normal.  Dentition is normal.    NECK:  Supple.  Trachea is central.  No crepitus.  No JVD.  No thyromegaly or   masses.     RESPIRATORY:  No intercostal retractions and no use of accessory muscles of   respirations noted.  No areas of dullness to percussion.  Chest is clear to   auscultation.  No rales, rhonchi or  wheezes.  No crepitus.  Good air entry   bilaterally.    CARDIOVASCULAR:  No cardiomegaly.  Normal location.  No thrills or heaviness.    Normal carotid pulse.  No bruits.  S1 and S2 are normally heard without murmurs   or gallops.  All peripheral pulses, including pedal pulses, are present.    ABDOMEN:  Normal abdomen.  No hepatosplenomegaly.  No free fluid.  Bowel sounds   are present.  No hernia noted.  No masses.  No rebound or tenderness.  No   guarding or rigidity.   Umbilicus is midline.    LYMPHATICS:  No axillary, cervical, supraclavicular, submental, or inguinal   lymphadenopathy.    SKIN AND MUSCULOSKELETAL:  There is no evidence of excoriation marks or   ecchymosis.  No rashes.  No pigmented lesions, induration or subcutaneous   nodules.  No sores or abnormal moles.  No cyanosis.  No clubbing.  Nailbed   abnormalities are noted. No joint or skeletal deformities noted.  Normal range   of motion.    BREASTS:  No abnormal masses or discharge.    NEUROLOGIC:  Higher functions are appropriate.   No cranial nerve deficits.    Normal gait.  Normal strength.  Motor and sensory functions are normal.  Deep   tendon reflexes are normal without Babinski's sign or ankle clonus.    GENITAL AND RECTAL:  Exams are deferred.    LABORATORY DATA:   Lab Results   Component Value Date    WBC 7.80 12/27/2017    HGB 11.4 (L) 12/27/2017    HCT 34.1 (L) 12/27/2017    MCV 93 12/27/2017     (L) 12/27/2017     CMP  Sodium   Date Value Ref Range Status   12/27/2017 138 136 - 145 mmol/L Final     Potassium   Date Value Ref Range Status   12/27/2017 4.0 3.5 - 5.1 mmol/L Final     Chloride   Date Value Ref Range Status   12/27/2017 104 95 - 110 mmol/L Final     CO2   Date Value Ref Range Status   12/27/2017 24 23 - 29 mmol/L Final     Glucose   Date Value Ref Range Status   12/27/2017 124 (H) 70 - 110 mg/dL Final     BUN, Bld   Date Value Ref Range Status   12/27/2017 9 8 - 23 mg/dL Final     Creatinine   Date Value Ref Range  Status   12/27/2017 0.7 0.5 - 1.4 mg/dL Final     Calcium   Date Value Ref Range Status   12/27/2017 9.4 8.7 - 10.5 mg/dL Final     Total Protein   Date Value Ref Range Status   12/27/2017 7.6 6.0 - 8.4 g/dL Final     Albumin   Date Value Ref Range Status   12/27/2017 3.3 (L) 3.5 - 5.2 g/dL Final     Total Bilirubin   Date Value Ref Range Status   12/27/2017 0.4 0.1 - 1.0 mg/dL Final     Comment:     For infants and newborns, interpretation of results should be based  on gestational age, weight and in agreement with clinical  observations.  Premature Infant recommended reference ranges:  Up to 24 hours.............<8.0 mg/dL  Up to 48 hours............<12.0 mg/dL  3-5 days..................<15.0 mg/dL  6-29 days.................<15.0 mg/dL       Alkaline Phosphatase   Date Value Ref Range Status   12/27/2017 189 (H) 55 - 135 U/L Final     AST   Date Value Ref Range Status   12/27/2017 22 10 - 40 U/L Final     ALT   Date Value Ref Range Status   12/27/2017 15 10 - 44 U/L Final     Anion Gap   Date Value Ref Range Status   12/27/2017 10 8 - 16 mmol/L Final     eGFR if    Date Value Ref Range Status   12/27/2017 >60 >60 mL/min/1.73 m^2 Final     eGFR if non    Date Value Ref Range Status   12/27/2017 >60 >60 mL/min/1.73 m^2 Final     Comment:     Calculation used to obtain the estimated glomerular filtration  rate (eGFR) is the CKD-EPI equation.          IMPRESSION:  1.  Progressive i.e., metastatic squamous cell lung cancer diagnosed November recent PET 12/2017 ashowing stable dz with decrease in suv  2016, treated with radiation, now showing recurrence and new lesions.  EGFR,ALK, ROS-1, PDL2. All negative  2. Cont with chemotherapy carbo/taxol rtc 3 weeks with cbc, cmp  3.  Depression.  Continue medications and followup.  4.  COPD.  Continue her nebulizers and follow with Dr. Mono Duncan.

## 2017-12-28 NOTE — PROGRESS NOTES
Patient, Delicia Murray (MRN #[id number]), presented with a recent Platelet count less than 150 K/uL consistent with the definition of thrombocytopenia (ICD10 - D69.6).    Platelets   Date Value Ref Range Status   12/27/2017 113 (L) 150 - 350 K/uL Final     The patient's thrombocytopenia was monitored, evaluated, addressed and/or treated. This addendum to the medical record is made on 12/28/2017.

## 2018-01-02 DIAGNOSIS — F32.89 OTHER DEPRESSION: ICD-10-CM

## 2018-01-03 ENCOUNTER — TELEPHONE (OUTPATIENT)
Dept: HEMATOLOGY/ONCOLOGY | Facility: CLINIC | Age: 78
End: 2018-01-03

## 2018-01-03 RX ORDER — MIRTAZAPINE 30 MG/1
TABLET, FILM COATED ORAL
Qty: 90 TABLET | Refills: 0 | Status: SHIPPED | OUTPATIENT
Start: 2018-01-03 | End: 2018-03-02 | Stop reason: SDUPTHER

## 2018-01-03 NOTE — TELEPHONE ENCOUNTER
----- Message from Dulce Maria Champagne sent at 1/3/2018  9:31 AM CST -----  Contact: self  Patient called regarding scheduled to receive a shot today, not feeling well wanted to know if she can receive tomorrow. Please contact [phone] (home)

## 2018-01-03 NOTE — TELEPHONE ENCOUNTER
----- Message from Dulce Maria Champagne sent at 1/3/2018 12:15 PM CST -----  Contact: self  Patient called regarding previous message about her shots appt today at 1:40pm. No callback. Please contact [phone] (nufx)

## 2018-01-03 NOTE — TELEPHONE ENCOUNTER
Spoke with patient to inform her that she will need to call infusion to reschedule her injection that she receives after chemo.  Infusion advised patient that she will need to contact Dr. Ceballos's office to make her aware.  Message sent to Letha Sunshine RN Navigator.  Infusion advised patient that Dr. Ceballos's office will need to reschedule her appointment.

## 2018-01-04 DIAGNOSIS — J43.2 CENTRILOBULAR EMPHYSEMA: ICD-10-CM

## 2018-01-05 RX ORDER — PANTOPRAZOLE SODIUM 40 MG/1
TABLET, DELAYED RELEASE ORAL
Qty: 60 TABLET | Refills: 0 | Status: SHIPPED | OUTPATIENT
Start: 2018-01-05 | End: 2018-02-19 | Stop reason: SDUPTHER

## 2018-01-09 DIAGNOSIS — F32.89 OTHER DEPRESSION: ICD-10-CM

## 2018-01-09 RX ORDER — SERTRALINE HYDROCHLORIDE 100 MG/1
TABLET, FILM COATED ORAL
Qty: 90 TABLET | Refills: 0 | Status: SHIPPED | OUTPATIENT
Start: 2018-01-09 | End: 2018-03-02 | Stop reason: SDUPTHER

## 2018-01-20 ENCOUNTER — LAB VISIT (OUTPATIENT)
Dept: LAB | Facility: HOSPITAL | Age: 78
End: 2018-01-20
Attending: INTERNAL MEDICINE
Payer: MEDICARE

## 2018-01-20 DIAGNOSIS — C34.00 MALIGNANT NEOPLASM OF HILUS OF LUNG, UNSPECIFIED LATERALITY: ICD-10-CM

## 2018-01-20 LAB
ALBUMIN SERPL BCP-MCNC: 3.1 G/DL
ALP SERPL-CCNC: 169 U/L
ALT SERPL W/O P-5'-P-CCNC: 6 U/L
ANION GAP SERPL CALC-SCNC: 11 MMOL/L
AST SERPL-CCNC: 12 U/L
BASOPHILS # BLD AUTO: 0 K/UL
BASOPHILS NFR BLD: 0.4 %
BILIRUB SERPL-MCNC: 0.4 MG/DL
BUN SERPL-MCNC: 8 MG/DL
CALCIUM SERPL-MCNC: 9.5 MG/DL
CHLORIDE SERPL-SCNC: 104 MMOL/L
CO2 SERPL-SCNC: 25 MMOL/L
CREAT SERPL-MCNC: 0.7 MG/DL
DIFFERENTIAL METHOD: ABNORMAL
EOSINOPHIL # BLD AUTO: 0.1 K/UL
EOSINOPHIL NFR BLD: 0.9 %
ERYTHROCYTE [DISTWIDTH] IN BLOOD BY AUTOMATED COUNT: 17.8 %
EST. GFR  (AFRICAN AMERICAN): >60 ML/MIN/1.73 M^2
EST. GFR  (NON AFRICAN AMERICAN): >60 ML/MIN/1.73 M^2
GLUCOSE SERPL-MCNC: 115 MG/DL
HCT VFR BLD AUTO: 30.4 %
HGB BLD-MCNC: 10.1 G/DL
LYMPHOCYTES # BLD AUTO: 1.4 K/UL
LYMPHOCYTES NFR BLD: 17.7 %
MCH RBC QN AUTO: 30.9 PG
MCHC RBC AUTO-ENTMCNC: 33.3 G/DL
MCV RBC AUTO: 93 FL
MONOCYTES # BLD AUTO: 0.5 K/UL
MONOCYTES NFR BLD: 5.6 %
NEUTROPHILS # BLD AUTO: 6.1 K/UL
NEUTROPHILS NFR BLD: 75.4 %
PLATELET # BLD AUTO: 89 K/UL
PMV BLD AUTO: 7.2 FL
POTASSIUM SERPL-SCNC: 3.5 MMOL/L
PROT SERPL-MCNC: 8 G/DL
RBC # BLD AUTO: 3.28 M/UL
SODIUM SERPL-SCNC: 140 MMOL/L
WBC # BLD AUTO: 8.1 K/UL

## 2018-01-20 PROCEDURE — 85025 COMPLETE CBC W/AUTO DIFF WBC: CPT

## 2018-01-20 PROCEDURE — 80053 COMPREHEN METABOLIC PANEL: CPT

## 2018-01-20 PROCEDURE — 36415 COLL VENOUS BLD VENIPUNCTURE: CPT

## 2018-01-22 ENCOUNTER — OFFICE VISIT (OUTPATIENT)
Dept: HEMATOLOGY/ONCOLOGY | Facility: CLINIC | Age: 78
End: 2018-01-22
Payer: MEDICARE

## 2018-01-22 VITALS
RESPIRATION RATE: 18 BRPM | WEIGHT: 132.5 LBS | TEMPERATURE: 98 F | DIASTOLIC BLOOD PRESSURE: 56 MMHG | SYSTOLIC BLOOD PRESSURE: 111 MMHG | HEART RATE: 122 BPM | HEIGHT: 62 IN | BODY MASS INDEX: 24.38 KG/M2

## 2018-01-22 DIAGNOSIS — C34.00 MALIGNANT NEOPLASM OF HILUS OF LUNG, UNSPECIFIED LATERALITY: Primary | ICD-10-CM

## 2018-01-22 DIAGNOSIS — C34.11 MALIGNANT NEOPLASM OF UPPER LOBE OF RIGHT LUNG: ICD-10-CM

## 2018-01-22 DIAGNOSIS — D50.0 IRON DEFICIENCY ANEMIA DUE TO CHRONIC BLOOD LOSS: Chronic | ICD-10-CM

## 2018-01-22 DIAGNOSIS — D12.2 BENIGN NEOPLASM OF ASCENDING COLON: ICD-10-CM

## 2018-01-22 DIAGNOSIS — F32.0 CURRENT MILD EPISODE OF MAJOR DEPRESSIVE DISORDER WITHOUT PRIOR EPISODE: ICD-10-CM

## 2018-01-22 PROCEDURE — 99999 PR PBB SHADOW E&M-EST. PATIENT-LVL III: CPT | Mod: PBBFAC,,, | Performed by: INTERNAL MEDICINE

## 2018-01-22 PROCEDURE — 99499 UNLISTED E&M SERVICE: CPT | Mod: S$GLB,,, | Performed by: INTERNAL MEDICINE

## 2018-01-22 PROCEDURE — 99214 OFFICE O/P EST MOD 30 MIN: CPT | Mod: S$GLB,,, | Performed by: INTERNAL MEDICINE

## 2018-01-22 RX ORDER — DIPHENHYDRAMINE HYDROCHLORIDE 50 MG/ML
50 INJECTION INTRAMUSCULAR; INTRAVENOUS ONCE AS NEEDED
Status: CANCELLED | OUTPATIENT
Start: 2018-01-23 | End: 2018-01-23

## 2018-01-22 RX ORDER — SODIUM CHLORIDE 0.9 % (FLUSH) 0.9 %
10 SYRINGE (ML) INJECTION
Status: CANCELLED | OUTPATIENT
Start: 2018-01-23

## 2018-01-22 RX ORDER — FAMOTIDINE 10 MG/ML
20 INJECTION INTRAVENOUS
Status: CANCELLED | OUTPATIENT
Start: 2018-01-23

## 2018-01-22 RX ORDER — EPINEPHRINE 0.3 MG/.3ML
0.3 INJECTION SUBCUTANEOUS ONCE AS NEEDED
Status: CANCELLED | OUTPATIENT
Start: 2018-01-23 | End: 2018-01-23

## 2018-01-22 RX ORDER — HEPARIN 100 UNIT/ML
500 SYRINGE INTRAVENOUS
Status: CANCELLED | OUTPATIENT
Start: 2018-01-23

## 2018-01-22 NOTE — PROGRESS NOTES
A 77-year-old  woman who has been following with me in the past for   iron deficiency anemia also has dx of sq ca of lung that was rx with xrt alone 1/2017, was not a good sx candidate, she has been requiring iron infusion periodically for poor absorption   had repeat bx of these new PET findings from 8/2017, KRAS neg, ALK neg , EGFR neg, and ROS-1 neg, here for discussing  and proceeding  with  Cycle #4  Had a difficult time this rx , weak, tired, couldn't get it togtehers  PHYSICAL EXAM:  Wt Readings from Last 3 Encounters:   01/22/18 60.1 kg (132 lb 7.9 oz)   12/27/17 62.6 kg (138 lb 0.1 oz)   12/04/17 63.5 kg (139 lb 15.9 oz)     Temp Readings from Last 3 Encounters:   01/22/18 98.3 °F (36.8 °C) (Oral)   12/27/17 98.3 °F (36.8 °C) (Oral)   12/04/17 98.3 °F (36.8 °C) (Oral)     BP Readings from Last 3 Encounters:   01/22/18 (!) 111/56   12/27/17 132/60   12/04/17 (!) 145/65     Pulse Readings from Last 3 Encounters:   01/22/18 (!) 122   12/27/17 108   12/04/17 (!) 113     GENERAL:  This is a well-built, well-groomed, comfortable patient without any       deformities ambulating to clinic.  The patient is in no distress.    NEURO:  Awake, alert and oriented to time, person and place.  The patient   demonstrates no evidence of depression, anxiety or agitation.  Patient is   cooperative with exam and discussion.    EYES:  Normal conjunctivae and eyelids.  PERRLA 3 to 4 mm without icterus.    ENT AND MOUTH:  External appearance of ears and nose normal without scars,   lesions or masses.  Nasal mucosa, turbinates and septum are normal.  No ENT   drainage and dried blood noted.  No tenderness over frontal or maxillary sinus.    Lips and gums are normal.  Dentition is normal.    NECK:  Supple.  Trachea is central.  No crepitus.  No JVD.  No thyromegaly or   masses.     RESPIRATORY:  No intercostal retractions and no use of accessory muscles of   respirations noted.  No areas of dullness to percussion.  Chest is  clear to   auscultation.  No rales, rhonchi or wheezes.  No crepitus.  Good air entry   bilaterally.    CARDIOVASCULAR:  No cardiomegaly.  Normal location.  No thrills or heaviness.    Normal carotid pulse.  No bruits.  S1 and S2 are normally heard without murmurs   or gallops.  All peripheral pulses, including pedal pulses, are present.    ABDOMEN:  Normal abdomen.  No hepatosplenomegaly.  No free fluid.  Bowel sounds   are present.  No hernia noted.  No masses.  No rebound or tenderness.  No   guarding or rigidity.   Umbilicus is midline.    LYMPHATICS:  No axillary, cervical, supraclavicular, submental, or inguinal   lymphadenopathy.    SKIN AND MUSCULOSKELETAL:  There is no evidence of excoriation marks or   ecchymosis.  No rashes.  No pigmented lesions, induration or subcutaneous   nodules.  No sores or abnormal moles.  No cyanosis.  No clubbing.  Nailbed   abnormalities are noted. No joint or skeletal deformities noted.  Normal range   of motion.    BREASTS:  No abnormal masses or discharge.    NEUROLOGIC:  Higher functions are appropriate.   No cranial nerve deficits.    Normal gait.  Normal strength.  Motor and sensory functions are normal.  Deep   tendon reflexes are normal without Babinski's sign or ankle clonus.    GENITAL AND RECTAL:  Exams are deferred.    LABORATORY DATA:   Lab Results   Component Value Date    WBC 8.10 01/20/2018    HGB 10.1 (L) 01/20/2018    HCT 30.4 (L) 01/20/2018    MCV 93 01/20/2018    PLT 89 (L) 01/20/2018     CMP  Sodium   Date Value Ref Range Status   01/20/2018 140 136 - 145 mmol/L Final     Potassium   Date Value Ref Range Status   01/20/2018 3.5 3.5 - 5.1 mmol/L Final     Chloride   Date Value Ref Range Status   01/20/2018 104 95 - 110 mmol/L Final     CO2   Date Value Ref Range Status   01/20/2018 25 23 - 29 mmol/L Final     Glucose   Date Value Ref Range Status   01/20/2018 115 (H) 70 - 110 mg/dL Final     BUN, Bld   Date Value Ref Range Status   01/20/2018 8 8 - 23 mg/dL  Final     Creatinine   Date Value Ref Range Status   01/20/2018 0.7 0.5 - 1.4 mg/dL Final     Calcium   Date Value Ref Range Status   01/20/2018 9.5 8.7 - 10.5 mg/dL Final     Total Protein   Date Value Ref Range Status   01/20/2018 8.0 6.0 - 8.4 g/dL Final     Albumin   Date Value Ref Range Status   01/20/2018 3.1 (L) 3.5 - 5.2 g/dL Final     Total Bilirubin   Date Value Ref Range Status   01/20/2018 0.4 0.1 - 1.0 mg/dL Final     Comment:     For infants and newborns, interpretation of results should be based  on gestational age, weight and in agreement with clinical  observations.  Premature Infant recommended reference ranges:  Up to 24 hours.............<8.0 mg/dL  Up to 48 hours............<12.0 mg/dL  3-5 days..................<15.0 mg/dL  6-29 days.................<15.0 mg/dL       Alkaline Phosphatase   Date Value Ref Range Status   01/20/2018 169 (H) 55 - 135 U/L Final     AST   Date Value Ref Range Status   01/20/2018 12 10 - 40 U/L Final     ALT   Date Value Ref Range Status   01/20/2018 6 (L) 10 - 44 U/L Final     Anion Gap   Date Value Ref Range Status   01/20/2018 11 8 - 16 mmol/L Final     eGFR if    Date Value Ref Range Status   01/20/2018 >60 >60 mL/min/1.73 m^2 Final     eGFR if non    Date Value Ref Range Status   01/20/2018 >60 >60 mL/min/1.73 m^2 Final     Comment:     Calculation used to obtain the estimated glomerular filtration  rate (eGFR) is the CKD-EPI equation.          IMPRESSION:  1.  Progressive i.e., metastatic squamous cell lung cancer diagnosed November recent PET 12/2017 ashowing stable dz with decrease in suv  2016, treated with radiation, now showing recurrence and new lesions.  EGFR,ALK, ROS-1, PDL2. All negative  2. Hold  this chemotherapy due to thrombocytopenia and extreme fatigue carbo/taxol maybe next week? rtc 1 week with cbc, cmp  3.  Depression.  Continue medications and followup.  4.  COPD.  Continue her nebulizers and follow with   Mono Duncan.

## 2018-01-25 DIAGNOSIS — C34.00 MALIGNANT NEOPLASM OF HILUS OF LUNG, UNSPECIFIED LATERALITY: ICD-10-CM

## 2018-01-25 RX ORDER — ALPRAZOLAM 0.25 MG/1
TABLET ORAL
Qty: 60 TABLET | Refills: 0 | Status: SHIPPED | OUTPATIENT
Start: 2018-01-25 | End: 2018-03-08 | Stop reason: SDUPTHER

## 2018-01-27 ENCOUNTER — LAB VISIT (OUTPATIENT)
Dept: LAB | Facility: HOSPITAL | Age: 78
End: 2018-01-27
Attending: INTERNAL MEDICINE
Payer: MEDICARE

## 2018-01-27 DIAGNOSIS — C34.00 MALIGNANT NEOPLASM OF HILUS OF LUNG, UNSPECIFIED LATERALITY: ICD-10-CM

## 2018-01-27 LAB
ALBUMIN SERPL BCP-MCNC: 3.2 G/DL
ALP SERPL-CCNC: 157 U/L
ALT SERPL W/O P-5'-P-CCNC: 8 U/L
ANION GAP SERPL CALC-SCNC: 13 MMOL/L
AST SERPL-CCNC: 15 U/L
BASOPHILS # BLD AUTO: 0.2 K/UL
BASOPHILS NFR BLD: 2 %
BILIRUB SERPL-MCNC: 0.4 MG/DL
BUN SERPL-MCNC: 7 MG/DL
CALCIUM SERPL-MCNC: 9.5 MG/DL
CHLORIDE SERPL-SCNC: 103 MMOL/L
CO2 SERPL-SCNC: 26 MMOL/L
CREAT SERPL-MCNC: 0.7 MG/DL
DIFFERENTIAL METHOD: ABNORMAL
EOSINOPHIL # BLD AUTO: 0.1 K/UL
EOSINOPHIL NFR BLD: 1.4 %
ERYTHROCYTE [DISTWIDTH] IN BLOOD BY AUTOMATED COUNT: 18.3 %
EST. GFR  (AFRICAN AMERICAN): >60 ML/MIN/1.73 M^2
EST. GFR  (NON AFRICAN AMERICAN): >60 ML/MIN/1.73 M^2
FERRITIN SERPL-MCNC: 169 NG/ML
GLUCOSE SERPL-MCNC: 110 MG/DL
HCT VFR BLD AUTO: 32.7 %
HGB BLD-MCNC: 10.5 G/DL
IRON SERPL-MCNC: 59 UG/DL
LYMPHOCYTES # BLD AUTO: 0.9 K/UL
LYMPHOCYTES NFR BLD: 10.1 %
MCH RBC QN AUTO: 30.2 PG
MCHC RBC AUTO-ENTMCNC: 32.1 G/DL
MCV RBC AUTO: 94 FL
MONOCYTES # BLD AUTO: 0.8 K/UL
MONOCYTES NFR BLD: 8.2 %
NEUTROPHILS # BLD AUTO: 7.3 K/UL
NEUTROPHILS NFR BLD: 78.3 %
PLATELET # BLD AUTO: 167 K/UL
PLATELET BLD QL SMEAR: ABNORMAL
PMV BLD AUTO: 6.4 FL
POTASSIUM SERPL-SCNC: 3.2 MMOL/L
PROT SERPL-MCNC: 8 G/DL
RBC # BLD AUTO: 3.48 M/UL
SATURATED IRON: 16 %
SODIUM SERPL-SCNC: 142 MMOL/L
TOTAL IRON BINDING CAPACITY: 358 UG/DL
TRANSFERRIN SERPL-MCNC: 242 MG/DL
WBC # BLD AUTO: 9.3 K/UL

## 2018-01-27 PROCEDURE — 82728 ASSAY OF FERRITIN: CPT

## 2018-01-27 PROCEDURE — 83540 ASSAY OF IRON: CPT

## 2018-01-27 PROCEDURE — 80053 COMPREHEN METABOLIC PANEL: CPT

## 2018-01-27 PROCEDURE — 84238 ASSAY NONENDOCRINE RECEPTOR: CPT

## 2018-01-27 PROCEDURE — 36415 COLL VENOUS BLD VENIPUNCTURE: CPT

## 2018-01-27 PROCEDURE — 85025 COMPLETE CBC W/AUTO DIFF WBC: CPT

## 2018-01-29 ENCOUNTER — OFFICE VISIT (OUTPATIENT)
Dept: HEMATOLOGY/ONCOLOGY | Facility: CLINIC | Age: 78
End: 2018-01-29
Payer: MEDICARE

## 2018-01-29 VITALS
SYSTOLIC BLOOD PRESSURE: 126 MMHG | DIASTOLIC BLOOD PRESSURE: 60 MMHG | RESPIRATION RATE: 18 BRPM | BODY MASS INDEX: 24.46 KG/M2 | HEART RATE: 122 BPM | TEMPERATURE: 99 F | WEIGHT: 132.94 LBS | HEIGHT: 62 IN

## 2018-01-29 DIAGNOSIS — C34.00 MALIGNANT NEOPLASM OF HILUS OF LUNG, UNSPECIFIED LATERALITY: Primary | ICD-10-CM

## 2018-01-29 DIAGNOSIS — D63.8 ANEMIA, CHRONIC DISEASE: ICD-10-CM

## 2018-01-29 DIAGNOSIS — J84.10 PULMONARY FIBROSIS: ICD-10-CM

## 2018-01-29 DIAGNOSIS — D12.2 BENIGN NEOPLASM OF ASCENDING COLON: ICD-10-CM

## 2018-01-29 DIAGNOSIS — C34.11 MALIGNANT NEOPLASM OF UPPER LOBE OF RIGHT LUNG: ICD-10-CM

## 2018-01-29 DIAGNOSIS — C34.00 MALIGNANT NEOPLASM OF HILUS OF LUNG, UNSPECIFIED LATERALITY: ICD-10-CM

## 2018-01-29 PROCEDURE — 99999 PR PBB SHADOW E&M-EST. PATIENT-LVL III: CPT | Mod: PBBFAC,,, | Performed by: INTERNAL MEDICINE

## 2018-01-29 PROCEDURE — 99215 OFFICE O/P EST HI 40 MIN: CPT | Mod: S$GLB,,, | Performed by: INTERNAL MEDICINE

## 2018-01-29 PROCEDURE — 99499 UNLISTED E&M SERVICE: CPT | Mod: S$GLB,,, | Performed by: INTERNAL MEDICINE

## 2018-01-29 RX ORDER — HYDROCODONE BITARTRATE AND ACETAMINOPHEN 5; 325 MG/1; MG/1
1 TABLET ORAL EVERY 4 HOURS PRN
Qty: 60 TABLET | Refills: 0 | Status: SHIPPED | OUTPATIENT
Start: 2018-01-29 | End: 2018-04-02 | Stop reason: SDUPTHER

## 2018-01-29 NOTE — PROGRESS NOTES
A 77-year-old  woman who has been following with me in the past for   iron deficiency anemia also has dx of sq ca of lung that was rx with xrt alone 1/2017, was not a good sx candidate, she has been requiring iron infusion periodically for poor absorption   had repeat bx of these new PET findings from 8/2017, KRAS neg, ALK neg , EGFR neg, and ROS-1 neg, here for discussing  and proceeding  with  Cycle #4  Had a difficult time this rx , weak, tired, couldn't get it togtehers  PHYSICAL EXAM:  Wt Readings from Last 3 Encounters:   01/29/18 60.3 kg (132 lb 15 oz)   01/22/18 60.1 kg (132 lb 7.9 oz)   12/27/17 62.6 kg (138 lb 0.1 oz)     Temp Readings from Last 3 Encounters:   01/29/18 98.6 °F (37 °C)   01/22/18 98.3 °F (36.8 °C) (Oral)   12/27/17 98.3 °F (36.8 °C) (Oral)     BP Readings from Last 3 Encounters:   01/29/18 126/60   01/22/18 (!) 111/56   12/27/17 132/60     Pulse Readings from Last 3 Encounters:   01/29/18 (!) 122   01/22/18 (!) 122   12/27/17 108     GENERAL:  This is a well-built, well-groomed, comfortable patient without any       deformities ambulating to clinic.  The patient is in no distress.    NEURO:  Awake, alert and oriented to time, person and place.  The patient   demonstrates no evidence of depression, anxiety or agitation.  Patient is   cooperative with exam and discussion.    EYES:  Normal conjunctivae and eyelids.  PERRLA 3 to 4 mm without icterus.    ENT AND MOUTH:  External appearance of ears and nose normal without scars,   lesions or masses.  Nasal mucosa, turbinates and septum are normal.  No ENT   drainage and dried blood noted.  No tenderness over frontal or maxillary sinus.    Lips and gums are normal.  Dentition is normal.    NECK:  Supple.  Trachea is central.  No crepitus.  No JVD.  No thyromegaly or   masses.     RESPIRATORY:  No intercostal retractions and no use of accessory muscles of   respirations noted.  No areas of dullness to percussion.  Chest is clear to    auscultation.  No rales, rhonchi or wheezes.  No crepitus.  Good air entry   bilaterally.    CARDIOVASCULAR:  No cardiomegaly.  Normal location.  No thrills or heaviness.    Normal carotid pulse.  No bruits.  S1 and S2 are normally heard without murmurs   or gallops.  All peripheral pulses, including pedal pulses, are present.    ABDOMEN:  Normal abdomen.  No hepatosplenomegaly.  No free fluid.  Bowel sounds   are present.  No hernia noted.  No masses.  No rebound or tenderness.  No   guarding or rigidity.   Umbilicus is midline.    LYMPHATICS:  No axillary, cervical, supraclavicular, submental, or inguinal   lymphadenopathy.    SKIN AND MUSCULOSKELETAL:  There is no evidence of excoriation marks or   ecchymosis.  No rashes.  No pigmented lesions, induration or subcutaneous   nodules.  No sores or abnormal moles.  No cyanosis.  No clubbing.  Nailbed   abnormalities are noted. No joint or skeletal deformities noted.  Normal range   of motion.    BREASTS:  No abnormal masses or discharge.    NEUROLOGIC:  Higher functions are appropriate.   No cranial nerve deficits.    Normal gait.  Normal strength.  Motor and sensory functions are normal.  Deep   tendon reflexes are normal without Babinski's sign or ankle clonus.    GENITAL AND RECTAL:  Exams are deferred.    LABORATORY DATA:   Lab Results   Component Value Date    WBC 9.30 01/27/2018    HGB 10.5 (L) 01/27/2018    HCT 32.7 (L) 01/27/2018    MCV 94 01/27/2018     01/27/2018     CMP  Sodium   Date Value Ref Range Status   01/27/2018 142 136 - 145 mmol/L Final     Potassium   Date Value Ref Range Status   01/27/2018 3.2 (L) 3.5 - 5.1 mmol/L Final     Chloride   Date Value Ref Range Status   01/27/2018 103 95 - 110 mmol/L Final     CO2   Date Value Ref Range Status   01/27/2018 26 23 - 29 mmol/L Final     Glucose   Date Value Ref Range Status   01/27/2018 110 70 - 110 mg/dL Final     BUN, Bld   Date Value Ref Range Status   01/27/2018 7 (L) 8 - 23 mg/dL Final      Creatinine   Date Value Ref Range Status   01/27/2018 0.7 0.5 - 1.4 mg/dL Final     Calcium   Date Value Ref Range Status   01/27/2018 9.5 8.7 - 10.5 mg/dL Final     Total Protein   Date Value Ref Range Status   01/27/2018 8.0 6.0 - 8.4 g/dL Final     Albumin   Date Value Ref Range Status   01/27/2018 3.2 (L) 3.5 - 5.2 g/dL Final     Total Bilirubin   Date Value Ref Range Status   01/27/2018 0.4 0.1 - 1.0 mg/dL Final     Comment:     For infants and newborns, interpretation of results should be based  on gestational age, weight and in agreement with clinical  observations.  Premature Infant recommended reference ranges:  Up to 24 hours.............<8.0 mg/dL  Up to 48 hours............<12.0 mg/dL  3-5 days..................<15.0 mg/dL  6-29 days.................<15.0 mg/dL       Alkaline Phosphatase   Date Value Ref Range Status   01/27/2018 157 (H) 55 - 135 U/L Final     AST   Date Value Ref Range Status   01/27/2018 15 10 - 40 U/L Final     ALT   Date Value Ref Range Status   01/27/2018 8 (L) 10 - 44 U/L Final     Anion Gap   Date Value Ref Range Status   01/27/2018 13 8 - 16 mmol/L Final     eGFR if    Date Value Ref Range Status   01/27/2018 >60 >60 mL/min/1.73 m^2 Final     eGFR if non    Date Value Ref Range Status   01/27/2018 >60 >60 mL/min/1.73 m^2 Final     Comment:     Calculation used to obtain the estimated glomerular filtration  rate (eGFR) is the CKD-EPI equation.          IMPRESSION:  1.  Progressive i.e., metastatic squamous cell lung cancer diagnosed November recent PET 12/2017 ashowing stable dz with decrease in suv    EGFR,ALK, ROS-1, PDL2. All negative  2. Proceed with  this chemotherapy  Thrombocytopenia has resolved fatigue is better rtc 3 weeks with cbc, cmp  3.  Depression.  Continue medications and followup.  4.  COPD.  Continue her nebulizers and follow with Dr. Mono Duncan.   this is cycle #5 after 6th will recan

## 2018-01-30 LAB — STFR SERPL-MCNC: 4.6 MG/L

## 2018-02-17 ENCOUNTER — LAB VISIT (OUTPATIENT)
Dept: LAB | Facility: HOSPITAL | Age: 78
End: 2018-02-17
Attending: INTERNAL MEDICINE
Payer: MEDICARE

## 2018-02-17 DIAGNOSIS — C34.00 MALIGNANT NEOPLASM OF HILUS OF LUNG, UNSPECIFIED LATERALITY: ICD-10-CM

## 2018-02-17 LAB
ALBUMIN SERPL BCP-MCNC: 3 G/DL
ALP SERPL-CCNC: 199 U/L
ALT SERPL W/O P-5'-P-CCNC: 8 U/L
ANION GAP SERPL CALC-SCNC: 10 MMOL/L
AST SERPL-CCNC: 12 U/L
BASOPHILS # BLD AUTO: 0 K/UL
BASOPHILS NFR BLD: 0.2 %
BILIRUB SERPL-MCNC: 0.4 MG/DL
BUN SERPL-MCNC: 7 MG/DL
CALCIUM SERPL-MCNC: 9.1 MG/DL
CHLORIDE SERPL-SCNC: 105 MMOL/L
CO2 SERPL-SCNC: 27 MMOL/L
CREAT SERPL-MCNC: 0.6 MG/DL
DIFFERENTIAL METHOD: ABNORMAL
EOSINOPHIL # BLD AUTO: 0.1 K/UL
EOSINOPHIL NFR BLD: 1.3 %
ERYTHROCYTE [DISTWIDTH] IN BLOOD BY AUTOMATED COUNT: 18.6 %
EST. GFR  (AFRICAN AMERICAN): >60 ML/MIN/1.73 M^2
EST. GFR  (NON AFRICAN AMERICAN): >60 ML/MIN/1.73 M^2
GLUCOSE SERPL-MCNC: 108 MG/DL
HCT VFR BLD AUTO: 32.7 %
HGB BLD-MCNC: 10.5 G/DL
LYMPHOCYTES # BLD AUTO: 0.9 K/UL
LYMPHOCYTES NFR BLD: 10.1 %
MCH RBC QN AUTO: 30.1 PG
MCHC RBC AUTO-ENTMCNC: 32.1 G/DL
MCV RBC AUTO: 94 FL
MONOCYTES # BLD AUTO: 0.7 K/UL
MONOCYTES NFR BLD: 7.8 %
NEUTROPHILS # BLD AUTO: 7.1 K/UL
NEUTROPHILS NFR BLD: 80.6 %
PLATELET # BLD AUTO: 113 K/UL
PMV BLD AUTO: 7.9 FL
POTASSIUM SERPL-SCNC: 3.4 MMOL/L
PROT SERPL-MCNC: 7.5 G/DL
RBC # BLD AUTO: 3.48 M/UL
SODIUM SERPL-SCNC: 142 MMOL/L
WBC # BLD AUTO: 8.8 K/UL

## 2018-02-17 PROCEDURE — 80053 COMPREHEN METABOLIC PANEL: CPT

## 2018-02-17 PROCEDURE — 85025 COMPLETE CBC W/AUTO DIFF WBC: CPT

## 2018-02-17 PROCEDURE — 36415 COLL VENOUS BLD VENIPUNCTURE: CPT

## 2018-02-19 ENCOUNTER — OFFICE VISIT (OUTPATIENT)
Dept: HEMATOLOGY/ONCOLOGY | Facility: CLINIC | Age: 78
End: 2018-02-19
Payer: MEDICARE

## 2018-02-19 VITALS
WEIGHT: 131.81 LBS | BODY MASS INDEX: 24.26 KG/M2 | RESPIRATION RATE: 20 BRPM | SYSTOLIC BLOOD PRESSURE: 139 MMHG | DIASTOLIC BLOOD PRESSURE: 65 MMHG | HEIGHT: 62 IN | TEMPERATURE: 98 F | HEART RATE: 106 BPM

## 2018-02-19 DIAGNOSIS — D50.0 IRON DEFICIENCY ANEMIA DUE TO CHRONIC BLOOD LOSS: Chronic | ICD-10-CM

## 2018-02-19 DIAGNOSIS — D12.2 BENIGN NEOPLASM OF ASCENDING COLON: Primary | ICD-10-CM

## 2018-02-19 DIAGNOSIS — C34.11 MALIGNANT NEOPLASM OF UPPER LOBE OF RIGHT LUNG: ICD-10-CM

## 2018-02-19 DIAGNOSIS — C34.90 MALIGNANT NEOPLASM OF LUNG, UNSPECIFIED LATERALITY, UNSPECIFIED PART OF LUNG: Primary | ICD-10-CM

## 2018-02-19 DIAGNOSIS — D70.1 CHEMOTHERAPY INDUCED NEUTROPENIA: ICD-10-CM

## 2018-02-19 DIAGNOSIS — T45.1X5A CHEMOTHERAPY INDUCED NEUTROPENIA: ICD-10-CM

## 2018-02-19 PROCEDURE — 99215 OFFICE O/P EST HI 40 MIN: CPT | Mod: S$GLB,,, | Performed by: INTERNAL MEDICINE

## 2018-02-19 PROCEDURE — 1126F AMNT PAIN NOTED NONE PRSNT: CPT | Mod: S$GLB,,, | Performed by: INTERNAL MEDICINE

## 2018-02-19 PROCEDURE — 3008F BODY MASS INDEX DOCD: CPT | Mod: S$GLB,,, | Performed by: INTERNAL MEDICINE

## 2018-02-19 PROCEDURE — 99499 UNLISTED E&M SERVICE: CPT | Mod: S$GLB,,, | Performed by: INTERNAL MEDICINE

## 2018-02-19 PROCEDURE — 1159F MED LIST DOCD IN RCRD: CPT | Mod: S$GLB,,, | Performed by: INTERNAL MEDICINE

## 2018-02-19 PROCEDURE — 99999 PR PBB SHADOW E&M-EST. PATIENT-LVL III: CPT | Mod: PBBFAC,,, | Performed by: INTERNAL MEDICINE

## 2018-02-19 NOTE — PROGRESS NOTES
A 77-year-old  woman who has been following with me in the past for   iron deficiency anemia also has dx of sq ca of lung that was rx with xrt alone 1/2017, was not a good sx candidate, she has been requiring iron infusion periodically for poor absorption   had repeat bx of these new PET findings from 8/2017, KRAS neg, ALK neg , EGFR neg, and ROS-1 neg, here for discussing  and proceeding  with  Cycle #4  Had a difficult time this rx , weak, tired, couldn't get it together, feeling worse and thinks it may be from iron being low again, but her iron was checked 3 weeks ago, wants a rx break. Her 6th bday is coming and her sisters are vsiiing  PHYSICAL EXAM:  Wt Readings from Last 3 Encounters:   02/19/18 59.8 kg (131 lb 13.4 oz)   01/29/18 60.3 kg (132 lb 15 oz)   01/22/18 60.1 kg (132 lb 7.9 oz)     Temp Readings from Last 3 Encounters:   02/19/18 98.2 °F (36.8 °C) (Oral)   01/29/18 98.6 °F (37 °C)   01/22/18 98.3 °F (36.8 °C) (Oral)     BP Readings from Last 3 Encounters:   02/19/18 139/65   01/29/18 126/60   01/22/18 (!) 111/56     Pulse Readings from Last 3 Encounters:   02/19/18 106   01/29/18 (!) 122   01/22/18 (!) 122     GENERAL:  This is a well-built, well-groomed, comfortable patient without any       deformities ambulating to clinic.  The patient is in no distress.    NEURO:  Awake, alert and oriented to time, person and place.  The patient   demonstrates no evidence of depression, anxiety or agitation.  Patient is   cooperative with exam and discussion.    EYES:  Normal conjunctivae and eyelids.  PERRLA 3 to 4 mm without icterus.    ENT AND MOUTH:  External appearance of ears and nose normal without scars,   lesions or masses.  Nasal mucosa, turbinates and septum are normal.  No ENT   drainage and dried blood noted.  No tenderness over frontal or maxillary sinus.    Lips and gums are normal.  Dentition is normal.    NECK:  Supple.  Trachea is central.  No crepitus.  No JVD.  No thyromegaly or    masses.     RESPIRATORY:  No intercostal retractions and no use of accessory muscles of   respirations noted.  No areas of dullness to percussion.  Chest is clear to   auscultation.  No rales, rhonchi or wheezes.  No crepitus.  Good air entry   bilaterally.    CARDIOVASCULAR:  No cardiomegaly.  Normal location.  No thrills or heaviness.    Normal carotid pulse.  No bruits.  S1 and S2 are normally heard without murmurs   or gallops.  All peripheral pulses, including pedal pulses, are present.    ABDOMEN:  Normal abdomen.  No hepatosplenomegaly.  No free fluid.  Bowel sounds   are present.  No hernia noted.  No masses.  No rebound or tenderness.  No   guarding or rigidity.   Umbilicus is midline.    LYMPHATICS:  No axillary, cervical, supraclavicular, submental, or inguinal   lymphadenopathy.    SKIN AND MUSCULOSKELETAL:  There is no evidence of excoriation marks or   ecchymosis.  No rashes.  No pigmented lesions, induration or subcutaneous   nodules.  No sores or abnormal moles.  No cyanosis.  No clubbing.  Nailbed   abnormalities are noted. No joint or skeletal deformities noted.  Normal range   of motion.    BREASTS:  No abnormal masses or discharge.    NEUROLOGIC:  Higher functions are appropriate.   No cranial nerve deficits.    Normal gait.  Normal strength.  Motor and sensory functions are normal.  Deep   tendon reflexes are normal without Babinski's sign or ankle clonus.    GENITAL AND RECTAL:  Exams are deferred.    LABORATORY DATA:   Lab Results   Component Value Date    WBC 8.80 02/17/2018    HGB 10.5 (L) 02/17/2018    HCT 32.7 (L) 02/17/2018    MCV 94 02/17/2018     (L) 02/17/2018     CMP  Sodium   Date Value Ref Range Status   02/17/2018 142 136 - 145 mmol/L Final     Potassium   Date Value Ref Range Status   02/17/2018 3.4 (L) 3.5 - 5.1 mmol/L Final     Chloride   Date Value Ref Range Status   02/17/2018 105 95 - 110 mmol/L Final     CO2   Date Value Ref Range Status   02/17/2018 27 23 - 29  mmol/L Final     Glucose   Date Value Ref Range Status   02/17/2018 108 70 - 110 mg/dL Final     BUN, Bld   Date Value Ref Range Status   02/17/2018 7 (L) 8 - 23 mg/dL Final     Creatinine   Date Value Ref Range Status   02/17/2018 0.6 0.5 - 1.4 mg/dL Final     Calcium   Date Value Ref Range Status   02/17/2018 9.1 8.7 - 10.5 mg/dL Final     Total Protein   Date Value Ref Range Status   02/17/2018 7.5 6.0 - 8.4 g/dL Final     Albumin   Date Value Ref Range Status   02/17/2018 3.0 (L) 3.5 - 5.2 g/dL Final     Total Bilirubin   Date Value Ref Range Status   02/17/2018 0.4 0.1 - 1.0 mg/dL Final     Comment:     For infants and newborns, interpretation of results should be based  on gestational age, weight and in agreement with clinical  observations.  Premature Infant recommended reference ranges:  Up to 24 hours.............<8.0 mg/dL  Up to 48 hours............<12.0 mg/dL  3-5 days..................<15.0 mg/dL  6-29 days.................<15.0 mg/dL       Alkaline Phosphatase   Date Value Ref Range Status   02/17/2018 199 (H) 55 - 135 U/L Final     AST   Date Value Ref Range Status   02/17/2018 12 10 - 40 U/L Final     ALT   Date Value Ref Range Status   02/17/2018 8 (L) 10 - 44 U/L Final     Anion Gap   Date Value Ref Range Status   02/17/2018 10 8 - 16 mmol/L Final     eGFR if    Date Value Ref Range Status   02/17/2018 >60 >60 mL/min/1.73 m^2 Final     eGFR if non    Date Value Ref Range Status   02/17/2018 >60 >60 mL/min/1.73 m^2 Final     Comment:     Calculation used to obtain the estimated glomerular filtration  rate (eGFR) is the CKD-EPI equation.          IMPRESSION:  1.  Progressive i.e., metastatic squamous cell lung cancer diagnosed November recent PET 12/2017 ashowing stable dz with decrease in suv    EGFR,ALK, ROS-1, PDL2. All negative  2. Give pt a rx break due to s/e and physical and emotional fatigue   rtc  3/11 after her bday celebrations for rx on 3/12   and on  schedule to get rx  3.  Depression.  Continue medications and followup.  4.  COPD.  Continue her nebulizers and follow with Dr. Mono Duncan.

## 2018-03-02 ENCOUNTER — DOCUMENTATION ONLY (OUTPATIENT)
Dept: FAMILY MEDICINE | Facility: CLINIC | Age: 78
End: 2018-03-02

## 2018-03-02 ENCOUNTER — OFFICE VISIT (OUTPATIENT)
Dept: FAMILY MEDICINE | Facility: CLINIC | Age: 78
End: 2018-03-02
Payer: MEDICARE

## 2018-03-02 VITALS
HEIGHT: 61 IN | OXYGEN SATURATION: 94 % | DIASTOLIC BLOOD PRESSURE: 72 MMHG | HEART RATE: 118 BPM | SYSTOLIC BLOOD PRESSURE: 116 MMHG | WEIGHT: 127.88 LBS | TEMPERATURE: 98 F | BODY MASS INDEX: 24.15 KG/M2

## 2018-03-02 DIAGNOSIS — F32.89 OTHER DEPRESSION: ICD-10-CM

## 2018-03-02 DIAGNOSIS — K21.9 GASTROESOPHAGEAL REFLUX DISEASE, ESOPHAGITIS PRESENCE NOT SPECIFIED: ICD-10-CM

## 2018-03-02 DIAGNOSIS — J96.21 ACUTE ON CHRONIC RESPIRATORY FAILURE WITH HYPOXEMIA: ICD-10-CM

## 2018-03-02 DIAGNOSIS — J44.9 COPD MIXED TYPE: Primary | ICD-10-CM

## 2018-03-02 PROCEDURE — 99499 UNLISTED E&M SERVICE: CPT | Mod: S$GLB,,, | Performed by: FAMILY MEDICINE

## 2018-03-02 PROCEDURE — 99214 OFFICE O/P EST MOD 30 MIN: CPT | Mod: S$GLB,,, | Performed by: FAMILY MEDICINE

## 2018-03-02 PROCEDURE — 99999 PR PBB SHADOW E&M-EST. PATIENT-LVL III: CPT | Mod: PBBFAC,,, | Performed by: FAMILY MEDICINE

## 2018-03-02 RX ORDER — MIRTAZAPINE 30 MG/1
TABLET, FILM COATED ORAL
Qty: 90 TABLET | Refills: 3 | Status: SHIPPED | OUTPATIENT
Start: 2018-03-02

## 2018-03-02 RX ORDER — SERTRALINE HYDROCHLORIDE 100 MG/1
100 TABLET, FILM COATED ORAL NIGHTLY
Qty: 90 TABLET | Refills: 3 | Status: SHIPPED | OUTPATIENT
Start: 2018-03-02

## 2018-03-02 NOTE — PROGRESS NOTES
Pre-Visit Chart Review  For Appointment Scheduled on 03/02/2018    Health Maintenance Due   Topic Date Due    Zoster Vaccine  03/03/1998    Pneumococcal (65+) (1 of 2 - PCV13) 03/03/2003    DEXA SCAN  11/01/2016    Influenza Vaccine  08/01/2017

## 2018-03-02 NOTE — PROGRESS NOTES
Subjective:       Patient ID: Delicia Murray is a 77 y.o. female.    Chief Complaint: Shortness of Breath and Peripheral Neuropathy    Shortness of Breath   This is a recurrent problem. The current episode started more than 1 year ago. The problem occurs constantly. The problem has been gradually worsening. Associated symptoms include claudication, orthopnea, PND and rhinorrhea. Pertinent negatives include no abdominal pain, chest pain, coryza, ear pain, headaches, hemoptysis, leg pain, leg swelling or neck pain. The symptoms are aggravated by exercise, weather changes, pollens and eating. She has tried OTC inhalers and prescription cough suppressants for the symptoms. The treatment provided no relief. Her past medical history is significant for chronic lung disease and COPD.     Review of Systems   Constitutional: Negative for fatigue and unexpected weight change.   HENT: Positive for rhinorrhea. Negative for ear pain.    Respiratory: Positive for shortness of breath. Negative for hemoptysis and chest tightness.    Cardiovascular: Positive for orthopnea, claudication and PND. Negative for chest pain, palpitations and leg swelling.   Gastrointestinal: Negative for abdominal pain.   Musculoskeletal: Negative for arthralgias and neck pain.   Neurological: Negative for dizziness, syncope, light-headedness and headaches.   Psychiatric/Behavioral: Positive for behavioral problems and decreased concentration. Negative for agitation.       Patient Active Problem List   Diagnosis    Anemia requiring transfusions    Depression    Insomnia    Colon polyp    Benign neoplasm of colon    GERD (gastroesophageal reflux disease)    Hypokalemia    Iron deficiency    SARAH (iron deficiency anemia)    Anemia, chronic disease    Emphysema lung    Pulmonary fibrosis    Malignant neoplasm of upper lobe of right lung    COPD mixed type    Bronchiectasis without complication    Chemotherapy induced neutropenia    Peripheral  venous insufficiency       Objective:      Physical Exam   Constitutional: She is oriented to person, place, and time. She appears well-developed and well-nourished.   Cardiovascular: Normal rate, regular rhythm and normal heart sounds.    Pulmonary/Chest: Tachypnea noted. She is in respiratory distress. She has decreased breath sounds in the right lower field and the left lower field. She has wheezes in the right lower field.   Musculoskeletal: She exhibits no edema.   Neurological: She is alert and oriented to person, place, and time.   Skin: Skin is warm and dry.   Psychiatric: Her speech is normal and behavior is normal. Thought content normal. Cognition and memory are impaired. She exhibits a depressed mood.   Nursing note and vitals reviewed.      Lab Results   Component Value Date    WBC 8.80 02/17/2018    HGB 10.5 (L) 02/17/2018    HCT 32.7 (L) 02/17/2018     (L) 02/17/2018    CHOL 167 03/30/2015    TRIG 116 03/30/2015    HDL 28 (L) 03/30/2015    ALT 8 (L) 02/17/2018    AST 12 02/17/2018     02/17/2018    K 3.4 (L) 02/17/2018     02/17/2018    CREATININE 0.6 02/17/2018    BUN 7 (L) 02/17/2018    CO2 27 02/17/2018    TSH 2.608 03/30/2015    INR 0.9 08/28/2017    HGBA1C 5.7 10/07/2015     The 10-year ASCVD risk score ([place] ROSE Jr., et al., 2013) is: 18.4%    Values used to calculate the score:      Age: 77 years      Sex: Female      Is Non- : No      Diabetic: No      Tobacco smoker: No      Systolic Blood Pressure: 116 mmHg      Is BP treated: No      HDL Cholesterol: 28 mg/dL      Total Cholesterol: 167 mg/dL    Assessment:       1. COPD mixed type    2. Other depression    3. Gastroesophageal reflux disease, esophagitis presence not specified        Plan:       COPD mixed type    Other depression  -     sertraline (ZOLOFT) 100 MG tablet; Take 1 tablet (100 mg total) by mouth every evening.  Dispense: 90 tablet; Refill: 3  -     mirtazapine (REMERON) 30 MG tablet;  TAKE 1 TABLET(30 MG) BY MOUTH EVERY EVENING  Dispense: 90 tablet; Refill: 3    Gastroesophageal reflux disease, esophagitis presence not specified      PBarriers to medications present (no )    Adverse reactions to current medications (no)    Over the counter medications reviewed (Yes)      SARAH  Under chemotherapy  This has been fully explained to the patient, who indicates understanding.

## 2018-03-08 DIAGNOSIS — C34.00 MALIGNANT NEOPLASM OF HILUS OF LUNG, UNSPECIFIED LATERALITY: ICD-10-CM

## 2018-03-08 DIAGNOSIS — F32.89 OTHER DEPRESSION: ICD-10-CM

## 2018-03-08 RX ORDER — ALPRAZOLAM 0.25 MG/1
TABLET ORAL
Qty: 60 TABLET | Refills: 0 | Status: SHIPPED | OUTPATIENT
Start: 2018-03-08

## 2018-03-10 ENCOUNTER — LAB VISIT (OUTPATIENT)
Dept: LAB | Facility: HOSPITAL | Age: 78
End: 2018-03-10
Attending: INTERNAL MEDICINE
Payer: MEDICARE

## 2018-03-10 DIAGNOSIS — C34.90 MALIGNANT NEOPLASM OF LUNG, UNSPECIFIED LATERALITY, UNSPECIFIED PART OF LUNG: ICD-10-CM

## 2018-03-10 LAB
ALBUMIN SERPL BCP-MCNC: 3.1 G/DL
ALP SERPL-CCNC: 187 U/L
ALT SERPL W/O P-5'-P-CCNC: 11 U/L
ANION GAP SERPL CALC-SCNC: 11 MMOL/L
AST SERPL-CCNC: 18 U/L
BASOPHILS # BLD AUTO: 0 K/UL
BASOPHILS NFR BLD: 0 %
BILIRUB SERPL-MCNC: 0.6 MG/DL
BUN SERPL-MCNC: 7 MG/DL
CALCIUM SERPL-MCNC: 9.6 MG/DL
CHLORIDE SERPL-SCNC: 101 MMOL/L
CO2 SERPL-SCNC: 26 MMOL/L
CREAT SERPL-MCNC: 0.7 MG/DL
DIFFERENTIAL METHOD: ABNORMAL
EOSINOPHIL # BLD AUTO: 0.3 K/UL
EOSINOPHIL NFR BLD: 3.5 %
ERYTHROCYTE [DISTWIDTH] IN BLOOD BY AUTOMATED COUNT: 17 %
EST. GFR  (AFRICAN AMERICAN): >60 ML/MIN/1.73 M^2
EST. GFR  (NON AFRICAN AMERICAN): >60 ML/MIN/1.73 M^2
GLUCOSE SERPL-MCNC: 117 MG/DL
HCT VFR BLD AUTO: 34.7 %
HGB BLD-MCNC: 11.3 G/DL
LYMPHOCYTES # BLD AUTO: 2 K/UL
LYMPHOCYTES NFR BLD: 22.9 %
MCH RBC QN AUTO: 30 PG
MCHC RBC AUTO-ENTMCNC: 32.6 G/DL
MCV RBC AUTO: 92 FL
MONOCYTES # BLD AUTO: 0.5 K/UL
MONOCYTES NFR BLD: 6 %
NEUTROPHILS # BLD AUTO: 6 K/UL
NEUTROPHILS NFR BLD: 67.6 %
PLATELET # BLD AUTO: 141 K/UL
PMV BLD AUTO: 7.1 FL
POTASSIUM SERPL-SCNC: 3.8 MMOL/L
PROT SERPL-MCNC: 7.9 G/DL
RBC # BLD AUTO: 3.77 M/UL
SODIUM SERPL-SCNC: 138 MMOL/L
WBC # BLD AUTO: 8.8 K/UL

## 2018-03-10 PROCEDURE — 36415 COLL VENOUS BLD VENIPUNCTURE: CPT

## 2018-03-10 PROCEDURE — 80053 COMPREHEN METABOLIC PANEL: CPT

## 2018-03-10 PROCEDURE — 85025 COMPLETE CBC W/AUTO DIFF WBC: CPT

## 2018-03-11 RX ORDER — MIRTAZAPINE 15 MG/1
TABLET, FILM COATED ORAL
Qty: 30 TABLET | Refills: 3 | Status: SHIPPED | OUTPATIENT
Start: 2018-03-11 | End: 2018-03-12

## 2018-03-12 ENCOUNTER — OFFICE VISIT (OUTPATIENT)
Dept: HEMATOLOGY/ONCOLOGY | Facility: CLINIC | Age: 78
End: 2018-03-12
Payer: MEDICARE

## 2018-03-12 VITALS
SYSTOLIC BLOOD PRESSURE: 136 MMHG | RESPIRATION RATE: 20 BRPM | DIASTOLIC BLOOD PRESSURE: 70 MMHG | TEMPERATURE: 98 F | HEIGHT: 62 IN | WEIGHT: 129.88 LBS | BODY MASS INDEX: 23.9 KG/M2 | HEART RATE: 120 BPM

## 2018-03-12 DIAGNOSIS — C34.11 MALIGNANT NEOPLASM OF UPPER LOBE OF RIGHT LUNG: ICD-10-CM

## 2018-03-12 DIAGNOSIS — D50.0 IRON DEFICIENCY ANEMIA DUE TO CHRONIC BLOOD LOSS: Chronic | ICD-10-CM

## 2018-03-12 DIAGNOSIS — J84.10 PULMONARY FIBROSIS: ICD-10-CM

## 2018-03-12 DIAGNOSIS — C34.00 MALIGNANT NEOPLASM OF HILUS OF LUNG, UNSPECIFIED LATERALITY: Primary | ICD-10-CM

## 2018-03-12 PROCEDURE — 99999 PR PBB SHADOW E&M-EST. PATIENT-LVL III: CPT | Mod: PBBFAC,,, | Performed by: INTERNAL MEDICINE

## 2018-03-12 PROCEDURE — 99499 UNLISTED E&M SERVICE: CPT | Mod: S$GLB,,, | Performed by: INTERNAL MEDICINE

## 2018-03-12 PROCEDURE — 99215 OFFICE O/P EST HI 40 MIN: CPT | Mod: S$GLB,,, | Performed by: INTERNAL MEDICINE

## 2018-03-12 RX ORDER — FAMOTIDINE 10 MG/ML
20 INJECTION INTRAVENOUS
Status: CANCELLED | OUTPATIENT
Start: 2018-03-13

## 2018-03-12 RX ORDER — DIPHENHYDRAMINE HYDROCHLORIDE 50 MG/ML
50 INJECTION INTRAMUSCULAR; INTRAVENOUS ONCE AS NEEDED
Status: CANCELLED | OUTPATIENT
Start: 2018-03-13 | End: 2018-03-13

## 2018-03-12 RX ORDER — EPINEPHRINE 0.3 MG/.3ML
0.3 INJECTION SUBCUTANEOUS ONCE AS NEEDED
Status: CANCELLED | OUTPATIENT
Start: 2018-03-13 | End: 2018-03-13

## 2018-03-12 RX ORDER — SODIUM CHLORIDE 0.9 % (FLUSH) 0.9 %
10 SYRINGE (ML) INJECTION
Status: CANCELLED | OUTPATIENT
Start: 2018-03-13

## 2018-03-12 RX ORDER — HEPARIN 100 UNIT/ML
500 SYRINGE INTRAVENOUS
Status: CANCELLED | OUTPATIENT
Start: 2018-03-13

## 2018-03-12 NOTE — PROGRESS NOTES
A 77-year-old  woman who has been following with me in the past for   iron deficiency anemia also has dx of sq ca of lung that was rx with xrt alone 1/2017, was not a good sx candidate, she has been requiring iron infusion periodically for poor absorption   had repeat bx of these new PET findings from 8/2017, KRAS neg, ALK neg , EGFR neg, and ROS-1 neg, here for discussing  and proceeding  with  Cycle #4, she is 5 weeks behind due to a rx break has home o2    Had 78th bday is coming and her sisters visited she feels a lot better and ready to get going again  PHYSICAL EXAM:  Wt Readings from Last 3 Encounters:   03/12/18 58.9 kg (129 lb 13.6 oz)   03/02/18 58 kg (127 lb 13.9 oz)   02/19/18 59.8 kg (131 lb 13.4 oz)     Temp Readings from Last 3 Encounters:   03/12/18 97.6 °F (36.4 °C) (Oral)   03/02/18 98.4 °F (36.9 °C) (Oral)   02/19/18 98.2 °F (36.8 °C) (Oral)     BP Readings from Last 3 Encounters:   03/12/18 136/70   03/02/18 116/72   02/19/18 139/65     Pulse Readings from Last 3 Encounters:   03/12/18 (!) 120   03/02/18 (!) 118   02/19/18 106     GENERAL:  This is a well-built, well-groomed, comfortable patient without any       deformities ambulating to clinic.  The patient is in no distress.    NEURO:  Awake, alert and oriented to time, person and place.  The patient   demonstrates no evidence of depression, anxiety or agitation.  Patient is   cooperative with exam and discussion.    EYES:  Normal conjunctivae and eyelids.  PERRLA 3 to 4 mm without icterus.    ENT AND MOUTH:  External appearance of ears and nose normal without scars,   lesions or masses.  Nasal mucosa, turbinates and septum are normal.  No ENT   drainage and dried blood noted.  No tenderness over frontal or maxillary sinus.    Lips and gums are normal.  Dentition is normal.    NECK:  Supple.  Trachea is central.  No crepitus.  No JVD.  No thyromegaly or   masses.     RESPIRATORY:  No intercostal retractions and no use of accessory  muscles of   respirations noted.  No areas of dullness to percussion.  Chest is clear to   auscultation.  No rales, rhonchi or wheezes.  No crepitus.  Good air entry   bilaterally.    CARDIOVASCULAR:  No cardiomegaly.  Normal location.  No thrills or heaviness.    Normal carotid pulse.  No bruits.  S1 and S2 are normally heard without murmurs   or gallops.  All peripheral pulses, including pedal pulses, are present.    ABDOMEN:  Normal abdomen.  No hepatosplenomegaly.  No free fluid.  Bowel sounds   are present.  No hernia noted.  No masses.  No rebound or tenderness.  No   guarding or rigidity.   Umbilicus is midline.    LYMPHATICS:  No axillary, cervical, supraclavicular, submental, or inguinal   lymphadenopathy.    SKIN AND MUSCULOSKELETAL:  There is no evidence of excoriation marks or   ecchymosis.  No rashes.  No pigmented lesions, induration or subcutaneous   nodules.  No sores or abnormal moles.  No cyanosis.  No clubbing.  Nailbed   abnormalities are noted. No joint or skeletal deformities noted.  Normal range   of motion.    BREASTS:  No abnormal masses or discharge.    NEUROLOGIC:  Higher functions are appropriate.   No cranial nerve deficits.    Normal gait.  Normal strength.  Motor and sensory functions are normal.  Deep   tendon reflexes are normal without Babinski's sign or ankle clonus.    GENITAL AND RECTAL:  Exams are deferred.    LABORATORY DATA:   Lab Results   Component Value Date    WBC 8.80 03/10/2018    HGB 11.3 (L) 03/10/2018    HCT 34.7 (L) 03/10/2018    MCV 92 03/10/2018     (L) 03/10/2018     CMP  Sodium   Date Value Ref Range Status   03/10/2018 138 136 - 145 mmol/L Final     Potassium   Date Value Ref Range Status   03/10/2018 3.8 3.5 - 5.1 mmol/L Final     Chloride   Date Value Ref Range Status   03/10/2018 101 95 - 110 mmol/L Final     CO2   Date Value Ref Range Status   03/10/2018 26 23 - 29 mmol/L Final     Glucose   Date Value Ref Range Status   03/10/2018 117 (H) 70 - 110  mg/dL Final     BUN, Bld   Date Value Ref Range Status   03/10/2018 7 (L) 8 - 23 mg/dL Final     Creatinine   Date Value Ref Range Status   03/10/2018 0.7 0.5 - 1.4 mg/dL Final     Calcium   Date Value Ref Range Status   03/10/2018 9.6 8.7 - 10.5 mg/dL Final     Total Protein   Date Value Ref Range Status   03/10/2018 7.9 6.0 - 8.4 g/dL Final     Albumin   Date Value Ref Range Status   03/10/2018 3.1 (L) 3.5 - 5.2 g/dL Final     Total Bilirubin   Date Value Ref Range Status   03/10/2018 0.6 0.1 - 1.0 mg/dL Final     Comment:     For infants and newborns, interpretation of results should be based  on gestational age, weight and in agreement with clinical  observations.  Premature Infant recommended reference ranges:  Up to 24 hours.............<8.0 mg/dL  Up to 48 hours............<12.0 mg/dL  3-5 days..................<15.0 mg/dL  6-29 days.................<15.0 mg/dL       Alkaline Phosphatase   Date Value Ref Range Status   03/10/2018 187 (H) 55 - 135 U/L Final     AST   Date Value Ref Range Status   03/10/2018 18 10 - 40 U/L Final     ALT   Date Value Ref Range Status   03/10/2018 11 10 - 44 U/L Final     Anion Gap   Date Value Ref Range Status   03/10/2018 11 8 - 16 mmol/L Final     eGFR if    Date Value Ref Range Status   03/10/2018 >60 >60 mL/min/1.73 m^2 Final     eGFR if non    Date Value Ref Range Status   03/10/2018 >60 >60 mL/min/1.73 m^2 Final     Comment:     Calculation used to obtain the estimated glomerular filtration  rate (eGFR) is the CKD-EPI equation.          IMPRESSION:  1.  Progressive i.e., metastatic squamous cell lung cancer diagnosed November recent PET 12/2017 ashowing stable dz with decrease in suv    EGFR,ALK, ROS-1, PDL2. All negative  2. Resume rx tomorrow  3.  Depression.  Continue medications and followup.  4.  COPD.  Continue her nebulizers and follow with Dr. Mono Duncan.

## 2018-03-15 DIAGNOSIS — J43.2 CENTRILOBULAR EMPHYSEMA: ICD-10-CM

## 2018-03-15 RX ORDER — PANTOPRAZOLE SODIUM 40 MG/1
TABLET, DELAYED RELEASE ORAL
Qty: 60 TABLET | Refills: 0 | Status: SHIPPED | OUTPATIENT
Start: 2018-03-15

## 2018-03-31 ENCOUNTER — HOSPITAL ENCOUNTER (EMERGENCY)
Facility: HOSPITAL | Age: 78
Discharge: HOME | End: 2018-03-31
Attending: EMERGENCY MEDICINE
Payer: MEDICARE

## 2018-03-31 VITALS
SYSTOLIC BLOOD PRESSURE: 130 MMHG | RESPIRATION RATE: 16 BRPM | OXYGEN SATURATION: 96 % | DIASTOLIC BLOOD PRESSURE: 64 MMHG | TEMPERATURE: 97 F | WEIGHT: 127 LBS | HEART RATE: 101 BPM | BODY MASS INDEX: 23.23 KG/M2

## 2018-03-31 DIAGNOSIS — M25.552 LEFT HIP PAIN: ICD-10-CM

## 2018-03-31 DIAGNOSIS — S32.592A CLOSED FRACTURE OF LEFT INFERIOR PUBIC RAMUS, INITIAL ENCOUNTER: Primary | ICD-10-CM

## 2018-03-31 LAB
ALBUMIN SERPL BCP-MCNC: 2.8 G/DL
ALP SERPL-CCNC: 277 U/L
ALT SERPL W/O P-5'-P-CCNC: 19 U/L
ANION GAP SERPL CALC-SCNC: 11 MMOL/L
AST SERPL-CCNC: 22 U/L
BASOPHILS # BLD AUTO: 0 K/UL
BASOPHILS NFR BLD: 0.2 %
BILIRUB SERPL-MCNC: 0.7 MG/DL
BUN SERPL-MCNC: 10 MG/DL
CALCIUM SERPL-MCNC: 9.3 MG/DL
CHLORIDE SERPL-SCNC: 102 MMOL/L
CO2 SERPL-SCNC: 24 MMOL/L
CREAT SERPL-MCNC: 0.6 MG/DL
DIFFERENTIAL METHOD: ABNORMAL
EOSINOPHIL # BLD AUTO: 0 K/UL
EOSINOPHIL NFR BLD: 0 %
ERYTHROCYTE [DISTWIDTH] IN BLOOD BY AUTOMATED COUNT: 16.9 %
EST. GFR  (AFRICAN AMERICAN): >60 ML/MIN/1.73 M^2
EST. GFR  (NON AFRICAN AMERICAN): >60 ML/MIN/1.73 M^2
GLUCOSE SERPL-MCNC: 109 MG/DL
HCT VFR BLD AUTO: 30.9 %
HGB BLD-MCNC: 9.9 G/DL
LYMPHOCYTES # BLD AUTO: 0.8 K/UL
LYMPHOCYTES NFR BLD: 5.5 %
MCH RBC QN AUTO: 29 PG
MCHC RBC AUTO-ENTMCNC: 32 G/DL
MCV RBC AUTO: 91 FL
MONOCYTES # BLD AUTO: 0.5 K/UL
MONOCYTES NFR BLD: 3.7 %
NEUTROPHILS # BLD AUTO: 12.7 K/UL
NEUTROPHILS NFR BLD: 90.6 %
PLATELET # BLD AUTO: 67 K/UL
PMV BLD AUTO: 7.4 FL
POTASSIUM SERPL-SCNC: 3.7 MMOL/L
PROT SERPL-MCNC: 7.5 G/DL
RBC # BLD AUTO: 3.41 M/UL
SODIUM SERPL-SCNC: 137 MMOL/L
WBC # BLD AUTO: 14 K/UL

## 2018-03-31 PROCEDURE — 99285 EMERGENCY DEPT VISIT HI MDM: CPT

## 2018-03-31 PROCEDURE — 80053 COMPREHEN METABOLIC PANEL: CPT

## 2018-03-31 PROCEDURE — 85025 COMPLETE CBC W/AUTO DIFF WBC: CPT

## 2018-03-31 PROCEDURE — 36415 COLL VENOUS BLD VENIPUNCTURE: CPT

## 2018-03-31 NOTE — ED PROVIDER NOTES
Encounter Date: 3/31/2018    SCRIBE #1 NOTE: I, Jh Brown, am scribing for, and in the presence of, Dr. Martinez.       History     Chief Complaint   Patient presents with    Hip Pain     trip and fall. c/o LEFT hip pain       03/31/2018 11:52 AM     Chief complaint: Left hip pain      Delicia Murray is a 78 y.o. female with a history of anemia, COPD, and lung cancer who presents to the ED via EMS with an onset of left hip pain s/p fall shortly prior to arrival with no other associated symptoms. The patient states that she fell this morning while getting coffee in the kitchen after being unable to feel her feet due to chemo. She states that she fell directly on her hip and that the pain was only present when she got back on her feet and when she would move her hip after the incident. She currently is not feeling any pain in her hip. She is currently on chemotherapy for her lung cancer. She denies any loss of consciousness, head trauma, osteoporosis, osteopenia, or any other injury due to the fall. No pertinent Shx noted. She presents with no other acute complaints.         The history is provided by the patient and the EMS personnel.     Review of patient's allergies indicates:   Allergen Reactions    Ferrlecit [sodium ferric gluconat-sucrose]      Generalized rash, pruritus    Sulfa (sulfonamide antibiotics)     Adhesive Rash     Past Medical History:   Diagnosis Date    Anemia     Blood transfusion     Cancer     lung cancer    Colon polyps     COPD (chronic obstructive pulmonary disease)     Depression     Insomnia     Vertigo      Past Surgical History:   Procedure Laterality Date    broken jaw      left    CHOLECYSTECTOMY      HYSTERECTOMY      radiation      SKIN BIOPSY      TONSILLECTOMY       Family History   Problem Relation Age of Onset    Heart disease Mother     Cancer Father 69     unknown     Social History   Substance Use Topics    Smoking status: Former Smoker      Packs/day: 0.50     Years: 60.00    Smokeless tobacco: Former User     Quit date: 10/3/2016    Alcohol use Yes      Comment: occasional     Review of Systems   HENT:        Negative for head trauma   Cardiovascular: Negative for chest pain.   Gastrointestinal: Negative for abdominal pain.   Musculoskeletal: Positive for arthralgias (left hip pain). Negative for joint swelling.   Skin: Negative for rash.   Psychiatric/Behavioral: Negative for confusion.       Physical Exam     There were no vitals filed for this visit.    Physical Exam    Nursing note and vitals reviewed.  Constitutional: She appears well-developed and well-nourished.   HENT:   Head: Normocephalic and atraumatic.   Eyes: EOM are normal.   Neck: Normal range of motion. Neck supple.   Cardiovascular: Normal rate, regular rhythm and normal heart sounds. Exam reveals no gallop and no friction rub.    No murmur heard.  Pulmonary/Chest: Breath sounds normal. No respiratory distress. She has no wheezes. She has no rhonchi. She has no rales.   Musculoskeletal: Normal range of motion.   Full ROM to left hip without tenderness.   Neurological: She is alert and oriented to person, place, and time.   Skin: Skin is warm and dry.   Psychiatric: She has a normal mood and affect. Her behavior is normal. Judgment and thought content normal.         ED Course   Procedures  Labs Reviewed - No data to display     Imaging Results    None            Medical Decision Making:   History:   Old Medical Records: I decided to obtain old medical records.  Clinical Tests:   Lab Tests: Reviewed and Ordered  Radiological Study: Ordered and Reviewed            Scribe Attestation:   Scribe #1: I performed the above scribed service and the documentation accurately describes the services I performed. I attest to the accuracy of the note.    I, Dr. Martinez, personally performed the services described in this documentation. All medical record entries made by the scribe were at my  direction and in my presence.  I have reviewed the chart and agree that the record reflects my personal performance and is accurate and complete.3:50 PM 03/31/2018            ED Course as of Mar 31 1550   Sat Mar 31, 2018   1310 Patient unable to stand up and bear weight on her left leg but she does have some pain inferior to the left hip.  Full range of motion in the left hip.  Patient does not think she can tolerate an MRI of the hip.  My suspicion for an occult fracture is very low but the patient does have some osteopenia.  CT of the bony pelvis will be ordered if this is negative I think the patient can be discharged home.  [EF]      ED Course User Index  [EF] Darien Martinez MD     Clinical Impression:   No diagnosis found.      Disposition:   Disposition: Discharged  Condition: Stable         78-year-old female currently on chemotherapy for lung cancer presents to the ER with left hip pain after a fall.  Patient is able stand and bear weight but this does cause some pain on the outer leg inferior to the greater trochanter of the left leg.  CT of the abdomen and pelvis demonstrates a pubic ramus fracture which is nondisplaced.  This likely explains her outer leg pain.  Patient reports that she cannot tolerate an MRI, I have a very low suspicion for an occult hip fracture I do not feel that this test is warranted.  Patient can stand up and walk.  I did not think much she needs to be admitted to the hospital for bone scan. Did her labs also in the ER.       Darien Martinez MD  03/31/18 7524

## 2018-03-31 NOTE — ED NOTES
Presents to ED s/p trip and fall. C/o pain to left hip. Pt was able to stand afterwards. No obvious deformity noted. Pt placed on BP, pulse ox, cardiac monitor. JARAD PILLAI at bedside rick damon. Will monitor prn.

## 2018-04-02 ENCOUNTER — TELEPHONE (OUTPATIENT)
Dept: FAMILY MEDICINE | Facility: CLINIC | Age: 78
End: 2018-04-02

## 2018-04-02 ENCOUNTER — TELEPHONE (OUTPATIENT)
Dept: HEMATOLOGY/ONCOLOGY | Facility: CLINIC | Age: 78
End: 2018-04-02

## 2018-04-02 DIAGNOSIS — C34.00 MALIGNANT NEOPLASM OF HILUS OF LUNG, UNSPECIFIED LATERALITY: ICD-10-CM

## 2018-04-02 RX ORDER — HYDROCODONE BITARTRATE AND ACETAMINOPHEN 5; 325 MG/1; MG/1
1 TABLET ORAL EVERY 4 HOURS PRN
Qty: 60 TABLET | Refills: 0 | Status: SHIPPED | OUTPATIENT
Start: 2018-04-02

## 2018-04-02 NOTE — TELEPHONE ENCOUNTER
Spoke with patient's daughter, Kerrie.  She stated that her mother had a fall and was told she may not be able to make it to chemo for 4 to 6 weeks.  Dr. Ceballos and Letha Sunshine, PON[facility] Navigator notified to communicated with patient and chemo schedule.

## 2018-04-02 NOTE — TELEPHONE ENCOUNTER
Patient fell and fracture pelvis.   Patient was put on 2% oxygen a few weeks ago and has become more dependent on it.  She feels like she is not getting enough air.  Patient scheduled for a followup

## 2018-04-02 NOTE — TELEPHONE ENCOUNTER
----- Message from Judy Hayes sent at 4/2/2018  7:23 AM CDT -----    Calling  To  Speak to  The  Nurse  About   Pt  Fall  // please  Call anitha   Daughter [phone]

## 2018-04-02 NOTE — TELEPHONE ENCOUNTER
----- Message from Judy Hayes sent at 4/2/2018  8:05 AM CDT -----    Calling  To  Speak to the  Nurse daughter   [place] // [phone] // concerting    Mom   Fall and   Pelvic  injury

## 2018-04-02 NOTE — TELEPHONE ENCOUNTER
----- Message from Faye Hammond LPN sent at 4/2/2018  1:32 PM CDT -----  Helaura Ceballos;    I wanted to give you more information on Ms. Murray.      She fell and broke her pelvis and is not sure when she will be able to return to chemo, as she cannot make it there and is not sure when she will be able to.  Her daughter stated she is in a lot of pain and requested that you change her sig on norco 5/325 to read 1 to 2 tabs every 4 hours as needed.    I sent refill request.  Daughter is going to keep us posted as to when patient will be able to get up and move around to make it to chemo.    Thank you!  Faye

## 2018-04-03 NOTE — TELEPHONE ENCOUNTER
----- Message from Kaitlin Do sent at 4/3/2018  3:10 PM CDT -----  Contact: Kerrie Sy patient's daughter called regarding order for home health. Call back at [phone]. Thanks,

## 2018-04-03 NOTE — TELEPHONE ENCOUNTER
Spoke to patient's daughter (Kerrie) who states she doesn't believe patient needs to go to ED. Advised Mrs. Sy per Dr. Duncan to add 81 mg ASA daily. Mrs. Sy is requesting home health orders for patient. Please advise.

## 2018-04-04 ENCOUNTER — TELEPHONE (OUTPATIENT)
Dept: HEMATOLOGY/ONCOLOGY | Facility: CLINIC | Age: 78
End: 2018-04-04

## 2018-04-04 ENCOUNTER — TELEPHONE (OUTPATIENT)
Dept: FAMILY MEDICINE | Facility: CLINIC | Age: 78
End: 2018-04-04

## 2018-04-04 DIAGNOSIS — T45.1X5A CHEMOTHERAPY INDUCED NEUTROPENIA: ICD-10-CM

## 2018-04-04 DIAGNOSIS — D63.8 ANEMIA, CHRONIC DISEASE: ICD-10-CM

## 2018-04-04 DIAGNOSIS — J84.10 PULMONARY FIBROSIS: ICD-10-CM

## 2018-04-04 DIAGNOSIS — C34.11 MALIGNANT NEOPLASM OF UPPER LOBE OF RIGHT LUNG: ICD-10-CM

## 2018-04-04 DIAGNOSIS — J44.9 COPD MIXED TYPE: Primary | ICD-10-CM

## 2018-04-04 DIAGNOSIS — D70.1 CHEMOTHERAPY INDUCED NEUTROPENIA: ICD-10-CM

## 2018-04-04 DIAGNOSIS — J43.2 CENTRILOBULAR EMPHYSEMA: ICD-10-CM

## 2018-04-04 DIAGNOSIS — J96.21 ACUTE ON CHRONIC RESPIRATORY FAILURE WITH HYPOXEMIA: ICD-10-CM

## 2018-04-04 DIAGNOSIS — D50.0 IRON DEFICIENCY ANEMIA DUE TO CHRONIC BLOOD LOSS: Chronic | ICD-10-CM

## 2018-04-04 NOTE — TELEPHONE ENCOUNTER
Referral was arranged with [facility]. This referral is not an alternative to ED. Patient and daughter decline ED despite the need for urgent care.

## 2018-04-04 NOTE — TELEPHONE ENCOUNTER
We gave her options to be seen urgently to Ed.or Home health. The care at home is an elective option to evaluate care at home and further arrange for the best resources that she could get at home. The , home health and physical therapy are providers to address her physical and health needs. They will try to coordinate care to improve her quality of living at her home.

## 2018-04-04 NOTE — TELEPHONE ENCOUNTER
----- Message from Kellie De La O sent at 4/4/2018  7:56 AM CDT -----  Contact: Deneen Trammell [phone]  Patient;s daughter Alyssa Trammell just called she is upset she states she called yesterday and left a detail message her mother is very sick she needs home health she has been on the sofa for the past 4 days also she has not had a bowel movement in 4 days per her daughter Alyssa. Daughter Is asking for a call back  Today please. They need help. Call back to Deneen Trammell [phone]

## 2018-04-04 NOTE — TELEPHONE ENCOUNTER
----- Message from Dao Ac sent at 4/4/2018  9:44 AM CDT -----  Contact: Pt Daughter  Patients Daughter called asking for an order from Home Health for Patient. Please call back and advise    Call Back# [phone]  Thanks

## 2018-04-04 NOTE — TELEPHONE ENCOUNTER
----- Message from Avani Squires sent at 4/4/2018  1:23 PM CDT -----  Contact: [facility] with [facility] with [facility] needs to speak to the nurse about patient     Please call back [phone]

## 2018-04-04 NOTE — TELEPHONE ENCOUNTER
Patient's daughter (Alyssa) notified home health orders placed and faxed to [facility] at this time. Verbalized understanding.

## 2018-04-04 NOTE — TELEPHONE ENCOUNTER
Received call from patient's daughter (Alyssa) stating she requested home health orders for patient but have not received orders as of yet. Please see encounter dated yesterday's date (4-3-18).  Writer received request for home health, spoke to patient's daughter (Kerrie) regarding this matter. Request was forwarded to Dr. Duncan for home health orders on 4-3-18, awaiting MD response. Mrs. Shah notified of above, stated this explanation was unacceptable. Also states she feels as if her mother is not receiving adequate care. Mrs. Shah requested to speak to the  regarding this matter. Writer notified Dhaval Soto who contacted patient regarding.

## 2018-04-05 ENCOUNTER — TELEPHONE (OUTPATIENT)
Dept: HEMATOLOGY/ONCOLOGY | Facility: CLINIC | Age: 78
End: 2018-04-05

## 2018-04-05 ENCOUNTER — TELEPHONE (OUTPATIENT)
Dept: FAMILY MEDICINE | Facility: CLINIC | Age: 78
End: 2018-04-05

## 2018-04-05 DIAGNOSIS — J44.9 CHRONIC OBSTRUCTIVE PULMONARY DISEASE, UNSPECIFIED COPD TYPE: Primary | ICD-10-CM

## 2018-04-05 DIAGNOSIS — J44.89 COPD (CHRONIC OBSTRUCTIVE PULMONARY DISEASE) WITH CHRONIC BRONCHITIS: Primary | ICD-10-CM

## 2018-04-05 DIAGNOSIS — J96.21 ACUTE AND CHRONIC RESPIRATORY FAILURE WITH HYPOXIA: ICD-10-CM

## 2018-04-05 NOTE — TELEPHONE ENCOUNTER
Sary with [facility] requesting orders for the following medical equipment:  1. Bedside Commode  2. Wheelchair  3. Hospital Bed    States patient's height is 5 foot/weight 127 lbs. Reports patient's pain level as 5-8 on scale of 0-10.  Please advise.

## 2018-04-05 NOTE — TELEPHONE ENCOUNTER
----- Message from Zelda Linda sent at 4/5/2018  2:54 PM CDT -----  Lo with [facility] states patient anthony crackle to all lobs of lungs is at risk of pneumonia due to kasie mobility and has not had a bowel movement since Saturday, is passing gas, she can order a portable chest xray at home, contact her at [phone].    Thank you

## 2018-04-05 NOTE — TELEPHONE ENCOUNTER
----- Message from Mai Workman sent at 4/5/2018 11:21 AM CDT -----  Contact: Sray escobedo/ [facility]  Sary escobedo/ [facility] calling to report a home health admission that requires a call back. Please advise.  Call back Sary at   Thanks!

## 2018-04-06 NOTE — TELEPHONE ENCOUNTER
Orders for portable x-ray placed by Dr. Duncan. Lo with [facility] notified. Verbalized understanding. Mrs. Blanchard states patient is doing much better today.

## 2018-04-06 NOTE — TELEPHONE ENCOUNTER
Orders for wheelchair, hospital bed, and bedside commode placed by Dr. Duncan. Orders and clinical notes faxed to [employer] [facility] @ [phone].  Lo with [facility] notified of orders.

## 2018-04-07 ENCOUNTER — HOSPITAL ENCOUNTER (INPATIENT)
Facility: HOSPITAL | Age: 78
LOS: 6 days | Discharge: HOSPICE | DRG: 871 | End: 2018-04-13
Attending: EMERGENCY MEDICINE | Admitting: HOSPITALIST
Payer: MEDICARE

## 2018-04-07 DIAGNOSIS — R06.02 SOB (SHORTNESS OF BREATH): ICD-10-CM

## 2018-04-07 DIAGNOSIS — J44.1 COPD WITH EXACERBATION: ICD-10-CM

## 2018-04-07 DIAGNOSIS — J44.1 ACUTE EXACERBATION OF CHRONIC OBSTRUCTIVE PULMONARY DISEASE (COPD): ICD-10-CM

## 2018-04-07 DIAGNOSIS — R00.0 TACHYCARDIA: ICD-10-CM

## 2018-04-07 DIAGNOSIS — C34.11 MALIGNANT NEOPLASM OF UPPER LOBE OF RIGHT LUNG: Primary | ICD-10-CM

## 2018-04-07 PROBLEM — J44.9 COPD (CHRONIC OBSTRUCTIVE PULMONARY DISEASE): Status: ACTIVE | Noted: 2018-04-07

## 2018-04-07 PROBLEM — J18.9 CAP (COMMUNITY ACQUIRED PNEUMONIA): Status: ACTIVE | Noted: 2018-04-07

## 2018-04-07 PROBLEM — A41.9 SEPSIS: Status: ACTIVE | Noted: 2018-04-07

## 2018-04-07 LAB
ALBUMIN SERPL BCP-MCNC: 2.1 G/DL
ALP SERPL-CCNC: 249 U/L
ALT SERPL W/O P-5'-P-CCNC: 9 U/L
ANION GAP SERPL CALC-SCNC: 16 MMOL/L
AST SERPL-CCNC: 21 U/L
BACTERIA #/AREA URNS HPF: ABNORMAL /HPF
BASOPHILS # BLD AUTO: 0 K/UL
BASOPHILS NFR BLD: 0 %
BILIRUB SERPL-MCNC: 1.2 MG/DL
BILIRUB UR QL STRIP: ABNORMAL
BNP SERPL-MCNC: 674 PG/ML
BUN SERPL-MCNC: 30 MG/DL
CALCIUM SERPL-MCNC: 8.9 MG/DL
CHLORIDE SERPL-SCNC: 101 MMOL/L
CLARITY UR: ABNORMAL
CO2 SERPL-SCNC: 24 MMOL/L
COLOR UR: YELLOW
CREAT SERPL-MCNC: 0.9 MG/DL
CRP SERPL-MCNC: 259.5 MG/L
D DIMER PPP IA.FEU-MCNC: 2.96 MG/L FEU
DIFFERENTIAL METHOD: ABNORMAL
EOSINOPHIL # BLD AUTO: 0 K/UL
EOSINOPHIL NFR BLD: 0.1 %
ERYTHROCYTE [DISTWIDTH] IN BLOOD BY AUTOMATED COUNT: 18.3 %
ERYTHROCYTE [SEDIMENTATION RATE] IN BLOOD BY WESTERGREN METHOD: 135 MM/HR
EST. GFR  (AFRICAN AMERICAN): >60 ML/MIN/1.73 M^2
EST. GFR  (NON AFRICAN AMERICAN): >60 ML/MIN/1.73 M^2
GLUCOSE SERPL-MCNC: 89 MG/DL
GLUCOSE UR QL STRIP: NEGATIVE
HCT VFR BLD AUTO: 26 %
HGB BLD-MCNC: 8.5 G/DL
HGB UR QL STRIP: NEGATIVE
INR PPP: 1.1
KETONES UR QL STRIP: ABNORMAL
LACTATE SERPL-SCNC: 2.6 MMOL/L
LACTATE SERPL-SCNC: 3.3 MMOL/L
LEUKOCYTE ESTERASE UR QL STRIP: ABNORMAL
LYMPHOCYTES # BLD AUTO: 0.9 K/UL
LYMPHOCYTES NFR BLD: 3.7 %
MCH RBC QN AUTO: 29.7 PG
MCHC RBC AUTO-ENTMCNC: 32.6 G/DL
MCV RBC AUTO: 91 FL
MICROSCOPIC COMMENT: ABNORMAL
MONOCYTES # BLD AUTO: 2.1 K/UL
MONOCYTES NFR BLD: 8.4 %
NEUTROPHILS # BLD AUTO: 22.1 K/UL
NEUTROPHILS NFR BLD: 87.8 %
NITRITE UR QL STRIP: NEGATIVE
PH UR STRIP: 5 [PH] (ref 5–8)
PLATELET # BLD AUTO: 68 K/UL
PMV BLD AUTO: 7.7 FL
POTASSIUM SERPL-SCNC: 3.3 MMOL/L
PROCALCITONIN SERPL IA-MCNC: 5.05 NG/ML
PROT SERPL-MCNC: 6.7 G/DL
PROT UR QL STRIP: ABNORMAL
PROTHROMBIN TIME: 10.7 SEC
RBC # BLD AUTO: 2.86 M/UL
SODIUM SERPL-SCNC: 141 MMOL/L
SP GR UR STRIP: 1.02 (ref 1–1.03)
URN SPEC COLLECT METH UR: ABNORMAL
UROBILINOGEN UR STRIP-ACNC: ABNORMAL EU/DL
WBC # BLD AUTO: 25.1 K/UL
WBC #/AREA URNS HPF: 25 /HPF (ref 0–5)

## 2018-04-07 PROCEDURE — 85025 COMPLETE CBC W/AUTO DIFF WBC: CPT

## 2018-04-07 PROCEDURE — 63600175 PHARM REV CODE 636 W HCPCS: Performed by: EMERGENCY MEDICINE

## 2018-04-07 PROCEDURE — 85651 RBC SED RATE NONAUTOMATED: CPT

## 2018-04-07 PROCEDURE — 11000001 HC ACUTE MED/SURG PRIVATE ROOM

## 2018-04-07 PROCEDURE — 85610 PROTHROMBIN TIME: CPT

## 2018-04-07 PROCEDURE — 25000003 PHARM REV CODE 250: Performed by: FAMILY MEDICINE

## 2018-04-07 PROCEDURE — 25000242 PHARM REV CODE 250 ALT 637 W/ HCPCS: Performed by: FAMILY MEDICINE

## 2018-04-07 PROCEDURE — 93005 ELECTROCARDIOGRAM TRACING: CPT

## 2018-04-07 PROCEDURE — 83036 HEMOGLOBIN GLYCOSYLATED A1C: CPT

## 2018-04-07 PROCEDURE — 87077 CULTURE AEROBIC IDENTIFY: CPT

## 2018-04-07 PROCEDURE — 81000 URINALYSIS NONAUTO W/SCOPE: CPT

## 2018-04-07 PROCEDURE — 84145 PROCALCITONIN (PCT): CPT

## 2018-04-07 PROCEDURE — 99285 EMERGENCY DEPT VISIT HI MDM: CPT | Mod: 25

## 2018-04-07 PROCEDURE — 25000242 PHARM REV CODE 250 ALT 637 W/ HCPCS: Performed by: EMERGENCY MEDICINE

## 2018-04-07 PROCEDURE — 86140 C-REACTIVE PROTEIN: CPT

## 2018-04-07 PROCEDURE — 80053 COMPREHEN METABOLIC PANEL: CPT

## 2018-04-07 PROCEDURE — 94640 AIRWAY INHALATION TREATMENT: CPT

## 2018-04-07 PROCEDURE — 87086 URINE CULTURE/COLONY COUNT: CPT

## 2018-04-07 PROCEDURE — 94644 CONT INHLJ TX 1ST HOUR: CPT

## 2018-04-07 PROCEDURE — 83605 ASSAY OF LACTIC ACID: CPT | Mod: 91

## 2018-04-07 PROCEDURE — 87088 URINE BACTERIA CULTURE: CPT

## 2018-04-07 PROCEDURE — 87186 SC STD MICRODIL/AGAR DIL: CPT | Mod: 59

## 2018-04-07 PROCEDURE — 25000003 PHARM REV CODE 250: Performed by: EMERGENCY MEDICINE

## 2018-04-07 PROCEDURE — 63600175 PHARM REV CODE 636 W HCPCS: Performed by: FAMILY MEDICINE

## 2018-04-07 PROCEDURE — 36415 COLL VENOUS BLD VENIPUNCTURE: CPT

## 2018-04-07 PROCEDURE — 83880 ASSAY OF NATRIURETIC PEPTIDE: CPT

## 2018-04-07 PROCEDURE — 85379 FIBRIN DEGRADATION QUANT: CPT

## 2018-04-07 PROCEDURE — 87040 BLOOD CULTURE FOR BACTERIA: CPT

## 2018-04-07 PROCEDURE — 94761 N-INVAS EAR/PLS OXIMETRY MLT: CPT

## 2018-04-07 PROCEDURE — 83605 ASSAY OF LACTIC ACID: CPT

## 2018-04-07 PROCEDURE — 27000221 HC OXYGEN, UP TO 24 HOURS

## 2018-04-07 RX ORDER — SODIUM CHLORIDE 0.9 % (FLUSH) 0.9 %
5 SYRINGE (ML) INJECTION
Status: DISCONTINUED | OUTPATIENT
Start: 2018-04-07 | End: 2018-04-13 | Stop reason: HOSPADM

## 2018-04-07 RX ORDER — ACETAMINOPHEN 325 MG/1
650 TABLET ORAL EVERY 4 HOURS PRN
Status: DISCONTINUED | OUTPATIENT
Start: 2018-04-07 | End: 2018-04-13 | Stop reason: HOSPADM

## 2018-04-07 RX ORDER — IPRATROPIUM BROMIDE AND ALBUTEROL SULFATE 2.5; .5 MG/3ML; MG/3ML
3 SOLUTION RESPIRATORY (INHALATION) EVERY 4 HOURS
Status: DISCONTINUED | OUTPATIENT
Start: 2018-04-07 | End: 2018-04-07

## 2018-04-07 RX ORDER — MIRTAZAPINE 15 MG/1
30 TABLET, FILM COATED ORAL NIGHTLY
Status: DISCONTINUED | OUTPATIENT
Start: 2018-04-07 | End: 2018-04-13 | Stop reason: HOSPADM

## 2018-04-07 RX ORDER — IPRATROPIUM BROMIDE AND ALBUTEROL SULFATE 2.5; .5 MG/3ML; MG/3ML
3 SOLUTION RESPIRATORY (INHALATION) EVERY 6 HOURS
Status: DISCONTINUED | OUTPATIENT
Start: 2018-04-07 | End: 2018-04-07 | Stop reason: SDUPTHER

## 2018-04-07 RX ORDER — SODIUM,POTASSIUM PHOSPHATES 280-250MG
2 POWDER IN PACKET (EA) ORAL
Status: DISCONTINUED | OUTPATIENT
Start: 2018-04-07 | End: 2018-04-13 | Stop reason: HOSPADM

## 2018-04-07 RX ORDER — LANOLIN ALCOHOL/MO/W.PET/CERES
800 CREAM (GRAM) TOPICAL
Status: DISCONTINUED | OUTPATIENT
Start: 2018-04-07 | End: 2018-04-13 | Stop reason: HOSPADM

## 2018-04-07 RX ORDER — HYDROCODONE BITARTRATE AND ACETAMINOPHEN 5; 325 MG/1; MG/1
1 TABLET ORAL EVERY 4 HOURS PRN
Status: DISCONTINUED | OUTPATIENT
Start: 2018-04-07 | End: 2018-04-13 | Stop reason: HOSPADM

## 2018-04-07 RX ORDER — IPRATROPIUM BROMIDE AND ALBUTEROL SULFATE 2.5; .5 MG/3ML; MG/3ML
3 SOLUTION RESPIRATORY (INHALATION) EVERY 6 HOURS
Status: DISCONTINUED | OUTPATIENT
Start: 2018-04-07 | End: 2018-04-07

## 2018-04-07 RX ORDER — GLUCAGON 1 MG
1 KIT INJECTION
Status: DISCONTINUED | OUTPATIENT
Start: 2018-04-07 | End: 2018-04-13 | Stop reason: HOSPADM

## 2018-04-07 RX ORDER — IPRATROPIUM BROMIDE AND ALBUTEROL SULFATE 2.5; .5 MG/3ML; MG/3ML
3 SOLUTION RESPIRATORY (INHALATION) EVERY 4 HOURS
Status: DISCONTINUED | OUTPATIENT
Start: 2018-04-07 | End: 2018-04-12

## 2018-04-07 RX ORDER — POTASSIUM CHLORIDE 20 MEQ/15ML
40 SOLUTION ORAL
Status: DISCONTINUED | OUTPATIENT
Start: 2018-04-07 | End: 2018-04-13 | Stop reason: HOSPADM

## 2018-04-07 RX ORDER — NAPROXEN SODIUM 220 MG/1
81 TABLET, FILM COATED ORAL DAILY
COMMUNITY

## 2018-04-07 RX ORDER — IBUPROFEN 200 MG
24 TABLET ORAL
Status: DISCONTINUED | OUTPATIENT
Start: 2018-04-07 | End: 2018-04-13 | Stop reason: HOSPADM

## 2018-04-07 RX ORDER — PANTOPRAZOLE SODIUM 40 MG/1
40 TABLET, DELAYED RELEASE ORAL DAILY
Status: DISCONTINUED | OUTPATIENT
Start: 2018-04-07 | End: 2018-04-13 | Stop reason: HOSPADM

## 2018-04-07 RX ORDER — ENOXAPARIN SODIUM 100 MG/ML
40 INJECTION SUBCUTANEOUS EVERY 24 HOURS
Status: DISCONTINUED | OUTPATIENT
Start: 2018-04-07 | End: 2018-04-10

## 2018-04-07 RX ORDER — ALPRAZOLAM 0.25 MG/1
0.25 TABLET ORAL 3 TIMES DAILY PRN
Status: DISCONTINUED | OUTPATIENT
Start: 2018-04-07 | End: 2018-04-13 | Stop reason: HOSPADM

## 2018-04-07 RX ORDER — SODIUM CHLORIDE 9 MG/ML
INJECTION, SOLUTION INTRAVENOUS CONTINUOUS
Status: DISCONTINUED | OUTPATIENT
Start: 2018-04-07 | End: 2018-04-09

## 2018-04-07 RX ORDER — IPRATROPIUM BROMIDE AND ALBUTEROL SULFATE 2.5; .5 MG/3ML; MG/3ML
9 SOLUTION RESPIRATORY (INHALATION) CONTINUOUS
Status: DISCONTINUED | OUTPATIENT
Start: 2018-04-07 | End: 2018-04-07

## 2018-04-07 RX ORDER — IBUPROFEN 200 MG
16 TABLET ORAL
Status: DISCONTINUED | OUTPATIENT
Start: 2018-04-07 | End: 2018-04-13 | Stop reason: HOSPADM

## 2018-04-07 RX ORDER — ONDANSETRON 2 MG/ML
4 INJECTION INTRAMUSCULAR; INTRAVENOUS EVERY 8 HOURS PRN
Status: DISCONTINUED | OUTPATIENT
Start: 2018-04-07 | End: 2018-04-13 | Stop reason: HOSPADM

## 2018-04-07 RX ORDER — SERTRALINE HYDROCHLORIDE 50 MG/1
100 TABLET, FILM COATED ORAL NIGHTLY
Status: DISCONTINUED | OUTPATIENT
Start: 2018-04-07 | End: 2018-04-09

## 2018-04-07 RX ORDER — ACETAMINOPHEN 325 MG/1
650 TABLET ORAL EVERY 6 HOURS PRN
Status: DISCONTINUED | OUTPATIENT
Start: 2018-04-07 | End: 2018-04-13 | Stop reason: HOSPADM

## 2018-04-07 RX ADMIN — IPRATROPIUM BROMIDE AND ALBUTEROL SULFATE 3 ML: .5; 3 SOLUTION RESPIRATORY (INHALATION) at 03:04

## 2018-04-07 RX ADMIN — PANTOPRAZOLE SODIUM 40 MG: 40 TABLET, DELAYED RELEASE ORAL at 10:04

## 2018-04-07 RX ADMIN — AZITHROMYCIN MONOHYDRATE 500 MG: 500 INJECTION, POWDER, LYOPHILIZED, FOR SOLUTION INTRAVENOUS at 03:04

## 2018-04-07 RX ADMIN — ALPRAZOLAM 0.25 MG: 0.25 TABLET ORAL at 07:04

## 2018-04-07 RX ADMIN — SODIUM CHLORIDE 500 ML: 0.9 INJECTION, SOLUTION INTRAVENOUS at 12:04

## 2018-04-07 RX ADMIN — HYDROCODONE BITARTRATE AND ACETAMINOPHEN 1 TABLET: 5; 325 TABLET ORAL at 10:04

## 2018-04-07 RX ADMIN — MIRTAZAPINE 30 MG: 15 TABLET, FILM COATED ORAL at 08:04

## 2018-04-07 RX ADMIN — SODIUM CHLORIDE: 0.9 INJECTION, SOLUTION INTRAVENOUS at 10:04

## 2018-04-07 RX ADMIN — POTASSIUM CHLORIDE 40 MEQ: 20 SOLUTION ORAL at 02:04

## 2018-04-07 RX ADMIN — ENOXAPARIN SODIUM 40 MG: 100 INJECTION SUBCUTANEOUS at 10:04

## 2018-04-07 RX ADMIN — SODIUM CHLORIDE 500 ML: 0.9 INJECTION, SOLUTION INTRAVENOUS at 02:04

## 2018-04-07 RX ADMIN — SERTRALINE HYDROCHLORIDE 100 MG: 50 TABLET ORAL at 09:04

## 2018-04-07 RX ADMIN — CEFTRIAXONE 1 G: 1 INJECTION, SOLUTION INTRAVENOUS at 02:04

## 2018-04-07 RX ADMIN — HYDROCODONE BITARTRATE AND ACETAMINOPHEN 1 TABLET: 5; 325 TABLET ORAL at 02:04

## 2018-04-07 RX ADMIN — IPRATROPIUM BROMIDE AND ALBUTEROL SULFATE 3 ML: .5; 3 SOLUTION RESPIRATORY (INHALATION) at 07:04

## 2018-04-07 RX ADMIN — HYDROCODONE BITARTRATE AND ACETAMINOPHEN 1 TABLET: 5; 325 TABLET ORAL at 06:04

## 2018-04-07 RX ADMIN — IPRATROPIUM BROMIDE AND ALBUTEROL SULFATE 9 ML: .5; 3 SOLUTION RESPIRATORY (INHALATION) at 05:04

## 2018-04-07 NOTE — PROGRESS NOTES
04/07/18 0555   Patient Assessment/Suction   Level of Consciousness (AVPU) alert   Respiratory Effort Moderate   Expansion/Accessory Muscles/Retractions accessory muscle use;abdominal muscle use   All Lung Fields Breath Sounds crackles coarse   Rhythm/Pattern, Respiratory tachypnea   PRE-TX-O2-ETCO2   SpO2 95 %   Pulse Oximetry Type Continuous   Pulse (!) 134   Resp (!) 44   Aerosol Therapy   $ Aerosol Therapy Charges Initial Continuous   Respiratory Treatment Status given;continuous   SVN/Inhaler Treatment Route mask   Position During Treatment HOB at 45 degrees   Patient Tolerance good

## 2018-04-07 NOTE — ASSESSMENT & PLAN NOTE
Secondary due to community-acquired pneumonia.  Pro-calcitonin 5.05.  Will obtain sedimentation rate and CRP.  Curb 65 score is 2 which puts patient at moderate risk group.  Will admit to inpatient status.

## 2018-04-07 NOTE — SUBJECTIVE & OBJECTIVE
Past Medical History:   Diagnosis Date    Anemia     Blood transfusion     Cancer     lung cancer    Colon polyps     COPD (chronic obstructive pulmonary disease)     Depression     Insomnia     Vertigo        Past Surgical History:   Procedure Laterality Date    broken jaw      left    CHOLECYSTECTOMY      HYSTERECTOMY      radiation      SKIN BIOPSY      TONSILLECTOMY         Review of patient's allergies indicates:   Allergen Reactions    Ferrlecit [sodium ferric gluconat-sucrose]      Generalized rash, pruritus    Sulfa (sulfonamide antibiotics)     Adhesive Rash       Current Facility-Administered Medications on File Prior to Encounter   Medication    0.9%  NaCl infusion    ferric carboxymaltose (INJECTAFER) 750 mg in sodium chloride 0.9% 250 mL infusion    ferric gluconate (FERRLECIT) 125 mg in sodium chloride 0.9% 100 mL IVPB    heparin, porcine (PF) 100 unit/mL injection flush 500 Units    sodium chloride 0.9% flush 10 mL    sodium chloride 0.9% flush 10 mL     Current Outpatient Prescriptions on File Prior to Encounter   Medication Sig    acetaminophen (TYLENOL) 325 MG tablet Take 325 mg by mouth every 6 (six) hours as needed.    albuterol 90 mcg/actuation inhaler 2 puffs every 4 hours as needed for cough, wheeze, or shortness of breath    ALPRAZolam (XANAX) 0.25 MG tablet TAKE 1 TABLET BY MOUTH THREE TIMES DAILY AS NEEDED    hydrocodone-acetaminophen 5-325mg (NORCO) 5-325 mg per tablet Take 1 tablet by mouth every 4 (four) hours as needed for Pain.    lidocaine-prilocaine (EMLA) cream Apply to affected area once    mirtazapine (REMERON) 30 MG tablet TAKE 1 TABLET(30 MG) BY MOUTH EVERY EVENING    ondansetron (ZOFRAN-ODT) 8 MG TbDL Take 1 tablet (8 mg total) by mouth every 12 (twelve) hours as needed.    pantoprazole (PROTONIX) 40 MG tablet TAKE 1 TABLET(40 MG) BY MOUTH TWICE DAILY    prochlorperazine (COMPAZINE) 10 MG tablet TAKE 1 TABLET BY MOUTH EVERY 6 HOURS    sertraline  (ZOLOFT) 100 MG tablet Take 1 tablet (100 mg total) by mouth every evening.    umeclidinium-vilanterol (ANORO ELLIPTA) 62.5-25 mcg/actuation DsDv Inhale 1 puff into the lungs once daily.    [DISCONTINUED] pantoprazole (PROTONIX) 40 MG tablet Take 1 tablet (40 mg total) by mouth once daily.     Family History     Problem Relation (Age of Onset)    Cancer Father (69)    Heart disease Mother        Social History Main Topics    Smoking status: Former Smoker     Packs/day: 0.50     Years: 60.00    Smokeless tobacco: Former User     Quit date: 10/3/2016    Alcohol use Yes      Comment: occasional    Drug use: No    Sexual activity: Not Currently     Review of Systems   Constitutional: Positive for chills and fever.   HENT: Negative for sneezing and sore throat.    Eyes: Negative for visual disturbance.   Respiratory: Positive for cough and shortness of breath.    Cardiovascular: Negative for chest pain and leg swelling.   Gastrointestinal: Negative for abdominal pain, constipation, diarrhea and vomiting.   Genitourinary: Negative for difficulty urinating, dysuria and hematuria.   Musculoskeletal: Negative for arthralgias and back pain.   Neurological: Positive for numbness. Negative for dizziness and weakness.     Objective:     Vital Signs (Most Recent):  Temp: 96.5 °F (35.8 °C) (04/07/18 1137)  Pulse: 108 (04/07/18 1137)  Resp: 18 (04/07/18 1137)  BP: (!) 88/52 (04/07/18 1137)  SpO2: 95 % (04/07/18 1137) Vital Signs (24h Range):  Temp:  [96.5 °F (35.8 °C)-99.1 °F (37.3 °C)] 96.5 °F (35.8 °C)  Pulse:  [108-153] 108  Resp:  [18-60] 18  SpO2:  [88 %-100 %] 95 %  BP: ()/(49-54) 88/52     Weight: 57.6 kg (127 lb)  Body mass index is 24 kg/m².    Physical Exam   Constitutional: She appears well-developed and well-nourished. No distress.   HENT:   Head: Normocephalic and atraumatic.   Mouth/Throat: Oropharynx is clear and moist. No oropharyngeal exudate.   Eyes: EOM are normal. Pupils are equal, round, and  reactive to light.   Neck: Normal range of motion. Neck supple. No thyromegaly present.   Cardiovascular: Normal rate, regular rhythm, normal heart sounds and intact distal pulses.    Pulmonary/Chest: Effort normal. No respiratory distress. She has wheezes.   Rhonchus breath sounds heard increased at lung bases.  O2 place by nasal cannula.   Abdominal: Soft. Bowel sounds are normal. She exhibits no distension and no mass. There is no tenderness.   Musculoskeletal: She exhibits no edema.   Lymphadenopathy:     She has no cervical adenopathy.   Neurological: She is alert.   Skin: Skin is warm. No rash noted. No erythema.   Psychiatric: She has a normal mood and affect. Her behavior is normal.   Vitals reviewed.        CRANIAL NERVES     CN III, IV, VI   Pupils are equal, round, and reactive to light.  Extraocular motions are normal.        Significant Labs:   Blood Culture: No results for input(s): LABBLOO in the last 48 hours.  BMP:   Recent Labs  Lab 04/07/18  0546   GLU 89      K 3.3*      CO2 24   BUN 30*   CREATININE 0.9   CALCIUM 8.9     CBC:   Recent Labs  Lab 04/07/18  0546   WBC 25.10*   HGB 8.5*   HCT 26.0*   PLT 68*     CMP:   Recent Labs  Lab 04/07/18  0546      K 3.3*      CO2 24   GLU 89   BUN 30*   CREATININE 0.9   CALCIUM 8.9   PROT 6.7   ALBUMIN 2.1*   BILITOT 1.2*   ALKPHOS 249*   AST 21   ALT 9*   ANIONGAP 16   EGFRNONAA >60     POCT Glucose: No results for input(s): POCTGLUCOSE in the last 48 hours.  Urine Culture: No results for input(s): LABURIN in the last 48 hours.  Urine Studies: No results for input(s): COLORU, APPEARANCEUA, PHUR, SPECGRAV, PROTEINUA, GLUCUA, KETONESU, BILIRUBINUA, OCCULTUA, NITRITE, UROBILINOGEN, LEUKOCYTESUR, RBCUA, WBCUA, BACTERIA, SQUAMEPITHEL, HYALINECASTS in the last 48 hours.    Invalid input(s): WRIGHTSUR    Significant Imaging: I have reviewed all pertinent imaging results/findings within the past 24 hours.

## 2018-04-07 NOTE — ASSESSMENT & PLAN NOTE
H&H 8.5/26.0  Continue to monitor with serial CBCs  No indication for transfusion at this time without symptoms of anemia/H&H less than 7.0/21.

## 2018-04-07 NOTE — NURSING
Notified Dr. Lemos of pt's bp of 88/40 and pt having a headache and feeling dizzy. Started 500cc bolus of NS per order; will recheck bp after bolus and repeat if needed as ordered.

## 2018-04-07 NOTE — PLAN OF CARE
04/07/18 1511   Patient Assessment/Suction   Level of Consciousness (AVPU) alert   Expansion/Accessory Muscles/Retractions no retractions;no use of accessory muscles   All Lung Fields Breath Sounds crackles coarse   Rhythm/Pattern, Respiratory pattern regular   Cough Type good;nonproductive   PRE-TX-O2-ETCO2   O2 Device (Oxygen Therapy) nasal cannula   $ Is the patient on Low Flow Oxygen? Yes   Oxygen Concentration (%) 2   Oxygen Analyzed Concentration (%) 28 %   SpO2 95 %   Pulse Oximetry Type Intermittent   Pulse 95   Resp 16   Aerosol Therapy   $ Aerosol Therapy Charges Aerosol Treatment   Respiratory Treatment Status given   SVN/Inhaler Treatment Route mask   Position During Treatment Sitting in bed   Patient Tolerance good   Post-Treatment   Post-treatment Heart Rate (beats/min) 98   Post-treatment Resp Rate (breaths/min) 16   All Fields Breath Sounds aeration increased   Ready to Wean/Extubation Screen   FIO2<=50 (chart decimal) 0.02

## 2018-04-07 NOTE — NURSING
Notified Dr. Lemos of pt's bp 86/54 and lactic acid 3.3. Giving another 500cc bolus of NS per order.

## 2018-04-07 NOTE — ASSESSMENT & PLAN NOTE
Treat with Rocephin/azithromycin.  Continue oxygen supplementation by nasal cannula.  Currently no respiratory distress.  Check ABGs if hypoxemia develops/shortness of breath does not improve on current therapy.  Schedule duo nebs for concerns of pneumonia or exacerbating COPD.  Check d-dimer.

## 2018-04-07 NOTE — ED NOTES
Pt resting well in bed awake and alert.  Family at bedside.  No acute distress noted.  Pt admits feeling much better

## 2018-04-07 NOTE — HPI
History was obtained from the patient who is an adequate historian. Ms. 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  Symptoms have been aggravated due to a cough that the patient has recently developed issues with.  Patient has had a productive cough of whitish sputum for a couple of months.  Patient undergoes chemotherapy for right lung are [place] and last had chemotherapy on March 13, 2018.  She is followed by Dr. Ceballos with heme/oncology for this condition.  Currently, patient has other respiratory issues including COPD and pulmonary fibrosis and is on chronic oxygen therapy by nasal cannula for this.  She does report subjective fevers/chills over the last 24 hours.  Her shortness of breath is aggravated with activity and cough.  She has not taken any medications for her cough.

## 2018-04-07 NOTE — H&P
[facility] Hospital Medicine  History & Physical    Patient Name: Delicia Murray  MRN: [id number]  Admission Date: 4/7/2018  Attending Physician: Hao Lemos MD  Primary Care Provider: Mono Duncan MD         Patient information was obtained from patient and ER records.     Subjective:     Principal Problem:Sepsis    Chief Complaint:   Chief Complaint   Patient presents with    Shortness of Breath      x 2 1/2 hours        HPI: History was obtained from the patient who is an adequate historian. Ms. 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  Symptoms have been aggravated due to a cough that the patient has recently developed issues with.  Patient has had a productive cough of whitish sputum for a couple of months.  Patient undergoes chemotherapy for right lung are [place] and last had chemotherapy on March 13, 2018.  She is followed by Dr. Ceballos with heme/oncology for this condition.  Currently, patient has other respiratory issues including COPD and pulmonary fibrosis and is on chronic oxygen therapy by nasal cannula for this.  She does report subjective fevers/chills over the last 24 hours.  Her shortness of breath is aggravated with activity and cough.  She has not taken any medications for her cough.    Past Medical History:   Diagnosis Date    Anemia     Blood transfusion     Cancer     lung cancer    Colon polyps     COPD (chronic obstructive pulmonary disease)     Depression     Insomnia     Vertigo        Past Surgical History:   Procedure Laterality Date    broken jaw      left    CHOLECYSTECTOMY      HYSTERECTOMY      radiation       SKIN BIOPSY      TONSILLECTOMY         Review of patient's allergies indicates:   Allergen Reactions    Ferrlecit [sodium ferric gluconat-sucrose]      Generalized rash, pruritus    Sulfa (sulfonamide antibiotics)     Adhesive Rash       Current Facility-Administered Medications on File Prior to Encounter   Medication    0.9%  NaCl infusion    ferric carboxymaltose (INJECTAFER) 750 mg in sodium chloride 0.9% 250 mL infusion    ferric gluconate (FERRLECIT) 125 mg in sodium chloride 0.9% 100 mL IVPB    heparin, porcine (PF) 100 unit/mL injection flush 500 Units    sodium chloride 0.9% flush 10 mL    sodium chloride 0.9% flush 10 mL     Current Outpatient Prescriptions on File Prior to Encounter   Medication Sig    acetaminophen (TYLENOL) 325 MG tablet Take 325 mg by mouth every 6 (six) hours as needed.    albuterol 90 mcg/actuation inhaler 2 puffs every 4 hours as needed for cough, wheeze, or shortness of breath    ALPRAZolam (XANAX) 0.25 MG tablet TAKE 1 TABLET BY MOUTH THREE TIMES DAILY AS NEEDED    hydrocodone-acetaminophen 5-325mg (NORCO) 5-325 mg per tablet Take 1 tablet by mouth every 4 (four) hours as needed for Pain.    lidocaine-prilocaine (EMLA) cream Apply to affected area once    mirtazapine (REMERON) 30 MG tablet TAKE 1 TABLET(30 MG) BY MOUTH EVERY EVENING    ondansetron (ZOFRAN-ODT) 8 MG TbDL Take 1 tablet (8 mg total) by mouth every 12 (twelve) hours as needed.    pantoprazole (PROTONIX) 40 MG tablet TAKE 1 TABLET(40 MG) BY MOUTH TWICE DAILY    prochlorperazine (COMPAZINE) 10 MG tablet TAKE 1 TABLET BY MOUTH EVERY 6 HOURS    sertraline (ZOLOFT) 100 MG tablet Take 1 tablet (100 mg total) by mouth every evening.    umeclidinium-vilanterol (ANORO ELLIPTA) 62.5-25 mcg/actuation DsDv Inhale 1 puff into the lungs once daily.    [DISCONTINUED] pantoprazole (PROTONIX) 40 MG tablet Take 1 tablet (40 mg total) by mouth once daily.     Family History     Problem Relation (Age of Onset)     Cancer Father (69)    Heart disease Mother        Social History Main Topics    Smoking status: Former Smoker     Packs/day: 0.50     Years: 60.00    Smokeless tobacco: Former User     Quit date: 10/3/2016    Alcohol use Yes      Comment: occasional    Drug use: No    Sexual activity: Not Currently     Review of Systems   Constitutional: Positive for chills and fever.   HENT: Negative for sneezing and sore throat.    Eyes: Negative for visual disturbance.   Respiratory: Positive for cough and shortness of breath.    Cardiovascular: Negative for chest pain and leg swelling.   Gastrointestinal: Negative for abdominal pain, constipation, diarrhea and vomiting.   Genitourinary: Negative for difficulty urinating, dysuria and hematuria.   Musculoskeletal: Negative for arthralgias and back pain.   Neurological: Positive for numbness. Negative for dizziness and weakness.     Objective:     Vital Signs (Most Recent):  Temp: 96.5 °F (35.8 °C) (04/07/18 1137)  Pulse: 108 (04/07/18 1137)  Resp: 18 (04/07/18 1137)  BP: (!) 88/52 (04/07/18 1137)  SpO2: 95 % (04/07/18 1137) Vital Signs (24h Range):  Temp:  [96.5 °F (35.8 °C)-99.1 °F (37.3 °C)] 96.5 °F (35.8 °C)  Pulse:  [108-153] 108  Resp:  [18-60] 18  SpO2:  [88 %-100 %] 95 %  BP: ()/(49-54) 88/52     Weight: 57.6 kg (127 lb)  Body mass index is 24 kg/m².    Physical Exam   Constitutional: She appears well-developed and well-nourished. No distress.   HENT:   Head: Normocephalic and atraumatic.   Mouth/Throat: Oropharynx is clear and moist. No oropharyngeal exudate.   Eyes: EOM are normal. Pupils are equal, round, and reactive to light.   Neck: Normal range of motion. Neck supple. No thyromegaly present.   Cardiovascular: Normal rate, regular rhythm, normal heart sounds and intact distal pulses.    Pulmonary/Chest: Effort normal. No respiratory distress. She has wheezes.   Rhonchus breath sounds heard increased at lung bases.  O2 place by nasal cannula.   Abdominal:  Soft. Bowel sounds are normal. She exhibits no distension and no mass. There is no tenderness.   Musculoskeletal: She exhibits no edema.   Lymphadenopathy:     She has no cervical adenopathy.   Neurological: She is alert.   Skin: Skin is warm. No rash noted. No erythema.   Psychiatric: She has a normal mood and affect. Her behavior is normal.   Vitals reviewed.        CRANIAL NERVES     CN III, IV, VI   Pupils are equal, round, and reactive to light.  Extraocular motions are normal.        Significant Labs:   Blood Culture: No results for input(s): LABBLOO in the last 48 hours.  BMP:   Recent Labs  Lab 04/07/18  0546   GLU 89      K 3.3*      CO2 24   BUN 30*   CREATININE 0.9   CALCIUM 8.9     CBC:   Recent Labs  Lab 04/07/18  0546   WBC 25.10*   HGB 8.5*   HCT 26.0*   PLT 68*     CMP:   Recent Labs  Lab 04/07/18  0546      K 3.3*      CO2 24   GLU 89   BUN 30*   CREATININE 0.9   CALCIUM 8.9   PROT 6.7   ALBUMIN 2.1*   BILITOT 1.2*   ALKPHOS 249*   AST 21   ALT 9*   ANIONGAP 16   EGFRNONAA >60     POCT Glucose: No results for input(s): POCTGLUCOSE in the last 48 hours.  Urine Culture: No results for input(s): LABURIN in the last 48 hours.  Urine Studies: No results for input(s): COLORU, APPEARANCEUA, PHUR, SPECGRAV, PROTEINUA, GLUCUA, KETONESU, BILIRUBINUA, OCCULTUA, NITRITE, UROBILINOGEN, LEUKOCYTESUR, RBCUA, WBCUA, BACTERIA, SQUAMEPITHEL, HYALINECASTS in the last 48 hours.    Invalid input(s): WRIGHTSUR    Significant Imaging: I have reviewed all pertinent imaging results/findings within the past 24 hours.    Assessment/Plan:     * Sepsis      Secondary due to community-acquired pneumonia.  Pro-calcitonin 5.05.  Will obtain sedimentation rate and CRP.  Curb 65 score is 2 which puts patient at moderate risk group.  Will admit to inpatient status.        CAP (community acquired pneumonia)    Treat with Rocephin/azithromycin.  Continue oxygen supplementation by nasal cannula.  Currently no  respiratory distress.  Check ABGs if hypoxemia develops/shortness of breath does not improve on current therapy.  Schedule duo nebs for concerns of pneumonia or exacerbating COPD.  Check d-dimer.        COPD (chronic obstructive pulmonary disease)    Treatment as above.          Malignant neoplasm of upper lobe of right lung              SARAH (iron deficiency anemia)    H&H 8.5/26.0  Continue to monitor with serial CBCs  No indication for transfusion at this time without symptoms of anemia/H&H less than 7.0/21.        Depression    Continue home meds.          VTE Risk Mitigation         Ordered     enoxaparin injection 40 mg  Daily     Route:  Subcutaneous        04/07/18 0949     IP VTE HIGH RISK PATIENT  Once      04/07/18 0949             Hao Lemos MD  Department of Hospital Medicine   [facility]

## 2018-04-08 DIAGNOSIS — F32.89 OTHER DEPRESSION: ICD-10-CM

## 2018-04-08 LAB
ABO + RH BLD: NORMAL
ANION GAP SERPL CALC-SCNC: 7 MMOL/L
BASOPHILS # BLD AUTO: 0 K/UL
BASOPHILS NFR BLD: 0 %
BLD GP AB SCN CELLS X3 SERPL QL: NORMAL
BLD PROD TYP BPU: NORMAL
BLD PROD TYP BPU: NORMAL
BLOOD UNIT EXPIRATION DATE: NORMAL
BLOOD UNIT EXPIRATION DATE: NORMAL
BLOOD UNIT TYPE CODE: 6200
BLOOD UNIT TYPE CODE: 6200
BLOOD UNIT TYPE: NORMAL
BLOOD UNIT TYPE: NORMAL
BUN SERPL-MCNC: 32 MG/DL
CALCIUM SERPL-MCNC: 8.9 MG/DL
CHLORIDE SERPL-SCNC: 106 MMOL/L
CO2 SERPL-SCNC: 29 MMOL/L
CODING SYSTEM: NORMAL
CODING SYSTEM: NORMAL
CREAT SERPL-MCNC: 0.6 MG/DL
DIFFERENTIAL METHOD: ABNORMAL
DISPENSE STATUS: NORMAL
DISPENSE STATUS: NORMAL
EOSINOPHIL # BLD AUTO: 0 K/UL
EOSINOPHIL NFR BLD: 0 %
ERYTHROCYTE [DISTWIDTH] IN BLOOD BY AUTOMATED COUNT: 18.1 %
EST. GFR  (AFRICAN AMERICAN): >60 ML/MIN/1.73 M^2
EST. GFR  (NON AFRICAN AMERICAN): >60 ML/MIN/1.73 M^2
ESTIMATED AVG GLUCOSE: 94 MG/DL
GLUCOSE SERPL-MCNC: 124 MG/DL
HBA1C MFR BLD HPLC: 4.9 %
HCT VFR BLD AUTO: 20.5 %
HGB BLD-MCNC: 6.6 G/DL
LACTATE SERPL-SCNC: 0.9 MMOL/L
LYMPHOCYTES # BLD AUTO: 0.6 K/UL
LYMPHOCYTES NFR BLD: 4.6 %
MAGNESIUM SERPL-MCNC: 1.2 MG/DL
MCH RBC QN AUTO: 29.5 PG
MCHC RBC AUTO-ENTMCNC: 32 G/DL
MCV RBC AUTO: 92 FL
MONOCYTES # BLD AUTO: 0.6 K/UL
MONOCYTES NFR BLD: 4.8 %
NEUTROPHILS # BLD AUTO: 12.3 K/UL
NEUTROPHILS NFR BLD: 90.6 %
NUM UNITS TRANS PACKED RBC: NORMAL
NUM UNITS TRANS PACKED RBC: NORMAL
PHOSPHATE SERPL-MCNC: 3 MG/DL
PLATELET # BLD AUTO: 40 K/UL
PLATELET BLD QL SMEAR: ABNORMAL
PMV BLD AUTO: 8.1 FL
POTASSIUM SERPL-SCNC: 4.1 MMOL/L
RBC # BLD AUTO: 2.22 M/UL
SODIUM SERPL-SCNC: 142 MMOL/L
TROPONIN I SERPL DL<=0.01 NG/ML-MCNC: 0.22 NG/ML
WBC # BLD AUTO: 13.6 K/UL

## 2018-04-08 PROCEDURE — 25500020 PHARM REV CODE 255

## 2018-04-08 PROCEDURE — 63600175 PHARM REV CODE 636 W HCPCS: Performed by: FAMILY MEDICINE

## 2018-04-08 PROCEDURE — 85025 COMPLETE CBC W/AUTO DIFF WBC: CPT

## 2018-04-08 PROCEDURE — 25000003 PHARM REV CODE 250: Performed by: FAMILY MEDICINE

## 2018-04-08 PROCEDURE — 11000001 HC ACUTE MED/SURG PRIVATE ROOM

## 2018-04-08 PROCEDURE — 86920 COMPATIBILITY TEST SPIN: CPT

## 2018-04-08 PROCEDURE — 84484 ASSAY OF TROPONIN QUANT: CPT

## 2018-04-08 PROCEDURE — 25000242 PHARM REV CODE 250 ALT 637 W/ HCPCS: Performed by: FAMILY MEDICINE

## 2018-04-08 PROCEDURE — 36415 COLL VENOUS BLD VENIPUNCTURE: CPT

## 2018-04-08 PROCEDURE — 25000003 PHARM REV CODE 250: Performed by: EMERGENCY MEDICINE

## 2018-04-08 PROCEDURE — 27000221 HC OXYGEN, UP TO 24 HOURS

## 2018-04-08 PROCEDURE — 93005 ELECTROCARDIOGRAM TRACING: CPT

## 2018-04-08 PROCEDURE — 83605 ASSAY OF LACTIC ACID: CPT

## 2018-04-08 PROCEDURE — 83735 ASSAY OF MAGNESIUM: CPT

## 2018-04-08 PROCEDURE — 80048 BASIC METABOLIC PNL TOTAL CA: CPT

## 2018-04-08 PROCEDURE — 94640 AIRWAY INHALATION TREATMENT: CPT

## 2018-04-08 PROCEDURE — 94761 N-INVAS EAR/PLS OXIMETRY MLT: CPT

## 2018-04-08 PROCEDURE — 86850 RBC ANTIBODY SCREEN: CPT

## 2018-04-08 PROCEDURE — P9016 RBC LEUKOCYTES REDUCED: HCPCS

## 2018-04-08 PROCEDURE — 63600175 PHARM REV CODE 636 W HCPCS: Performed by: NURSE PRACTITIONER

## 2018-04-08 PROCEDURE — 36430 TRANSFUSION BLD/BLD COMPNT: CPT

## 2018-04-08 PROCEDURE — 84100 ASSAY OF PHOSPHORUS: CPT

## 2018-04-08 PROCEDURE — 63600175 PHARM REV CODE 636 W HCPCS: Performed by: EMERGENCY MEDICINE

## 2018-04-08 RX ORDER — HYDROCODONE BITARTRATE AND ACETAMINOPHEN 500; 5 MG/1; MG/1
TABLET ORAL
Status: DISCONTINUED | OUTPATIENT
Start: 2018-04-08 | End: 2018-04-10

## 2018-04-08 RX ORDER — FUROSEMIDE 10 MG/ML
40 INJECTION INTRAMUSCULAR; INTRAVENOUS ONCE
Status: COMPLETED | OUTPATIENT
Start: 2018-04-08 | End: 2018-04-08

## 2018-04-08 RX ORDER — FUROSEMIDE 10 MG/ML
INJECTION INTRAMUSCULAR; INTRAVENOUS
Status: DISPENSED
Start: 2018-04-08 | End: 2018-04-09

## 2018-04-08 RX ORDER — SERTRALINE HYDROCHLORIDE 100 MG/1
TABLET, FILM COATED ORAL
Qty: 90 TABLET | Refills: 0 | Status: SHIPPED | OUTPATIENT
Start: 2018-04-08

## 2018-04-08 RX ORDER — SODIUM CHLORIDE 0.9 % (FLUSH) 0.9 %
5 SYRINGE (ML) INJECTION
Status: DISCONTINUED | OUTPATIENT
Start: 2018-04-08 | End: 2018-04-13 | Stop reason: HOSPADM

## 2018-04-08 RX ORDER — LORAZEPAM 0.5 MG/1
0.25 TABLET ORAL EVERY 8 HOURS PRN
Status: DISCONTINUED | OUTPATIENT
Start: 2018-04-08 | End: 2018-04-08

## 2018-04-08 RX ADMIN — HYDROCODONE BITARTRATE AND ACETAMINOPHEN 1 TABLET: 5; 325 TABLET ORAL at 07:04

## 2018-04-08 RX ADMIN — ENOXAPARIN SODIUM 40 MG: 100 INJECTION SUBCUTANEOUS at 11:04

## 2018-04-08 RX ADMIN — SERTRALINE HYDROCHLORIDE 100 MG: 50 TABLET ORAL at 09:04

## 2018-04-08 RX ADMIN — IPRATROPIUM BROMIDE AND ALBUTEROL SULFATE 3 ML: .5; 3 SOLUTION RESPIRATORY (INHALATION) at 07:04

## 2018-04-08 RX ADMIN — PANTOPRAZOLE SODIUM 40 MG: 40 TABLET, DELAYED RELEASE ORAL at 08:04

## 2018-04-08 RX ADMIN — IPRATROPIUM BROMIDE AND ALBUTEROL SULFATE 3 ML: .5; 3 SOLUTION RESPIRATORY (INHALATION) at 03:04

## 2018-04-08 RX ADMIN — IPRATROPIUM BROMIDE AND ALBUTEROL SULFATE 3 ML: .5; 3 SOLUTION RESPIRATORY (INHALATION) at 12:04

## 2018-04-08 RX ADMIN — IOHEXOL 100 ML: 350 INJECTION, SOLUTION INTRAVENOUS at 06:04

## 2018-04-08 RX ADMIN — IPRATROPIUM BROMIDE AND ALBUTEROL SULFATE 3 ML: .5; 3 SOLUTION RESPIRATORY (INHALATION) at 11:04

## 2018-04-08 RX ADMIN — ACETAMINOPHEN 650 MG: 325 TABLET ORAL at 11:04

## 2018-04-08 RX ADMIN — FUROSEMIDE 40 MG: 10 INJECTION, SOLUTION INTRAVENOUS at 08:04

## 2018-04-08 RX ADMIN — ALPRAZOLAM 0.25 MG: 0.25 TABLET ORAL at 09:04

## 2018-04-08 RX ADMIN — HYDROCODONE BITARTRATE AND ACETAMINOPHEN 1 TABLET: 5; 325 TABLET ORAL at 08:04

## 2018-04-08 RX ADMIN — ALPRAZOLAM 0.25 MG: 0.25 TABLET ORAL at 12:04

## 2018-04-08 RX ADMIN — CEFTRIAXONE 1 G: 1 INJECTION, SOLUTION INTRAVENOUS at 05:04

## 2018-04-08 RX ADMIN — MIRTAZAPINE 30 MG: 15 TABLET, FILM COATED ORAL at 09:04

## 2018-04-08 RX ADMIN — AZITHROMYCIN MONOHYDRATE 500 MG: 500 INJECTION, POWDER, LYOPHILIZED, FOR SOLUTION INTRAVENOUS at 07:04

## 2018-04-08 RX ADMIN — IPRATROPIUM BROMIDE AND ALBUTEROL SULFATE 3 ML: .5; 3 SOLUTION RESPIRATORY (INHALATION) at 08:04

## 2018-04-08 NOTE — PLAN OF CARE
SW met w/ pt and dtr Kelly @ [facility] for assessment.  Pt undergoing chemo for lung cancer, lives alone.  Pt fell at home last Sat, was d/c from the ED but has had difficulty ambulating at home since then.  Her PCP ordered HH w/ [facility] since her d/c from the ED last week. Hospital Bed and wheelchair was ordered through HH this past Thursday.   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  Pt wants to d/c home w/ HH. Dtr explained that pt's level of need may exceed what she and her siblings can provide as they also work on [employer] and pt would be alone during the day.   Pt has Living Will, denies having medical POA.  Pt signed Pt Choice Disclosure for [facility] in event ordered to resume at d/c.         04/08/18 1144   Discharge Assessment   Assessment Type Discharge Planning Assessment   Confirmed/corrected address and phone number on facesheet? Yes   Assessment information obtained from? Patient;Caregiver  (w/ dtr Kelly Vallejo @ bedside)   Prior to hospitilization cognitive status: Alert/Oriented   Prior to hospitalization functional status: Needs Assistance   Current cognitive status: Alert/Oriented   Current Functional Status: Needs Assistance   Facility Arrived From: home w/ Home Health   Lives With alone   Able to Return to Prior Arrangements yes   Is patient able to care for self after discharge? Unable to determine at this time (comments)   Who are your caregiver(s) and their phone number(s)? daughter: Kelly Vallejo  [phone]   dtr: Kerrie Olvera [phone]    dtr: Alyssa Trammell [phone]   Patient's perception of discharge disposition home health   Readmission Within The Last 30 Days no previous admission in last 30 days   Patient currently being followed by outpatient case management? No   Patient currently receives any other outside agency services? Yes   Name and contact number of  agency or person providing outside services [facility]   Is it the patient/care giver preference to resume care with the current outside agency? Yes   Equipment Currently Used at Home wheelchair;bedside commode  (Ordered via Home Health)   Do you have any problems affording any of your prescribed medications? No   Is the patient taking medications as prescribed? yes   Does the patient have transportation home? Yes   Transportation Available family or friend will provide   Discharge Plan A Home Health   Discharge Plan B Home Health   Patient/Family In Agreement With Plan yes

## 2018-04-08 NOTE — NURSING
Received call from Renetta in Lab @ [facility] to report a positive blood culture with Gram + cocci in chains resembling Staph. This nurse notified Md, no new orders at this time.

## 2018-04-08 NOTE — PLAN OF CARE
04/07/18 1947   Patient Assessment/Suction   Level of Consciousness (AVPU) alert   Respiratory Effort Unlabored   All Lung Fields Breath Sounds coarse   PRE-TX-O2-ETCO2   O2 Device (Oxygen Therapy) nasal cannula   $ Is the patient on Low Flow Oxygen? Yes   Oxygen Concentration (%) 3   SpO2 96 %   Pulse Oximetry Type Intermittent   $ Pulse Oximetry - Multiple Charge Pulse Oximetry - Multiple   Pulse 97   Resp 19   Aerosol Therapy   $ Aerosol Therapy Charges Aerosol Treatment   Respiratory Treatment Status given   SVN/Inhaler Treatment Route with oxygen;mask   Position During Treatment HOB at 30 degrees   Patient Tolerance good   Post-Treatment   Post-treatment Heart Rate (beats/min) 97   Post-treatment Resp Rate (breaths/min) 18   All Fields Breath Sounds aeration increased   Ready to Wean/Extubation Screen   FIO2<=50 (chart decimal) 0.03

## 2018-04-08 NOTE — NURSING
Pt daughter came out of pt room to tell this nurse that pt is feeling SOB. This nurse went to assess pt. Stopped blood transfusion for time being and took V.S. Resp noted to be elevated @ 28 and O2 sats @ 93%, all other V.S. WNL. Lung sounds in lower lung fields sound coarse and diminished. Notified MD of findings and pt's symptoms. MD went in to assess pt and informed this nurse of new orders of CXR, ABG's, EKG, and Troponin and to restart blood transfusion r/t pt really needing the blood at this time. This nurse restarted transfusion and ensured pt and daughters x2 at bedside were aware of new orders MD ordered.

## 2018-04-08 NOTE — PROGRESS NOTES
Progress Note    Admit Date: 4/7/2018   LOS: 1 day     SUBJECTIVE:     Interval history: Patient is without complaints.  Denies chest pain/shortness breathing.    Scheduled Meds:   albuterol-ipratropium 2.5mg-0.5mg/3mL  3 mL Nebulization Q4H    azithromycin  500 mg Intravenous Q24H    cefTRIAXone 1 g in dextrose 5 % 50 mL  1 g Intravenous Q24H    enoxaparin  40 mg Subcutaneous Daily    mirtazapine  30 mg Oral QHS    pantoprazole  40 mg Oral Daily    sertraline  100 mg Oral QHS     Continuous Infusions:   sodium chloride 0.9% 150 mL/hr at 04/07/18 2230     PRN Meds:sodium chloride, acetaminophen, acetaminophen, ALPRAZolam, dextrose 50%, dextrose 50%, glucagon (human recombinant), glucose, glucose, hydrocodone-acetaminophen 5-325mg, Lorazepam 0.25 mg, magnesium oxide, ondansetron, potassium chloride 10%, potassium, sodium phosphates, sodium chloride 0.9%, sodium chloride 0.9%    Review of patient's allergies indicates:   Allergen Reactions    Ferrlecit [sodium ferric gluconat-sucrose]      Generalized rash, pruritus    Sulfa (sulfonamide antibiotics)     Adhesive Rash       Review of Systems  Denies shortness of breath/chest pain.    OBJECTIVE:     Vital Signs (Most Recent)  Temp: 97.8 °F (36.6 °C) (04/08/18 1708)  Pulse: 105 (04/08/18 1708)  Resp: (!) 24 (04/08/18 1708)  BP: (!) 128/59 (04/08/18 1708)  SpO2: (!) 92 % (04/08/18 1708)    Vital Signs Range (Last 24H):  Temp:  [96.3 °F (35.7 °C)-98.8 °F (37.1 °C)]   Pulse:  []   Resp:  [14-28]   BP: ()/(50-59)   SpO2:  [92 %-98 %]     I & O (Last 24H):  Intake/Output Summary (Last 24 hours) at 04/08/18 1748  Last data filed at 04/08/18 0600   Gross per 24 hour   Intake             1515 ml   Output              100 ml   Net             1415 ml     Physical Exam:  Constitutional: She appears well-developed and well-nourished. No distress.    Eyes: EOM are normal. Pupils are equal, round, and reactive to light.   Neck: Normal range of motion. Neck  supple. No thyromegaly present.   Cardiovascular: Normal rate, regular rhythm, normal heart sounds and intact distal pulses.    Pulmonary/Chest: Effort normal. No respiratory distress  O2 place by nasal cannula.   Abdominal: Soft. Bowel sounds are normal. She exhibits no distension and no mass. There is no tenderness.   Musculoskeletal: She exhibits no edema.     Laboratory:  CBC:   Recent Labs  Lab 04/08/18 0426   WBC 13.60*   RBC 2.22*   HGB 6.6*   HCT 20.5*   PLT 40*   MCV 92   MCH 29.5   MCHC 32.0     BMP:   Recent Labs  Lab 04/08/18 0426   *      K 4.1      CO2 29   BUN 32*   CREATININE 0.6   CALCIUM 8.9   MG 1.2*     CMP:   Recent Labs  Lab 04/07/18  0546 04/08/18 0426   GLU 89 124*   CALCIUM 8.9 8.9   ALBUMIN 2.1*  --    PROT 6.7  --     142   K 3.3* 4.1   CO2 24 29    106   BUN 30* 32*   CREATININE 0.9 0.6   ALKPHOS 249*  --    ALT 9*  --    AST 21  --    BILITOT 1.2*  --          ASSESSMENT/PLAN:     Sepsis (mild)      Secondary due to community-acquired pneumonia.  Lactate trending down.  No indication for escalation of antibiotic therapy at this time.        CAP (community acquired pneumonia)     Treat with Rocephin/azithromycin.  Continue oxygen supplementation by nasal cannula.  Currently no respiratory distress.  Schedule duo nebs for concerns of pneumonia or exacerbating COPD.         COPD (chronic obstructive pulmonary disease)     Treatment as above.       Elevated d-dimer       Well's criteria suggest 16.2% chance PE with history of malignancy and symptoms.   Obtain CT a chest PE protocol.         SARAH (iron deficiency anemia)     H&H 6.6/20.5  Transfuse 2 units packed red cells after type and screen.  Obtain posttransfusion H&H.         Depression     Continue home meds.                   VTE Risk Mitigation          Ordered       enoxaparin injection 40 mg  Daily     Route:  Subcutaneous        04/07/18 0949       IP VTE HIGH RISK PATIENT  Once      04/07/18 0949          Portions of this note were created using Dragon voice recognition software. There may be voice recognition errors found in the text, and attempts were made to correct these errors prior to signature    Hao Lemos MD    Family Medicine  4/8/2018

## 2018-04-08 NOTE — NURSING
Pt lost iv assess. Patient requesting to have port assessed considering she was stuck multiple times for previous IV. Arianna Salazar NP . New orders given.

## 2018-04-08 NOTE — PLAN OF CARE
Problem: Patient Care Overview  Goal: Plan of Care Review  Outcome: Ongoing (interventions implemented as appropriate)  Patient alert and oriented resting in bed. NAD. Denies pain or SOB. VSS.   Plan of care reviewed with patient. Verbalizes understanding.Call light in reach.   Pt free from fall or injury. Will monitor.

## 2018-04-08 NOTE — PLAN OF CARE
Problem: Patient Care Overview  Goal: Plan of Care Review  Outcome: Ongoing (interventions implemented as appropriate)  Pt refused ABG, Dr. Lemos notified. ABG order d/c'd at Dr. Lemos's request.

## 2018-04-08 NOTE — PROGRESS NOTES
M.D. notified that patient developed shortness of breath approximately 30 minutes after starting packed red blood cell transfusion.  M.D. at bedside as patient with complaint of minimal shortness of breath.  Lung exams with some type use coarseness however unlabored breathing.  Stat labs obtained with noted elevation to troponin level.  Patient declined arterial stick for ABG.  Chest x-ray unrevealing.  EKG with no significant change.  Patient is to have CTA (stat) for evaluation of elevation of d-dimer and suspected PE.

## 2018-04-08 NOTE — NURSING
Dr. Lemos notified of  6.6/ 20.5. New orders to type and screen, 2 units PRBC, post CBC given and implemented.

## 2018-04-08 NOTE — PLAN OF CARE
04/08/18 0802   Patient Assessment/Suction   Level of Consciousness (AVPU) alert   Respiratory Effort Normal;Unlabored   All Lung Fields Breath Sounds coarse   PRE-TX-O2-ETCO2   O2 Device (Oxygen Therapy) nasal cannula   $ Is the patient on Low Flow Oxygen? Yes   Oxygen Concentration (%) 3   Oxygen Analyzed Concentration (%) 32 %   SpO2 96 %   Pulse Oximetry Type Intermittent   $ Pulse Oximetry - Multiple Charge Pulse Oximetry - Multiple   Pulse 96   Resp 20   Aerosol Therapy   $ Aerosol Therapy Charges Aerosol Treatment   Respiratory Treatment Status given   SVN/Inhaler Treatment Route mask   Position During Treatment HOB at 30 degrees   Patient Tolerance good   Post-Treatment   Post-treatment Heart Rate (beats/min) 102   Post-treatment Resp Rate (breaths/min) 20   All Fields Breath Sounds coarse   Ready to Wean/Extubation Screen   FIO2<=50 (chart decimal) 0.03

## 2018-04-09 ENCOUNTER — TELEPHONE (OUTPATIENT)
Dept: HEMATOLOGY/ONCOLOGY | Facility: CLINIC | Age: 78
End: 2018-04-09

## 2018-04-09 PROBLEM — E43 SEVERE MALNUTRITION: Status: ACTIVE | Noted: 2018-04-07

## 2018-04-09 PROBLEM — J96.21 ACUTE ON CHRONIC RESPIRATORY FAILURE WITH HYPOXIA: Status: ACTIVE | Noted: 2018-04-07

## 2018-04-09 PROBLEM — S32.9XXA CLOSED PELVIC FRACTURE: Status: ACTIVE | Noted: 2018-04-09

## 2018-04-09 LAB
ANION GAP SERPL CALC-SCNC: 12 MMOL/L
BACTERIA UR CULT: NORMAL
BASOPHILS # BLD AUTO: 0.1 K/UL
BASOPHILS NFR BLD: 0.4 %
BUN SERPL-MCNC: 16 MG/DL
CALCIUM SERPL-MCNC: 8.4 MG/DL
CHLORIDE SERPL-SCNC: 101 MMOL/L
CO2 SERPL-SCNC: 32 MMOL/L
CREAT SERPL-MCNC: 0.5 MG/DL
DIFFERENTIAL METHOD: ABNORMAL
EOSINOPHIL # BLD AUTO: 0 K/UL
EOSINOPHIL NFR BLD: 0.1 %
ERYTHROCYTE [DISTWIDTH] IN BLOOD BY AUTOMATED COUNT: 18.6 %
EST. GFR  (AFRICAN AMERICAN): >60 ML/MIN/1.73 M^2
EST. GFR  (NON AFRICAN AMERICAN): >60 ML/MIN/1.73 M^2
GLUCOSE SERPL-MCNC: 96 MG/DL
HCT VFR BLD AUTO: 31 %
HGB BLD-MCNC: 10.3 G/DL
LACTATE SERPL-SCNC: 0.9 MMOL/L
LYMPHOCYTES # BLD AUTO: 0.8 K/UL
LYMPHOCYTES NFR BLD: 5.9 %
MAGNESIUM SERPL-MCNC: 1 MG/DL
MCH RBC QN AUTO: 29.5 PG
MCHC RBC AUTO-ENTMCNC: 33.3 G/DL
MCV RBC AUTO: 88 FL
MONOCYTES # BLD AUTO: 0.3 K/UL
MONOCYTES NFR BLD: 2.3 %
NEUTROPHILS # BLD AUTO: 12.9 K/UL
NEUTROPHILS NFR BLD: 91.3 %
PHOSPHATE SERPL-MCNC: 2.5 MG/DL
PLATELET # BLD AUTO: 52 K/UL
PMV BLD AUTO: 8.3 FL
POTASSIUM SERPL-SCNC: 3.1 MMOL/L
RBC # BLD AUTO: 3.5 M/UL
SODIUM SERPL-SCNC: 145 MMOL/L
TROPONIN I SERPL DL<=0.01 NG/ML-MCNC: 0.17 NG/ML
TROPONIN I SERPL DL<=0.01 NG/ML-MCNC: 0.19 NG/ML
WBC # BLD AUTO: 14.1 K/UL

## 2018-04-09 PROCEDURE — 25000003 PHARM REV CODE 250: Performed by: FAMILY MEDICINE

## 2018-04-09 PROCEDURE — 83605 ASSAY OF LACTIC ACID: CPT

## 2018-04-09 PROCEDURE — 84100 ASSAY OF PHOSPHORUS: CPT

## 2018-04-09 PROCEDURE — 80048 BASIC METABOLIC PNL TOTAL CA: CPT

## 2018-04-09 PROCEDURE — 27000221 HC OXYGEN, UP TO 24 HOURS

## 2018-04-09 PROCEDURE — 63600175 PHARM REV CODE 636 W HCPCS: Performed by: EMERGENCY MEDICINE

## 2018-04-09 PROCEDURE — 94761 N-INVAS EAR/PLS OXIMETRY MLT: CPT

## 2018-04-09 PROCEDURE — 25000003 PHARM REV CODE 250: Performed by: EMERGENCY MEDICINE

## 2018-04-09 PROCEDURE — 94640 AIRWAY INHALATION TREATMENT: CPT

## 2018-04-09 PROCEDURE — 85025 COMPLETE CBC W/AUTO DIFF WBC: CPT

## 2018-04-09 PROCEDURE — 36415 COLL VENOUS BLD VENIPUNCTURE: CPT

## 2018-04-09 PROCEDURE — 99900035 HC TECH TIME PER 15 MIN (STAT)

## 2018-04-09 PROCEDURE — 63600175 PHARM REV CODE 636 W HCPCS: Performed by: FAMILY MEDICINE

## 2018-04-09 PROCEDURE — 25000003 PHARM REV CODE 250: Performed by: HOSPITALIST

## 2018-04-09 PROCEDURE — 25000242 PHARM REV CODE 250 ALT 637 W/ HCPCS: Performed by: FAMILY MEDICINE

## 2018-04-09 PROCEDURE — 84484 ASSAY OF TROPONIN QUANT: CPT

## 2018-04-09 PROCEDURE — 11000001 HC ACUTE MED/SURG PRIVATE ROOM

## 2018-04-09 PROCEDURE — 83735 ASSAY OF MAGNESIUM: CPT

## 2018-04-09 RX ORDER — POTASSIUM CHLORIDE 20 MEQ/1
40 TABLET, EXTENDED RELEASE ORAL DAILY PRN
Status: DISCONTINUED | OUTPATIENT
Start: 2018-04-09 | End: 2018-04-13 | Stop reason: HOSPADM

## 2018-04-09 RX ORDER — SERTRALINE HYDROCHLORIDE 50 MG/1
100 TABLET, FILM COATED ORAL NIGHTLY
Status: DISCONTINUED | OUTPATIENT
Start: 2018-04-09 | End: 2018-04-13 | Stop reason: HOSPADM

## 2018-04-09 RX ORDER — NAPROXEN SODIUM 220 MG/1
81 TABLET, FILM COATED ORAL DAILY
Status: DISCONTINUED | OUTPATIENT
Start: 2018-04-09 | End: 2018-04-13 | Stop reason: HOSPADM

## 2018-04-09 RX ADMIN — ENOXAPARIN SODIUM 40 MG: 100 INJECTION SUBCUTANEOUS at 09:04

## 2018-04-09 RX ADMIN — SERTRALINE HYDROCHLORIDE 100 MG: 50 TABLET ORAL at 08:04

## 2018-04-09 RX ADMIN — AZITHROMYCIN MONOHYDRATE 500 MG: 500 INJECTION, POWDER, LYOPHILIZED, FOR SOLUTION INTRAVENOUS at 07:04

## 2018-04-09 RX ADMIN — IPRATROPIUM BROMIDE AND ALBUTEROL SULFATE 3 ML: .5; 3 SOLUTION RESPIRATORY (INHALATION) at 12:04

## 2018-04-09 RX ADMIN — POTASSIUM CHLORIDE 40 MEQ: 20 TABLET, EXTENDED RELEASE ORAL at 09:04

## 2018-04-09 RX ADMIN — ALPRAZOLAM 0.25 MG: 0.25 TABLET ORAL at 07:04

## 2018-04-09 RX ADMIN — ASPIRIN 81 MG CHEWABLE TABLET 81 MG: 81 TABLET CHEWABLE at 11:04

## 2018-04-09 RX ADMIN — IPRATROPIUM BROMIDE AND ALBUTEROL SULFATE 3 ML: .5; 3 SOLUTION RESPIRATORY (INHALATION) at 07:04

## 2018-04-09 RX ADMIN — MIRTAZAPINE 30 MG: 15 TABLET, FILM COATED ORAL at 08:04

## 2018-04-09 RX ADMIN — IPRATROPIUM BROMIDE AND ALBUTEROL SULFATE 3 ML: .5; 3 SOLUTION RESPIRATORY (INHALATION) at 03:04

## 2018-04-09 RX ADMIN — ALPRAZOLAM 0.25 MG: 0.25 TABLET ORAL at 11:04

## 2018-04-09 RX ADMIN — PANTOPRAZOLE SODIUM 40 MG: 40 TABLET, DELAYED RELEASE ORAL at 09:04

## 2018-04-09 RX ADMIN — CEFTRIAXONE 1 G: 1 INJECTION, SOLUTION INTRAVENOUS at 05:04

## 2018-04-09 NOTE — PROGRESS NOTES
04/09/18 0739   Patient Assessment/Suction   Level of Consciousness (AVPU) alert   All Lung Fields Breath Sounds crackles;diminished   Cough Type fair;nonproductive   PRE-TX-O2-ETCO2   O2 Device (Oxygen Therapy) venti mask   $ Is the patient on Low Flow Oxygen? Yes   Oxygen Analyzed Concentration (%) 40 %   SpO2 96 %   Pulse Oximetry Type Intermittent   $ Pulse Oximetry - Multiple Charge Pulse Oximetry - Multiple   Pulse (!) 125   Resp (!) 33   Aerosol Therapy   $ Aerosol Therapy Charges Aerosol Treatment   Respiratory Treatment Status given   SVN/Inhaler Treatment Route mask   Position During Treatment HOB at 45 degrees   Patient Tolerance good   Post-Treatment   Post-treatment Heart Rate (beats/min) 118   Post-treatment Resp Rate (breaths/min) 35   All Fields Breath Sounds unchanged

## 2018-04-09 NOTE — ASSESSMENT & PLAN NOTE
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 Discussed with Dr. Ceballos. No acute treatment indicated at this time.

## 2018-04-09 NOTE — TELEPHONE ENCOUNTER
----- Message from Avani Squires sent at 4/9/2018 10:30 AM CDT -----  Contact: Kerrie Wade   Daughter wants to let the office know that patient was admitted to [facility] for phenomena

## 2018-04-09 NOTE — H&P
HISTORY OF PRESENT ILLNESS:  Delicia Murray is a patient very well known to me.    She has been receiving chemotherapy and has been becoming more and more frail   over the last several months.  We had to give her a treatment break because of   her frailty and the side effects of weakness and fatigue that was actually   becoming progressive in this patient.  During [place] time, I have more   suddenly withdrawn treatment for a while allowing time for recovery.  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  She   was not a good surgical candidate and there was progressive disease on the PET   findings from 08/2017 came as negative, ALK negative, EGFR negative, ROS1   negative and started combination chemotherapy.  The patient has shown some   amount of moderate stability of her disease on PET scans done 12/2017.  Physical   exam, extremely pale, frail patient who presented with a fall and a new   fracture, hardly able to do much because of severity of COPD.  A CT scan of the   chest was done because of severe shortness of breath requiring 4 liters nasal   cannula showing possible progressive disease, although this was not compared   with the prior PET of 12/2017 at [facility].  The thought was her excessive shortness   of breath may be from a PE; however, this was ruled out as possibly questionably   a postobstructive component of the pneumonia, which has to be confirmed by   Radiology as well.  Long-term Care Hospice management has been discussed with   the patient the hospitalist and hence my dictation.  I have also had a talk with   the daughter and patient.  They are understanding of the fact that she has been   progressing significantly and detailed treatments of each individual issue may   be difficult with her overall frailty and fragility.  I do recommend that she be   treated for her pneumonia as  much as possible while from the hospital and   hospice discussions can be taken place.  Regardless of this fact, her white   count and hemoglobin are stable.  Platelets are slightly diminished from her   previous treatment chemotherapy, which she has just undergone a couple of weeks   back.  Further treatment is not going to be possible at this point with the   degree of her fracture and her severity of shortness of breath and overall   physical status being at least 3+.  Daughter does understand this, but Dr. Peck   has discussed with Dr. [facility] as well as to what else can be   done about the hip and verbally and being told that this was that is really not   much that can be done at this point because of her overall performance status.    We will sign off.  I am dictating this document that I have met face-to-face   with the patient and daughter and had a long conversation about the situation at   hand.      SSS/IN  dd: 04/09/2018 15:59:06 (CDT)  td: 04/09/2018 17:40:11 (CDT)  Doc ID   #[id number]  Job ID #[id number]    CC:

## 2018-04-09 NOTE — PROGRESS NOTES
[facility] Medicine  Progress Note    Patient Name: Delicia Murray  MRN: [id number]  Patient Class: IP- Inpatient   Admission Date: 4/7/2018  Length of Stay: 2 days  Attending Physician: Roge Peck MD  Primary Care Provider: Mono Duncan MD        Subjective:     Principal Problem:Acute on chronic respiratory failure with hypoxia    HPI:  History was obtained from the patient who is an adequate historian. Ms. 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  Symptoms have been aggravated due to a cough that the patient has recently developed issues with.  Patient has had a productive cough of whitish sputum for a couple of months.  Patient undergoes chemotherapy for right lung are [place] and last had chemotherapy on March 13, 2018.  She is followed by Dr. Ceballos with heme/oncology for this condition.  Currently, patient has other respiratory issues including COPD and pulmonary fibrosis and is on chronic oxygen therapy by nasal cannula for this.  She does report subjective fevers/chills over the last 24 hours.  Her shortness of breath is aggravated with activity and cough.  She has not taken any medications for her cough.    Hospital Course:  No notes on file    Interval History: Patient's seen and examined.  No acute events overnight.  Patient remains remarkably hypoxic on 40% facemask.  CT scan of the chest reviewed with evidence of possible secondary mass noted in the right lower lobe and interval increase in size of the right upper lobe mass.  Plan of care reviewed with the patient and the bedside nurse in detail the bedside.    Review of Systems    Constitutional: Positive for fatigue and unexpected weight change.   Respiratory: Positive for cough and shortness of breath.    Cardiovascular: Negative for chest pain and leg swelling.   Gastrointestinal: Positive for nausea. Negative for abdominal pain.   Neurological: Positive for weakness.   Psychiatric/Behavioral: Negative for confusion.   All other systems reviewed and are negative.    Objective:     Vital Signs (Most Recent):  Temp: 98.9 °F (37.2 °C) (04/09/18 1147)  Pulse: 110 (04/09/18 1147)  Resp: (!) 2 (04/09/18 1147)  BP: 136/60 (04/09/18 1147)  SpO2: 100 % (04/09/18 1216) Vital Signs (24h Range):  Temp:  [97.2 °F (36.2 °C)-98.9 °F (37.2 °C)] 98.9 °F (37.2 °C)  Pulse:  [] 110  Resp:  [2-36] 2  SpO2:  [82 %-100 %] 100 %  BP: (123-139)/(58-74) 136/60     Weight: 57.6 kg (127 lb)  Body mass index is 24 kg/m².    Intake/Output Summary (Last 24 hours) at 04/09/18 1623  Last data filed at 04/09/18 0600   Gross per 24 hour   Intake           889.33 ml   Output             2175 ml   Net         -1285.67 ml      Physical Exam   Constitutional: She is oriented to person, place, and time.   Frail, sickly, elderly.   Eyes: EOM are normal. Pupils are equal, round, and reactive to light.   Cardiovascular: Normal rate, regular rhythm and intact distal pulses.    No murmur heard.  Pulmonary/Chest: She is in respiratory distress. She has rales.   Increased effort. Accessory mm usage. Bilateral wheezes/rhonchi lower lobes.   Abdominal: Soft. She exhibits no distension. There is no tenderness.   Musculoskeletal: She exhibits edema.   Neurological: She is alert and oriented to person, place, and time.   Skin: No rash noted. No pallor.   Alopecia noted.   Nursing note and vitals reviewed.      Significant Labs: All pertinent labs within the past 24 hours have been reviewed.    Significant Imaging: I have reviewed all pertinent imaging results/findings within the past 24 hours.    Assessment/Plan:      * Acute on  chronic respiratory failure with hypoxia    Patient with severe respiratory failure from multifactorial reasons-severe COPD with secondary lung masses and infection.  Likelihood that the patient will recover from this is very low. If Dr. Ceballos does not feel that further treatment should be pursued at this point in time, would likely make recommendation the patient to transition to hospice. Continue bronchodilators, supplemental oxygen, and ABX for now.        Closed pelvic fracture    Discussed with orthopedics. Patient not candidate for operative procedure at this time.           Severe malnutrition    Nutrition consulted. Body mass index is 24 kg/m².. Encourage maximal PO intake. Diet supplementation ordered per nutrition approval. Will encourage PO and monitor closely for weight changes.            Malignant neoplasm of upper lobe of right lung    Severe, with new mets found in adrenal- makes stage IV. Discussed with Dr. Ceballos. No acute treatment indicated at this time.          SARAH (iron deficiency anemia)    Patient's anemia is currently controlled. S/p 0 units of PRBCs. 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  Current CBC reviewed-   Lab Results   Component Value Date    HGB 10.3 (L) 04/09/2018    HCT 31.0 (L) 04/09/2018    MCV 88 04/09/2018    PLT 52 (L) 04/09/2018     Monitor serial CBC and transfuse if patient becomes hemodynamically unstable, symptomatic or H/H drops below 7/21.           Hypokalemia    Patient has hypokalemia which is currently uncontrolled. Last electrolytes reviewed-   Recent Labs  Lab 04/08/18  0426 04/09/18  0538   K 4.1 3.1*   . Will replace potassium and monitor electrolytes closely.             Episode of recurrent major depressive disorder    Chronic problem, uncontrolled. Will continue chronic medication(s), SSRI and monitor for any changes, adjusting as needed. Consult .            VTE Risk Mitigation         Ordered     enoxaparin injection 40 mg  Daily     Route:   Subcutaneous        04/07/18 0949     IP VTE HIGH RISK PATIENT  Once      04/07/18 0949              Roge Peck MD  Department of Hospital Medicine   [facility]

## 2018-04-09 NOTE — NURSING
Upon patient assessment. Pt sats were 80%. Increased O2 to 4L NC and sats increased to 92% with resp 28. Breathe sounds are tight and diminished with crackles. RT on unit to evaluate pt. Arianna Salazar Np notified. New orders given for Lasix 40 mg once and willis placed and implemented. Will continue to monitor.

## 2018-04-09 NOTE — ASSESSMENT & PLAN NOTE
Chronic problem, uncontrolled. Will continue chronic medication(s), SSRI and monitor for any changes, adjusting as needed. Consult .

## 2018-04-09 NOTE — ASSESSMENT & PLAN NOTE
Patient's anemia is currently controlled. S/p 0 units of PRBCs. 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  Current CBC reviewed-   Lab Results   Component Value Date    HGB 10.3 (L) 04/09/2018    HCT 31.0 (L) 04/09/2018    MCV 88 04/09/2018    PLT 52 (L) 04/09/2018     Monitor serial CBC and transfuse if patient becomes hemodynamically unstable, symptomatic or H/H drops below 7/21.

## 2018-04-09 NOTE — PROGRESS NOTES
04/08/18 1943   Patient Assessment/Suction   Level of Consciousness (AVPU) alert   Respiratory Effort Unlabored   All Lung Fields Breath Sounds diminished   AMISHA Breath Sounds diminished   LLL Breath Sounds crackles coarse   PRE-TX-O2-ETCO2   O2 Device (Oxygen Therapy) nasal cannula   $ Is the patient on Low Flow Oxygen? Yes   Flow (L/min) 4   SpO2 (!) 82 %   Pulse Oximetry Type Intermittent   $ Pulse Oximetry - Multiple Charge Pulse Oximetry - Multiple   Pulse 110   Resp (!) 31   Aerosol Therapy   $ Aerosol Therapy Charges Aerosol Treatment   Respiratory Treatment Status given   SVN/Inhaler Treatment Route mask;with oxygen   Position During Treatment HOB at 45 degrees   Patient Tolerance good   Post-Treatment   Post-treatment Heart Rate (beats/min) 114   Post-treatment Resp Rate (breaths/min) 30   All Fields Breath Sounds diminished     Pt very short of breath RR 30-35, Sats 82-84% on 4NC. Pt has bilateral coarse/crackles Breath sounds heard post treatment. Pre treatment very diminished with some AMISHA coarse/crackles. Pt is now on 40% Venti Mask Sats 92%. Pt still stating she cant breath. RN spoke with Arianna Salazar NP and orders are in place for alba and Lasix. Will continue to monitor closely.

## 2018-04-09 NOTE — ASSESSMENT & PLAN NOTE
Patient has hypokalemia which is currently uncontrolled. Last electrolytes reviewed-   Recent Labs  Lab 04/08/18  0426 04/09/18  0538   K 4.1 3.1*   . Will replace potassium and monitor electrolytes closely.

## 2018-04-09 NOTE — PLAN OF CARE
Problem: Patient Care Overview  Goal: Plan of Care Review  Outcome: Ongoing (interventions implemented as appropriate)  POC reviewed with pt and 3 daughters concerning pt's SOB/PNA/COPD and the importance of ABT therapy and continuous O2 to maintain O2 needs. Also the need for blood transfusion due to decreased H&H. Other labs and imaging also discussed. All questions answered and all concerns addressed. Safety maintained throughout shift. Pt encouraged to use call light for staff assistance. Pt verbalized understanding. Bed kept low and locked, SR's up x 2, call light within easy reach.

## 2018-04-09 NOTE — ASSESSMENT & PLAN NOTE
Nutrition consulted. Body mass index is 24 kg/m².. Encourage maximal PO intake. Diet supplementation ordered per nutrition approval. Will encourage PO and monitor closely for weight changes.

## 2018-04-09 NOTE — PLAN OF CARE
Met with patient and daughter at bedside.  Patient states she lives alone, has HH services and uses home oxygen at [facility].  Patient states that she plans to return home alone with HH at discharge, and that she has 3 daughters that all live in [place] is she needs assistance.       04/09/18 1532   Discharge Reassessment   Assessment Type Discharge Planning Reassessment   Discharge Plan A Home Health

## 2018-04-09 NOTE — PROGRESS NOTES
04/09/18 1216   Patient Assessment/Suction   Level of Consciousness (AVPU) alert   PRE-TX-O2-ETCO2   O2 Device (Oxygen Therapy) High Flow nasal Cannula   $ Is the patient on Low Flow Oxygen? Yes   Flow (L/min) 15  (decreased to 12L)   SpO2 100 %   Pulse Oximetry Type Intermittent   Aerosol Therapy   $ Aerosol Therapy Charges Refused  (Refused. Pt states txs take too much out of her)   Inhaler   $ Inhaler Charges Refused

## 2018-04-09 NOTE — PLAN OF CARE
Problem: Patient Care Overview  Goal: Plan of Care Review  Outcome: Ongoing (interventions implemented as appropriate)  Patient alert and oriented resting in bed. NAD. Denies pain.SOB on little exertion. ST on monitor. Oliva to gravity. VSS. Tolerated 2 units of PRBCs.    Plan of care reviewed with patient. Verbalizes understanding.Call light in reach.   Pt free from fall or injury. Will monitor.

## 2018-04-09 NOTE — ASSESSMENT & PLAN NOTE
Patient with severe respiratory failure from multifactorial reasons-severe COPD with secondary lung masses and infection.  Likelihood that the patient will recover from this is very low. If Dr. Ceballos does not feel that further treatment should be pursued at this point in time, would likely make recommendation the patient to transition to hospice. Continue bronchodilators, supplemental oxygen, and ABX for now.

## 2018-04-09 NOTE — SUBJECTIVE & OBJECTIVE
Interval History: Patient's seen and examined.  No acute events overnight.  Patient remains remarkably hypoxic on 40% facemask.  CT scan of the chest reviewed with evidence of possible secondary mass noted in the right lower lobe and interval increase in size of the right upper lobe mass.  Plan of care reviewed with the patient and the bedside nurse in detail the bedside.    Review of Systems   Constitutional: Positive for fatigue and unexpected weight change.   Respiratory: Positive for cough and shortness of breath.    Cardiovascular: Negative for chest pain and leg swelling.   Gastrointestinal: Positive for nausea. Negative for abdominal pain.   Neurological: Positive for weakness.   Psychiatric/Behavioral: Negative for confusion.   All other systems reviewed and are negative.    Objective:     Vital Signs (Most Recent):  Temp: 98.9 °F (37.2 °C) (04/09/18 1147)  Pulse: 110 (04/09/18 1147)  Resp: (!) 2 (04/09/18 1147)  BP: 136/60 (04/09/18 1147)  SpO2: 100 % (04/09/18 1216) Vital Signs (24h Range):  Temp:  [97.2 °F (36.2 °C)-98.9 °F (37.2 °C)] 98.9 °F (37.2 °C)  Pulse:  [] 110  Resp:  [2-36] 2  SpO2:  [82 %-100 %] 100 %  BP: (123-139)/(58-74) 136/60     Weight: 57.6 kg (127 lb)  Body mass index is 24 kg/m².    Intake/Output Summary (Last 24 hours) at 04/09/18 1623  Last data filed at 04/09/18 0600   Gross per 24 hour   Intake           889.33 ml   Output             2175 ml   Net         -1285.67 ml      Physical Exam   Constitutional: She is oriented to person, place, and time.   Frail, sickly, elderly.   Eyes: EOM are normal. Pupils are equal, round, and reactive to light.   Cardiovascular: Normal rate, regular rhythm and intact distal pulses.    No murmur heard.  Pulmonary/Chest: She is in respiratory distress. She has rales.   Increased effort. Accessory mm usage. Bilateral wheezes/rhonchi lower lobes.   Abdominal: Soft. She exhibits no distension. There is no tenderness.   Musculoskeletal: She exhibits  edema.   Neurological: She is alert and oriented to person, place, and time.   Skin: No rash noted. No pallor.   Alopecia noted.   Nursing note and vitals reviewed.      Significant Labs: All pertinent labs within the past 24 hours have been reviewed.    Significant Imaging: I have reviewed all pertinent imaging results/findings within the past 24 hours.

## 2018-04-10 PROBLEM — J15.4 STREPTOCOCCAL PNEUMONIA: Status: ACTIVE | Noted: 2018-04-10

## 2018-04-10 PROBLEM — E44.0 MODERATE MALNUTRITION: Status: ACTIVE | Noted: 2018-04-10

## 2018-04-10 LAB
ANION GAP SERPL CALC-SCNC: 9 MMOL/L
BACTERIA BLD CULT: NORMAL
BASOPHILS # BLD AUTO: 0.1 K/UL
BASOPHILS NFR BLD: 1.2 %
BUN SERPL-MCNC: 10 MG/DL
CALCIUM SERPL-MCNC: 8.6 MG/DL
CHLORIDE SERPL-SCNC: 99 MMOL/L
CO2 SERPL-SCNC: 34 MMOL/L
CREAT SERPL-MCNC: 0.5 MG/DL
DIFFERENTIAL METHOD: ABNORMAL
EOSINOPHIL # BLD AUTO: 0.1 K/UL
EOSINOPHIL NFR BLD: 0.5 %
ERYTHROCYTE [DISTWIDTH] IN BLOOD BY AUTOMATED COUNT: 18.4 %
EST. GFR  (AFRICAN AMERICAN): >60 ML/MIN/1.73 M^2
EST. GFR  (NON AFRICAN AMERICAN): >60 ML/MIN/1.73 M^2
GLUCOSE SERPL-MCNC: 93 MG/DL
HCT VFR BLD AUTO: 30.6 %
HGB BLD-MCNC: 10 G/DL
LACTATE SERPL-SCNC: 0.8 MMOL/L
LYMPHOCYTES # BLD AUTO: 1.1 K/UL
LYMPHOCYTES NFR BLD: 9 %
MAGNESIUM SERPL-MCNC: 1.1 MG/DL
MCH RBC QN AUTO: 29.2 PG
MCHC RBC AUTO-ENTMCNC: 32.7 G/DL
MCV RBC AUTO: 89 FL
MONOCYTES # BLD AUTO: 0.8 K/UL
MONOCYTES NFR BLD: 6.2 %
NEUTROPHILS # BLD AUTO: 10.1 K/UL
NEUTROPHILS NFR BLD: 83.1 %
PHOSPHATE SERPL-MCNC: 2.7 MG/DL
PLATELET # BLD AUTO: 48 K/UL
PMV BLD AUTO: 7.9 FL
POTASSIUM SERPL-SCNC: 4.4 MMOL/L
RBC # BLD AUTO: 3.43 M/UL
SODIUM SERPL-SCNC: 142 MMOL/L
WBC # BLD AUTO: 12.2 K/UL

## 2018-04-10 PROCEDURE — 83605 ASSAY OF LACTIC ACID: CPT

## 2018-04-10 PROCEDURE — 85025 COMPLETE CBC W/AUTO DIFF WBC: CPT

## 2018-04-10 PROCEDURE — 83735 ASSAY OF MAGNESIUM: CPT

## 2018-04-10 PROCEDURE — 87040 BLOOD CULTURE FOR BACTERIA: CPT

## 2018-04-10 PROCEDURE — 25000003 PHARM REV CODE 250: Performed by: EMERGENCY MEDICINE

## 2018-04-10 PROCEDURE — 27000221 HC OXYGEN, UP TO 24 HOURS

## 2018-04-10 PROCEDURE — 80048 BASIC METABOLIC PNL TOTAL CA: CPT

## 2018-04-10 PROCEDURE — 36415 COLL VENOUS BLD VENIPUNCTURE: CPT

## 2018-04-10 PROCEDURE — 84100 ASSAY OF PHOSPHORUS: CPT

## 2018-04-10 PROCEDURE — 25000003 PHARM REV CODE 250: Performed by: HOSPITALIST

## 2018-04-10 PROCEDURE — 25000003 PHARM REV CODE 250: Performed by: INTERNAL MEDICINE

## 2018-04-10 PROCEDURE — 99900035 HC TECH TIME PER 15 MIN (STAT)

## 2018-04-10 PROCEDURE — 94761 N-INVAS EAR/PLS OXIMETRY MLT: CPT

## 2018-04-10 PROCEDURE — 11000001 HC ACUTE MED/SURG PRIVATE ROOM

## 2018-04-10 PROCEDURE — 63600175 PHARM REV CODE 636 W HCPCS: Performed by: INTERNAL MEDICINE

## 2018-04-10 RX ORDER — ENOXAPARIN SODIUM 100 MG/ML
40 INJECTION SUBCUTANEOUS EVERY 24 HOURS
Status: DISCONTINUED | OUTPATIENT
Start: 2018-04-10 | End: 2018-04-13 | Stop reason: HOSPADM

## 2018-04-10 RX ORDER — CIPROFLOXACIN 500 MG/1
500 TABLET ORAL EVERY 12 HOURS
Status: DISCONTINUED | OUTPATIENT
Start: 2018-04-10 | End: 2018-04-13 | Stop reason: HOSPADM

## 2018-04-10 RX ORDER — MAGNESIUM SULFATE HEPTAHYDRATE 40 MG/ML
2 INJECTION, SOLUTION INTRAVENOUS ONCE
Status: COMPLETED | OUTPATIENT
Start: 2018-04-10 | End: 2018-04-10

## 2018-04-10 RX ADMIN — CIPROFLOXACIN 500 MG: 500 TABLET, FILM COATED ORAL at 08:04

## 2018-04-10 RX ADMIN — ASPIRIN 81 MG CHEWABLE TABLET 81 MG: 81 TABLET CHEWABLE at 09:04

## 2018-04-10 RX ADMIN — ENOXAPARIN SODIUM 40 MG: 100 INJECTION SUBCUTANEOUS at 05:04

## 2018-04-10 RX ADMIN — SERTRALINE HYDROCHLORIDE 100 MG: 50 TABLET ORAL at 08:04

## 2018-04-10 RX ADMIN — HYDROCODONE BITARTRATE AND ACETAMINOPHEN 1 TABLET: 5; 325 TABLET ORAL at 08:04

## 2018-04-10 RX ADMIN — PANTOPRAZOLE SODIUM 40 MG: 40 TABLET, DELAYED RELEASE ORAL at 09:04

## 2018-04-10 RX ADMIN — VANCOMYCIN HYDROCHLORIDE 750 MG: 750 INJECTION, POWDER, LYOPHILIZED, FOR SOLUTION INTRAVENOUS at 01:04

## 2018-04-10 RX ADMIN — ALPRAZOLAM 0.25 MG: 0.25 TABLET ORAL at 11:04

## 2018-04-10 RX ADMIN — MIRTAZAPINE 30 MG: 15 TABLET, FILM COATED ORAL at 08:04

## 2018-04-10 RX ADMIN — ALPRAZOLAM 0.25 MG: 0.25 TABLET ORAL at 08:04

## 2018-04-10 RX ADMIN — MAGNESIUM SULFATE HEPTAHYDRATE 2 G: 40 INJECTION, SOLUTION INTRAVENOUS at 11:04

## 2018-04-10 RX ADMIN — CIPROFLOXACIN 500 MG: 500 TABLET, FILM COATED ORAL at 01:04

## 2018-04-10 NOTE — PLAN OF CARE
Problem: Pressure Ulcer Risk (Billy Scale) (Adult,Obstetrics,Pediatric)  Goal: Identify Related Risk Factors and Signs and Symptoms  Related risk factors and signs and symptoms are identified upon initiation of Human Response Clinical Practice Guideline (CPG)   Patient lying in bed, call light within reach, o2 sat 95% on 10L,  patient refused to be turned, explained reasoning for skin break down, patient continued to refuse, patient denies pain/discomfort/ distress,

## 2018-04-10 NOTE — PLAN OF CARE
04/09/18 1923   Patient Assessment/Suction   Level of Consciousness (AVPU) alert   Respiratory Effort Unlabored   All Lung Fields Breath Sounds diminished;crackles coarse   PRE-TX-O2-ETCO2   O2 Device (Oxygen Therapy) High Flow nasal Cannula   $ Is the patient on Low Flow Oxygen? Yes   Flow (L/min) 12   SpO2 95 %   Pulse Oximetry Type Intermittent   $ Pulse Oximetry - Multiple Charge Pulse Oximetry - Multiple   Aerosol Therapy   $ Aerosol Therapy Charges Refused   Respiratory Treatment Status refused   pt states she does not want any treatments unless she calls. She says they are not helping her feel better with breathing just makes it worse.

## 2018-04-10 NOTE — ASSESSMENT & PLAN NOTE
- Causing bacteremia- blood culture positive for streptococcus pneumoniae.   - Sensitivity date reviewed.   - Started on Vancomycin per sensitivity data  - Also will repeat culture tomorrow.   - Improving clinical and lab status.

## 2018-04-10 NOTE — PLAN OF CARE
"1330  Received social work consult for hospice.  Family not in room at this time and patient sleeping.  Cindi RN states that family went to eat, but will be back.    1400  Met with patient, 2 daughters and a granddaughter at bedside to discuss hospice.  After a discussion regarding hospice at home, at a family members home, inpatient hospice and the need for sitters; the family requested to speak with a social work because they felt a  would be "more knowledgeable".    1415  Kristin MATHUR will meet with family       04/10/18 1333   Discharge Reassessment   Assessment Type Discharge Planning Reassessment     "

## 2018-04-10 NOTE — CONSULTS
Date: 4/10/2018   Delicia Murray [id number] is a 78 y.o. female who has been consulted for vancomycin dosing.    The patient has the following labs:     Creatinine (mg/dl)    WBC Count   Serum creatinine: 0.5 mg/dL 04/10/18 0520  Estimated creatinine clearance: 73.2 mL/min Lab Results   Component Value Date    WBC 12.20 04/10/2018        Current weight is 57.6 kg (126 lb 15.8 oz)    Pt being treated with vancomycin for bacteremia.    The patient will be continued on vancomycin at a dose of 750 mg every 12 hours. The vancomycin trough has been ordered for 4/11 at 1230.  Target goal is 15-20.     Patient will be followed by pharmacy for changes in renal function, toxicity, and efficacy.      Thank you for allowing us to participate in this patient's care.     Radha Rodriguez, EricD

## 2018-04-10 NOTE — ASSESSMENT & PLAN NOTE
Related to (etiology):   Inadequate energy intake/chemo    Signs and Symptoms (as evidenced by):   11% weight loss in 6 months   Mild muscle depletion in angling of shoulders, scooping of temples    Interventions/Recommendations (treatment strategy):  Continue with diet     Nutrition Diagnosis Status:   New

## 2018-04-10 NOTE — PROGRESS NOTES
[facility] Medicine  Progress Note    Patient Name: Delicia Murray  MRN: [id number]  Patient Class: IP- Inpatient   Admission Date: 4/7/2018  Length of Stay: 3 days  Attending Physician: Kimber Gibbs MD  Primary Care Provider: Mono Duncan MD        Subjective:     Principal Problem:Acute on chronic respiratory failure with hypoxia    HPI:  History was obtained from the patient who is an adequate historian. Ms. 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  Symptoms have been aggravated due to a cough that the patient has recently developed issues with.  Patient has had a productive cough of whitish sputum for a couple of months.  Patient undergoes chemotherapy for right lung are [place] and last had chemotherapy on March 13, 2018.  She is followed by Dr. Ceballos with heme/oncology for this condition.  Currently, patient has other respiratory issues including COPD and pulmonary fibrosis and is on chronic oxygen therapy by nasal cannula for this.  She does report subjective fevers/chills over the last 24 hours.  Her shortness of breath is aggravated with activity and cough.  She has not taken any medications for her cough.    Hospital Course:  - Pt remained on IV antibiotics for bacteremia and Pneumonia     Interval History: Pt reported feeling getting better. She still has increasing requirement for supplemental oxygen.     Review of Systems   Constitutional: Positive for activity change and fatigue.   HENT: Negative.    Eyes: Negative.    Respiratory: Positive for shortness of breath.    Cardiovascular: Negative.    Gastrointestinal: Negative.     Endocrine: Negative.    Genitourinary: Negative.    Musculoskeletal: Negative.    Skin: Negative.    Allergic/Immunologic: Negative.    Neurological: Negative.    Hematological: Negative.    Psychiatric/Behavioral: Negative.      Objective:     Vital Signs (Most Recent):  Temp: 97.6 °F (36.4 °C) (04/10/18 0722)  Pulse: 104 (04/10/18 0818)  Resp: 18 (04/10/18 0818)  BP: (!) 130/59 (04/10/18 0722)  SpO2: 96 % (04/10/18 0818) Vital Signs (24h Range):  Temp:  [97.5 °F (36.4 °C)-98.9 °F (37.2 °C)] 97.6 °F (36.4 °C)  Pulse:  [] 104  Resp:  [2-24] 18  SpO2:  [91 %-100 %] 96 %  BP: (109-136)/(53-67) 130/59     Weight: 57.6 kg (127 lb)  Body mass index is 24 kg/m².    Intake/Output Summary (Last 24 hours) at 04/10/18 0957  Last data filed at 04/10/18 0500   Gross per 24 hour   Intake             1020 ml   Output             1900 ml   Net             -880 ml      Physical Exam   Constitutional: She is oriented to person, place, and time. She appears well-developed and well-nourished.   HENT:   Head: Normocephalic and atraumatic.   Right Ear: External ear normal.   Left Ear: External ear normal.   Nose: Nose normal.   Mouth/Throat: Oropharynx is clear and moist.   Eyes: Conjunctivae and EOM are normal. Pupils are equal, round, and reactive to light. Right eye exhibits no discharge. Left eye exhibits no discharge. No scleral icterus.   Neck: Normal range of motion. Neck supple. No JVD present. No tracheal deviation present. No thyromegaly present.   Cardiovascular: Normal rate, regular rhythm, normal heart sounds and intact distal pulses.    Pulmonary/Chest: Effort normal and breath sounds normal. No stridor.   Abdominal: Soft. Bowel sounds are normal.   Musculoskeletal: Normal range of motion. She exhibits no edema, tenderness or deformity.   Lymphadenopathy:     She has no cervical adenopathy.   Neurological: She is alert and oriented to person, place, and time. She displays normal reflexes. No cranial nerve deficit  or sensory deficit. She exhibits normal muscle tone. Coordination normal.   Skin: Skin is warm and dry. No rash noted. No erythema. No pallor.   Psychiatric: She has a normal mood and affect. Her behavior is normal. Judgment and thought content normal.   Vitals reviewed.      Significant Labs:   CBC:   Recent Labs  Lab 04/09/18  0538 04/10/18  0520   WBC 14.10* 12.20   HGB 10.3* 10.0*   HCT 31.0* 30.6*   PLT 52* 48*     CMP:   Recent Labs  Lab 04/09/18  0538 04/10/18  0520    142   K 3.1* 4.4    99   CO2 32* 34*   GLU 96 93   BUN 16 10   CREATININE 0.5 0.5   CALCIUM 8.4* 8.6*   ANIONGAP 12 9   EGFRNONAA >60 >60       Significant Imaging: I have reviewed all pertinent imaging results/findings within the past 24 hours.    Assessment/Plan:      * Acute on chronic respiratory failure with hypoxia    - Complex etiology, with lung mass, COPD, new pneumonia, causing the picture to become complicated.   - Plan for continuation of supplemental oxygen and IV antibiotics.   - Still requiring high flow oxygen.   - Not a candidate for advance therapies for her lung cancer.   - Dr. Ceballos, the oncologist has been informed and his opinion obtained; plan for hospice service.           Streptococcal pneumonia      - Causing bacteremia- blood culture positive for streptococcus pneumoniae.   - Sensitivity date reviewed.   - Started on Vancomycin per sensitivity data  - Also will repeat culture tomorrow.   - Improving clinical and lab status.         Closed pelvic fracture    Discussed with orthopedics. Patient not candidate for operative procedure at this time.           Severe malnutrition    Nutrition consulted. Body mass index is 24 kg/m².. Encourage maximal PO intake. Diet supplementation ordered per nutrition approval. Will encourage PO and monitor closely for weight changes.            Malignant neoplasm of upper lobe of right lung    Severe, with new mets found in adrenal- makes stage IV. Discussed with Dr. Ceballos. No  acute treatment indicated at this time.  - Hospice care initiated.           SARAH (iron deficiency anemia)    Patient's anemia is currently controlled. S/p 0 units of PRBCs. 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  Current CBC reviewed-   Lab Results   Component Value Date    HGB 10.3 (L) 04/09/2018    HCT 31.0 (L) 04/09/2018    MCV 88 04/09/2018    PLT 52 (L) 04/09/2018     Monitor serial CBC and transfuse if patient becomes hemodynamically unstable, symptomatic or H/H drops below 7/21.           Hypokalemia    Patient has hypokalemia which is currently uncontrolled. Last electrolytes reviewed-   Recent Labs  Lab 04/08/18  0426 04/09/18  0538   K 4.1 3.1*   . Will replace potassium and monitor electrolytes closely.             Episode of recurrent major depressive disorder    Chronic problem, uncontrolled. Will continue chronic medication(s), SSRI and monitor for any changes, adjusting as needed. Consult .            VTE Risk Mitigation         Ordered     enoxaparin injection 40 mg  Daily     Route:  Subcutaneous        04/10/18 0918     IP VTE HIGH RISK PATIENT  Once      04/07/18 0949              Kimber Gibbs MD  Department of Hospital Medicine   [facility]

## 2018-04-10 NOTE — PLAN OF CARE
Cindi ANTON, CM stated pt's family requesting to speak to a .  Met with pt, pt's two daughters and granddaughter to discuss hospice options and to answer their questions regarding hospice services.  Discussed home with hospice, hospice at a nursing home, hospice house admission, payor source at a nursing home.  Provided family with a Senior Resource Guide that had a list of hospice agencies and list of home companions, sitters in it.  Family stated that they would like to think about the options and would get back with me.      3:05 p.m. - spoke to pt/family about option of returning home with the AIM program and educated them on qualifications for inpatient hospice. Informed them [facility], the agency pt had prior to this hospitalization, had an AIM program.  Family stated they would get back with me regarding their decision.      3:30 p.m. - met with mignon Sy who is requesting an information visit with  tomorrow morning here at the hospital.  She requested that I arrange for the meeting time and let her know.  Obtained signature for the disclosure form.      Spoke to Bennett at [facility], [phone] and arrangements made for them to here at 10 a.m. To meet with pt and her family.  Faxed pt's hospice referral through [facility] to [place].  Left message on mignon Sy's voice mail, [phone] to notify her that the meeting would be at 10 a.m. Tomorrow.        04/10/18 4421   Discharge Reassessment   Assessment Type Discharge Planning Reassessment

## 2018-04-10 NOTE — PROGRESS NOTES
04/10/18 0818   Patient Assessment/Suction   Level of Consciousness (AVPU) alert   Respiratory Effort Normal;Unlabored   All Lung Fields Breath Sounds diminished;crackles coarse   PRE-TX-O2-ETCO2   O2 Device (Oxygen Therapy) High Flow nasal Cannula   $ Is the patient on Low Flow Oxygen? Yes   Flow (L/min) 10   SpO2 96 %   Pulse Oximetry Type Intermittent   $ Pulse Oximetry - Multiple Charge Pulse Oximetry - Multiple   Pulse 104   Resp 18   Aerosol Therapy   $ Aerosol Therapy Charges Refused   Respiratory Treatment Status refused   Duoneb Q4, Anora QD, pt refused tx this am, vitals as charted.

## 2018-04-10 NOTE — SUBJECTIVE & OBJECTIVE
Interval History: Pt reported feeling getting better. She still has increasing requirement for supplemental oxygen.     Review of Systems   Constitutional: Positive for activity change and fatigue.   HENT: Negative.    Eyes: Negative.    Respiratory: Positive for shortness of breath.    Cardiovascular: Negative.    Gastrointestinal: Negative.    Endocrine: Negative.    Genitourinary: Negative.    Musculoskeletal: Negative.    Skin: Negative.    Allergic/Immunologic: Negative.    Neurological: Negative.    Hematological: Negative.    Psychiatric/Behavioral: Negative.      Objective:     Vital Signs (Most Recent):  Temp: 97.6 °F (36.4 °C) (04/10/18 0722)  Pulse: 104 (04/10/18 0818)  Resp: 18 (04/10/18 0818)  BP: (!) 130/59 (04/10/18 0722)  SpO2: 96 % (04/10/18 0818) Vital Signs (24h Range):  Temp:  [97.5 °F (36.4 °C)-98.9 °F (37.2 °C)] 97.6 °F (36.4 °C)  Pulse:  [] 104  Resp:  [2-24] 18  SpO2:  [91 %-100 %] 96 %  BP: (109-136)/(53-67) 130/59     Weight: 57.6 kg (127 lb)  Body mass index is 24 kg/m².    Intake/Output Summary (Last 24 hours) at 04/10/18 0957  Last data filed at 04/10/18 0500   Gross per 24 hour   Intake             1020 ml   Output             1900 ml   Net             -880 ml      Physical Exam   Constitutional: She is oriented to person, place, and time. She appears well-developed and well-nourished.   HENT:   Head: Normocephalic and atraumatic.   Right Ear: External ear normal.   Left Ear: External ear normal.   Nose: Nose normal.   Mouth/Throat: Oropharynx is clear and moist.   Eyes: Conjunctivae and EOM are normal. Pupils are equal, round, and reactive to light. Right eye exhibits no discharge. Left eye exhibits no discharge. No scleral icterus.   Neck: Normal range of motion. Neck supple. No JVD present. No tracheal deviation present. No thyromegaly present.   Cardiovascular: Normal rate, regular rhythm, normal heart sounds and intact distal pulses.    Pulmonary/Chest: Effort normal and  breath sounds normal. No stridor.   Abdominal: Soft. Bowel sounds are normal.   Musculoskeletal: Normal range of motion. She exhibits no edema, tenderness or deformity.   Lymphadenopathy:     She has no cervical adenopathy.   Neurological: She is alert and oriented to person, place, and time. She displays normal reflexes. No cranial nerve deficit or sensory deficit. She exhibits normal muscle tone. Coordination normal.   Skin: Skin is warm and dry. No rash noted. No erythema. No pallor.   Psychiatric: She has a normal mood and affect. Her behavior is normal. Judgment and thought content normal.   Vitals reviewed.      Significant Labs:   CBC:   Recent Labs  Lab 04/09/18  0538 04/10/18  0520   WBC 14.10* 12.20   HGB 10.3* 10.0*   HCT 31.0* 30.6*   PLT 52* 48*     CMP:   Recent Labs  Lab 04/09/18  0538 04/10/18  0520    142   K 3.1* 4.4    99   CO2 32* 34*   GLU 96 93   BUN 16 10   CREATININE 0.5 0.5   CALCIUM 8.4* 8.6*   ANIONGAP 12 9   EGFRNONAA >60 >60       Significant Imaging: I have reviewed all pertinent imaging results/findings within the past 24 hours.

## 2018-04-10 NOTE — ASSESSMENT & PLAN NOTE
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 Discussed with Dr. Ceballos. No acute treatment indicated at this time.  - Hospice care initiated.

## 2018-04-10 NOTE — NURSING
Met with family to ask if they had questions or concerns.  They stated they met with , and they have no further questions at this time.  Family requested hospice informational visit with [facility]. Left my contact information for any concerns. JUAN Ryan RN DCM

## 2018-04-10 NOTE — PLAN OF CARE
Problem: Patient Care Overview  Goal: Plan of Care Review  Outcome: Ongoing (interventions implemented as appropriate)  Recommendations  Recommendation/Intervention: 1.) Continue with regular diet 2.) Honor food preferences as able   Goals: 1.) PO intake >/= 50% while admitted  Nutrition Goal Status: new  Communication of RD Recs: other (comment) (care plan)

## 2018-04-10 NOTE — PLAN OF CARE
Problem: Patient Care Overview  Goal: Plan of Care Review  Outcome: Ongoing (interventions implemented as appropriate)  Asleep most of the night. Request Xanax 0.25 mg po for anxiety. Medication given.  Relief noted.  Fall and injury free. Continued on IV AbX. Plan of care reviewed with patient. Understanding voiced.  Bed in low position and locked. Side rails up x 2. Call bell in reach. Telemetry

## 2018-04-10 NOTE — ASSESSMENT & PLAN NOTE
- Complex etiology, with lung mass, COPD, new pneumonia, causing the picture to become complicated.   - Plan for continuation of supplemental oxygen and IV antibiotics.   - Still requiring high flow oxygen.   - Not a candidate for advance therapies for her lung cancer.   - Dr. Ceballos, the oncologist has been informed and his opinion obtained; plan for hospice service.

## 2018-04-10 NOTE — HOSPITAL COURSE
- Pt remained stable overnight  - Pt denied CP, SOB or palpitation.   - Pt reported feeling better overall.

## 2018-04-10 NOTE — PROGRESS NOTES
"  [facility]  Adult Nutrition  Consult Note    SUMMARY     Recommendations  Recommendation/Intervention: 1.) Continue with regular diet 2.) Honor food preferences as able   Goals: 1.) PO intake >/= 50% while admitted  Nutrition Goal Status: new  Communication of RD Recs: other (comment) (care plan)     1. COPD with exacerbation    2. Tachycardia    3. SOB (shortness of breath)    4. Acute exacerbation of chronic obstructive pulmonary disease (COPD)      Past Medical History:   Diagnosis Date    Anemia     Blood transfusion     Cancer     lung cancer    Colon polyps     COPD (chronic obstructive pulmonary disease)     Depression     Insomnia     Vertigo          Reason for Assessment  Reason for Assessment: identified at risk by screening criteria  Interdisciplinary Rounds: attended  General Information Comments: Admits with sepsis and SOB. Alert with family at bedside. Per rounds, discussing hospice. Weight loss reported since 2017. Consumed approx 25% at breakfast. Tolerated well. decreased appetite reported. Denies nausea/vomiting. Mild muscle loss noted in some angling of shoulders, mild scooping of temples    Nutrition Risk Screen  Nutrition Risk Screen: no indicators present    Nutrition/Diet History  Food Preferences: wants ice cream, coke, and fruit.   Do you have any cultural, spiritual, Confucianism conflicts, given your current situation?: none  Food Allergies: NKFA  Factors Affecting Nutritional Intake: decreased appetite    Anthropometrics  Temp: 97.5 °F (36.4 °C)  Height Method: Stated  Height: 5' 1"  Height (inches): 61 in  Weight Method: Stated  Weight: 57.6 kg (126 lb 15.8 oz)  Weight (lb): 126.99 lb  Ideal Body Weight (IBW), Female: 105 lb  % Ideal Body Weight, Female (lb): 120.94 lb  BMI (Calculated): 24  BMI Grade: 18.5-24.9 - normal  Weight Loss: unintentional (in 6 months)  Usual Body Weight (UBW), k.45 kg  % Usual Body Weight: 88.19  % Weight Change " From Usual Weight: -11.99 %       Lab/Procedures/Meds  Pertinent Labs Reviewed: reviewed  BMP  Lab Results   Component Value Date     04/10/2018    K 4.4 04/10/2018    CL 99 04/10/2018    CO2 34 (H) 04/10/2018    BUN 10 04/10/2018    CREATININE 0.5 04/10/2018    CALCIUM 8.6 (L) 04/10/2018    ANIONGAP 9 04/10/2018    ESTGFRAFRICA >60 04/10/2018    EGFRNONAA >60 04/10/2018     Lab Results   Component Value Date    ALBUMIN 2.1 (L) 04/07/2018     Lab Results   Component Value Date    CALCIUM 8.6 (L) 04/10/2018    PHOS 2.7 04/10/2018     No results for input(s): POCTGLUCOSE in the last 24 hours.    Pertinent Medications Reviewed: reviewed  Scheduled Meds:   albuterol-ipratropium 2.5mg-0.5mg/3mL  3 mL Nebulization Q4H    aspirin  81 mg Oral Daily    ciprofloxacin HCl  500 mg Oral Q12H    enoxaparin  40 mg Subcutaneous Daily    magnesium sulfate IVPB  2 g Intravenous Once    mirtazapine  30 mg Oral QHS    pantoprazole  40 mg Oral Daily    sertraline  100 mg Oral QHS    umeclidinium-vilanterol  1 puff Inhalation Daily    vancomycin (VANCOCIN) IVPB  15 mg/kg Intravenous Q12H         Physical Findings/Assessment  Overall Physical Appearance: loss of muscle mass  Oral/Mouth Cavity: WDL  Skin: intact (kaleigh score 19)    Estimated/Assessed Needs  Weight Used For Calorie Calculations: 57.6 kg (126 lb 15.8 oz)  Energy Calorie Requirements (kcal): 4741-3838  Energy Need Method: Tru-[place] (x1.3)  Protein Requirements: 60-70 g/day (1-1.2 g/kg)  Weight Used For Protein Calculations: 57.6 kg (126 lb 15.8 oz)  Fluid Need Method: RDA Method  RDA Method (mL): 1300      Nutrition Prescription Ordered  Current Diet Order: Adult regular diet     Evaluation of Received Nutrient/Fluid Intake  Oral Fluid (mL): 900  Energy Calories Required: not meeting needs  Tolerance: tolerating  % Intake of Estimated Energy Needs: 25 - 50 %  % Meal Intake: 25 - 50 %    Nutrition Risk  Level of Risk/Frequency of Follow-up:  (x2 weekly)      Assessment and Plan    Moderate malnutrition      Related to (etiology):   Inadequate energy intake/chemo    Signs and Symptoms (as evidenced by):   11% weight loss in 6 months   Mild muscle depletion in angling of shoulders, scooping of temples    Interventions/Recommendations (treatment strategy):  Continue with diet     Nutrition Diagnosis Status:   New               Monitor and Evaluation  Food and Nutrient Intake: energy intake, food and beverage intake  Food and Nutrient Adminstration: diet order  Anthropometric Measurements: weight, weight change, body mass index  Biochemical Data, Medical Tests and Procedures: electrolyte and renal panel, glucose/endocrine profile, lipid profile  Nutrition-Focused Physical Findings: skin     Nutrition Follow-Up  RD Follow-up?: Yes     Nutrition discharge planning: regular diet

## 2018-04-11 PROBLEM — E43 SEVERE MALNUTRITION: Status: RESOLVED | Noted: 2018-04-07 | Resolved: 2018-04-11

## 2018-04-11 PROBLEM — E44.0 MODERATE MALNUTRITION: Status: RESOLVED | Noted: 2018-04-10 | Resolved: 2018-04-11

## 2018-04-11 PROBLEM — Z75.8 DISCHARGE PLANNING ISSUES: Status: ACTIVE | Noted: 2018-04-11

## 2018-04-11 LAB
ANION GAP SERPL CALC-SCNC: 8 MMOL/L
BASOPHILS # BLD AUTO: 0 K/UL
BASOPHILS NFR BLD: 0.5 %
BUN SERPL-MCNC: 13 MG/DL
CALCIUM SERPL-MCNC: 8.9 MG/DL
CHLORIDE SERPL-SCNC: 101 MMOL/L
CO2 SERPL-SCNC: 32 MMOL/L
CREAT SERPL-MCNC: 0.5 MG/DL
DIFFERENTIAL METHOD: ABNORMAL
EOSINOPHIL # BLD AUTO: 0.2 K/UL
EOSINOPHIL NFR BLD: 1.5 %
ERYTHROCYTE [DISTWIDTH] IN BLOOD BY AUTOMATED COUNT: 17.5 %
EST. GFR  (AFRICAN AMERICAN): >60 ML/MIN/1.73 M^2
EST. GFR  (NON AFRICAN AMERICAN): >60 ML/MIN/1.73 M^2
GLUCOSE SERPL-MCNC: 95 MG/DL
HCT VFR BLD AUTO: 29.4 %
HGB BLD-MCNC: 9.5 G/DL
LACTATE SERPL-SCNC: 0.6 MMOL/L
LYMPHOCYTES # BLD AUTO: 1.3 K/UL
LYMPHOCYTES NFR BLD: 12.4 %
MAGNESIUM SERPL-MCNC: 1 MG/DL
MCH RBC QN AUTO: 29.2 PG
MCHC RBC AUTO-ENTMCNC: 32.4 G/DL
MCV RBC AUTO: 90 FL
MONOCYTES # BLD AUTO: 0.4 K/UL
MONOCYTES NFR BLD: 3.8 %
NEUTROPHILS # BLD AUTO: 8.3 K/UL
NEUTROPHILS NFR BLD: 81.8 %
PHOSPHATE SERPL-MCNC: 3.2 MG/DL
PLATELET # BLD AUTO: 47 K/UL
PMV BLD AUTO: 7.9 FL
POTASSIUM SERPL-SCNC: 3.8 MMOL/L
RBC # BLD AUTO: 3.27 M/UL
SODIUM SERPL-SCNC: 141 MMOL/L
VANCOMYCIN TROUGH SERPL-MCNC: 5.1 UG/ML
WBC # BLD AUTO: 10.2 K/UL

## 2018-04-11 PROCEDURE — 27000221 HC OXYGEN, UP TO 24 HOURS

## 2018-04-11 PROCEDURE — 25000003 PHARM REV CODE 250: Performed by: HOSPITALIST

## 2018-04-11 PROCEDURE — 99900035 HC TECH TIME PER 15 MIN (STAT)

## 2018-04-11 PROCEDURE — 94640 AIRWAY INHALATION TREATMENT: CPT

## 2018-04-11 PROCEDURE — 25000003 PHARM REV CODE 250: Performed by: INTERNAL MEDICINE

## 2018-04-11 PROCEDURE — 83605 ASSAY OF LACTIC ACID: CPT

## 2018-04-11 PROCEDURE — 80048 BASIC METABOLIC PNL TOTAL CA: CPT

## 2018-04-11 PROCEDURE — 25000003 PHARM REV CODE 250: Performed by: EMERGENCY MEDICINE

## 2018-04-11 PROCEDURE — 11000001 HC ACUTE MED/SURG PRIVATE ROOM

## 2018-04-11 PROCEDURE — 85025 COMPLETE CBC W/AUTO DIFF WBC: CPT

## 2018-04-11 PROCEDURE — 63600175 PHARM REV CODE 636 W HCPCS: Performed by: INTERNAL MEDICINE

## 2018-04-11 PROCEDURE — 94761 N-INVAS EAR/PLS OXIMETRY MLT: CPT

## 2018-04-11 PROCEDURE — 36415 COLL VENOUS BLD VENIPUNCTURE: CPT

## 2018-04-11 PROCEDURE — 63600175 PHARM REV CODE 636 W HCPCS: Performed by: NURSE PRACTITIONER

## 2018-04-11 PROCEDURE — 25000242 PHARM REV CODE 250 ALT 637 W/ HCPCS: Performed by: FAMILY MEDICINE

## 2018-04-11 PROCEDURE — 83735 ASSAY OF MAGNESIUM: CPT

## 2018-04-11 PROCEDURE — 80202 ASSAY OF VANCOMYCIN: CPT

## 2018-04-11 PROCEDURE — 84100 ASSAY OF PHOSPHORUS: CPT

## 2018-04-11 RX ORDER — FUROSEMIDE 10 MG/ML
40 INJECTION INTRAMUSCULAR; INTRAVENOUS ONCE
Status: COMPLETED | OUTPATIENT
Start: 2018-04-11 | End: 2018-04-11

## 2018-04-11 RX ORDER — POTASSIUM CHLORIDE 20 MEQ/1
40 TABLET, EXTENDED RELEASE ORAL ONCE
Status: COMPLETED | OUTPATIENT
Start: 2018-04-11 | End: 2018-04-11

## 2018-04-11 RX ORDER — MAGNESIUM SULFATE HEPTAHYDRATE 40 MG/ML
2 INJECTION, SOLUTION INTRAVENOUS ONCE
Status: COMPLETED | OUTPATIENT
Start: 2018-04-11 | End: 2018-04-11

## 2018-04-11 RX ADMIN — FUROSEMIDE 40 MG: 10 INJECTION, SOLUTION INTRAMUSCULAR; INTRAVENOUS at 05:04

## 2018-04-11 RX ADMIN — ENOXAPARIN SODIUM 40 MG: 100 INJECTION SUBCUTANEOUS at 05:04

## 2018-04-11 RX ADMIN — VANCOMYCIN HYDROCHLORIDE 750 MG: 750 INJECTION, POWDER, LYOPHILIZED, FOR SOLUTION INTRAVENOUS at 02:04

## 2018-04-11 RX ADMIN — ALPRAZOLAM 0.25 MG: 0.25 TABLET ORAL at 08:04

## 2018-04-11 RX ADMIN — MIRTAZAPINE 30 MG: 15 TABLET, FILM COATED ORAL at 08:04

## 2018-04-11 RX ADMIN — ALPRAZOLAM 0.25 MG: 0.25 TABLET ORAL at 09:04

## 2018-04-11 RX ADMIN — IPRATROPIUM BROMIDE AND ALBUTEROL SULFATE 3 ML: .5; 3 SOLUTION RESPIRATORY (INHALATION) at 07:04

## 2018-04-11 RX ADMIN — HYDROCODONE BITARTRATE AND ACETAMINOPHEN 1 TABLET: 5; 325 TABLET ORAL at 08:04

## 2018-04-11 RX ADMIN — ASPIRIN 81 MG CHEWABLE TABLET 81 MG: 81 TABLET CHEWABLE at 09:04

## 2018-04-11 RX ADMIN — CIPROFLOXACIN 500 MG: 500 TABLET, FILM COATED ORAL at 09:04

## 2018-04-11 RX ADMIN — PANTOPRAZOLE SODIUM 40 MG: 40 TABLET, DELAYED RELEASE ORAL at 09:04

## 2018-04-11 RX ADMIN — HYDROCODONE BITARTRATE AND ACETAMINOPHEN 1 TABLET: 5; 325 TABLET ORAL at 03:04

## 2018-04-11 RX ADMIN — MAGNESIUM SULFATE HEPTAHYDRATE 2 G: 40 INJECTION, SOLUTION INTRAVENOUS at 07:04

## 2018-04-11 RX ADMIN — VANCOMYCIN HYDROCHLORIDE 750 MG: 750 INJECTION, POWDER, LYOPHILIZED, FOR SOLUTION INTRAVENOUS at 03:04

## 2018-04-11 RX ADMIN — ALPRAZOLAM 0.25 MG: 0.25 TABLET ORAL at 03:04

## 2018-04-11 RX ADMIN — POTASSIUM CHLORIDE 40 MEQ: 1500 TABLET, EXTENDED RELEASE ORAL at 05:04

## 2018-04-11 RX ADMIN — HYDROCODONE BITARTRATE AND ACETAMINOPHEN 1 TABLET: 5; 325 TABLET ORAL at 09:04

## 2018-04-11 RX ADMIN — SERTRALINE HYDROCHLORIDE 100 MG: 50 TABLET ORAL at 08:04

## 2018-04-11 RX ADMIN — CIPROFLOXACIN 500 MG: 500 TABLET, FILM COATED ORAL at 08:04

## 2018-04-11 NOTE — PROGRESS NOTES
[facility] Medicine  Progress Note    Patient Name: Delicia Murray  MRN: [id number]  Patient Class: IP- Inpatient   Admission Date: 4/7/2018  Length of Stay: 4 days  Attending Physician: Kimber Gibbs MD  Primary Care Provider: Mono Duncan MD        Subjective:     Principal Problem:Acute on chronic respiratory failure with hypoxia    HPI:  History was obtained from the patient who is an adequate historian. Ms. 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  Symptoms have been aggravated due to a cough that the patient has recently developed issues with.  Patient has had a productive cough of whitish sputum for a couple of months.  Patient undergoes chemotherapy for right lung are [place] and last had chemotherapy on March 13, 2018.  She is followed by Dr. Ceballos with heme/oncology for this condition.  Currently, patient has other respiratory issues including COPD and pulmonary fibrosis and is on chronic oxygen therapy by nasal cannula for this.  She does report subjective fevers/chills over the last 24 hours.  Her shortness of breath is aggravated with activity and cough.  She has not taken any medications for her cough.    Hospital Course:  - Pt remained on IV antibiotics for bacteremia and Pneumonia     Interval History: Pt reported feeling better. Denied CP, reported SOB this morning, which improved with increasing the dose of supplemental oxygen.     Review of Systems   Constitutional:        Activity changed as pt is post pelvic fracture    HENT: Negative.    Respiratory: Positive for shortness of breath.    Cardiovascular: Negative.     Gastrointestinal: Negative.    Endocrine: Negative.    Genitourinary: Positive for difficulty urinating.        Due to pain with movement    Musculoskeletal: Negative for back pain.        Pelvic pain    Skin: Negative.    Neurological: Negative.    Hematological: Negative.    Psychiatric/Behavioral: Negative.      Objective:     Vital Signs (Most Recent):  Temp: 97.1 °F (36.2 °C) (04/11/18 1219)  Pulse: 88 (04/11/18 1219)  Resp: 20 (04/11/18 1219)  BP: (!) 106/51 (04/11/18 1219)  SpO2: 96 % (04/11/18 1219) Vital Signs (24h Range):  Temp:  [97.1 °F (36.2 °C)-98.2 °F (36.8 °C)] 97.1 °F (36.2 °C)  Pulse:  [] 88  Resp:  [18-32] 20  SpO2:  [90 %-98 %] 96 %  BP: (106-123)/(50-60) 106/51     Weight: 57.6 kg (126 lb 15.8 oz)  Body mass index is 23.99 kg/m².    Intake/Output Summary (Last 24 hours) at 04/11/18 1522  Last data filed at 04/11/18 1245   Gross per 24 hour   Intake              490 ml   Output             1450 ml   Net             -960 ml      Physical Exam   Constitutional: She is oriented to person, place, and time. She appears well-developed and well-nourished.   HENT:   Head: Normocephalic and atraumatic.   Right Ear: External ear normal.   Left Ear: External ear normal.   Nose: Nose normal.   Mouth/Throat: Oropharynx is clear and moist.   Eyes: Conjunctivae and EOM are normal. Pupils are equal, round, and reactive to light. Right eye exhibits no discharge. Left eye exhibits no discharge. No scleral icterus.   Neck: Normal range of motion. Neck supple. No JVD present. No tracheal deviation present. No thyromegaly present.   Pulmonary/Chest: Effort normal. She has rales.   Need for supplemental oxygen    Abdominal: Soft. Bowel sounds are normal.   Musculoskeletal: Normal range of motion. She exhibits no edema, tenderness or deformity.   Lymphadenopathy:     She has no cervical adenopathy.   Neurological: She is alert and oriented to person, place, and time. She displays normal reflexes. No cranial  nerve deficit or sensory deficit.   Skin: Skin is warm and dry. Capillary refill takes less than 2 seconds. No rash noted. No erythema.   Nursing note and vitals reviewed.      Significant Labs:   CBC:   Recent Labs  Lab 04/10/18  0520 04/11/18 0432   WBC 12.20 10.20   HGB 10.0* 9.5*   HCT 30.6* 29.4*   PLT 48* 47*     CMP:   Recent Labs  Lab 04/10/18  0520 04/11/18  0432    141   K 4.4 3.8   CL 99 101   CO2 34* 32*   GLU 93 95   BUN 10 13   CREATININE 0.5 0.5   CALCIUM 8.6* 8.9   ANIONGAP 9 8   EGFRNONAA >60 >60       Significant Imaging: I have reviewed all pertinent imaging results/findings within the past 24 hours.    Assessment/Plan:      * Acute on chronic respiratory failure with hypoxia    - In the setting of complex malignant lung mass, new infiltrate suspected for pneumonia,   - Requiring supplemental oxygen, and continuation of IV antibiotics.   - Not a candidate for advance therapies for her lung cancer.   - Dr. Ceballos, the oncologist has been informed and his opinion obtained; plan for hospice service.   - WIll give a dose of IV lasix for pulmonary congestion and continue high flow supplemental oxygen.           Discharge planning issues      - Pt to be discharged home with home hospice.   - Predischarge issue with placement, proper care for urine disposition and handing of the patient with pelvic fracture to be addressed.   - Ortho to give recommendations regarding the out of hospital management.         Streptococcal pneumonia      - Sensitivity checked and continue with vancomycin.         Closed pelvic fracture    -Not a candidate for orthopedic intervention.   -Changing position is painful.   -Plan for concservative management without need for intervention.           Malignant neoplasm of upper lobe of right lung    Severe, with new mets found in adrenal- makes stage IV. Discussed with Dr. Ceballos. No acute treatment indicated at this time.  -Pt's family has communicated with hospice company  who will take the patient.             SARAH (iron deficiency anemia)    Patient's anemia is currently controlled. S/p 0 units of PRBCs. 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  Current CBC reviewed-   Lab Results   Component Value Date    HGB 9.5 (L) 04/11/2018    HCT 29.4 (L) 04/11/2018    MCV 90 04/11/2018    PLT 47 (L) 04/11/2018     Monitor serial CBC and transfuse if patient becomes hemodynamically unstable, symptomatic or H/H drops below 7/21.           Episode of recurrent major depressive disorder    Chronic problem, uncontrolled. Will continue chronic medication(s), SSRI and monitor for any changes, adjusting as needed. Consult .            VTE Risk Mitigation         Ordered     enoxaparin injection 40 mg  Daily     Route:  Subcutaneous        04/10/18 0918     IP VTE HIGH RISK PATIENT  Once      04/07/18 0949              Kimber Gibbs MD  Department of Hospital Medicine   [facility]

## 2018-04-11 NOTE — PLAN OF CARE
Charlene with [facility] with pt's family.  Daughter  Kerrie stated that they would like pt to discharge home with hospice.  They stated that they spoke to Dr. Gibbs and tentative discharge was Friday.  I spoke to Dr. Gibbs who verified that pt was not ready for discharge today.   Pt currently on 10 liter oxygen.  Plan is to attempt to have pt on lower oxygen flow prior to discharge home.     Plan for pt to discharge to her daughter Kerrie's home at [address] [place] [address]., [place] [address].     04/11/18 1220   Discharge Reassessment   Assessment Type Discharge Planning Reassessment   Discharge Plan A Hospice/home

## 2018-04-11 NOTE — ASSESSMENT & PLAN NOTE
-Not a candidate for orthopedic intervention.   -Changing position is painful.   -Plan for concservative management without need for intervention.

## 2018-04-11 NOTE — CONSULTS
Date: 4/11/2018   Delicia Murray [id number] is a 78 y.o. female who has been consulted for vancomycin dosing.    The patient has the following labs:     Creatinine (mg/dl)    WBC Count   Serum creatinine: 0.5 mg/dL 04/11/18 0432  Estimated creatinine clearance: 73.2 mL/min Lab Results   Component Value Date    WBC 10.20 04/11/2018        Current weight is 57.6 kg (126 lb 15.8 oz)      Pt is receiving vancomycin 750 mg every 12 hours.  Vancomycin trough before 3rd dose from 4/11/2018  at 1403  was 5.1 mg/dL.  Vancomycin is clearing.    The vancomycin trough before 4th dose has been ordered for 4/12/18 at 0300.     Patient will be followed by pharmacy for changes in renal function, toxicity, and efficacy.    Thank you for allowing us to participate in this patient's care.     Anthony Spears, Pharmacist

## 2018-04-11 NOTE — ASSESSMENT & PLAN NOTE
- Pt to be discharged home with home hospice.   - Predischarge issue with placement, proper care for urine disposition and handing of the patient with pelvic fracture to be addressed.   - Ortho to give recommendations regarding the out of hospital management.

## 2018-04-11 NOTE — ASSESSMENT & PLAN NOTE
- In the setting of complex malignant lung mass, new infiltrate suspected for pneumonia,   - Requiring supplemental oxygen, and continuation of IV antibiotics.   - Not a candidate for advance therapies for her lung cancer.   - Dr. Ceballos, the oncologist has been informed and his opinion obtained; plan for hospice service.   - WIll give a dose of IV lasix for pulmonary congestion and continue high flow supplemental oxygen.

## 2018-04-11 NOTE — SUBJECTIVE & OBJECTIVE
Interval History: Pt reported feeling better. Denied CP, reported SOB this morning, which improved with increasing the dose of supplemental oxygen.     Review of Systems   Constitutional:        Activity changed as pt is post pelvic fracture    HENT: Negative.    Respiratory: Positive for shortness of breath.    Cardiovascular: Negative.    Gastrointestinal: Negative.    Endocrine: Negative.    Genitourinary: Positive for difficulty urinating.        Due to pain with movement    Musculoskeletal: Negative for back pain.        Pelvic pain    Skin: Negative.    Neurological: Negative.    Hematological: Negative.    Psychiatric/Behavioral: Negative.      Objective:     Vital Signs (Most Recent):  Temp: 97.1 °F (36.2 °C) (04/11/18 1219)  Pulse: 88 (04/11/18 1219)  Resp: 20 (04/11/18 1219)  BP: (!) 106/51 (04/11/18 1219)  SpO2: 96 % (04/11/18 1219) Vital Signs (24h Range):  Temp:  [97.1 °F (36.2 °C)-98.2 °F (36.8 °C)] 97.1 °F (36.2 °C)  Pulse:  [] 88  Resp:  [18-32] 20  SpO2:  [90 %-98 %] 96 %  BP: (106-123)/(50-60) 106/51     Weight: 57.6 kg (126 lb 15.8 oz)  Body mass index is 23.99 kg/m².    Intake/Output Summary (Last 24 hours) at 04/11/18 1522  Last data filed at 04/11/18 1245   Gross per 24 hour   Intake              490 ml   Output             1450 ml   Net             -960 ml      Physical Exam   Constitutional: She is oriented to person, place, and time. She appears well-developed and well-nourished.   HENT:   Head: Normocephalic and atraumatic.   Right Ear: External ear normal.   Left Ear: External ear normal.   Nose: Nose normal.   Mouth/Throat: Oropharynx is clear and moist.   Eyes: Conjunctivae and EOM are normal. Pupils are equal, round, and reactive to light. Right eye exhibits no discharge. Left eye exhibits no discharge. No scleral icterus.   Neck: Normal range of motion. Neck supple. No JVD present. No tracheal deviation present. No thyromegaly present.   Pulmonary/Chest: Effort normal. She has  rales.   Need for supplemental oxygen    Abdominal: Soft. Bowel sounds are normal.   Musculoskeletal: Normal range of motion. She exhibits no edema, tenderness or deformity.   Lymphadenopathy:     She has no cervical adenopathy.   Neurological: She is alert and oriented to person, place, and time. She displays normal reflexes. No cranial nerve deficit or sensory deficit.   Skin: Skin is warm and dry. Capillary refill takes less than 2 seconds. No rash noted. No erythema.   Nursing note and vitals reviewed.      Significant Labs:   CBC:   Recent Labs  Lab 04/10/18  0520 04/11/18 0432   WBC 12.20 10.20   HGB 10.0* 9.5*   HCT 30.6* 29.4*   PLT 48* 47*     CMP:   Recent Labs  Lab 04/10/18  0520 04/11/18  0432    141   K 4.4 3.8   CL 99 101   CO2 34* 32*   GLU 93 95   BUN 10 13   CREATININE 0.5 0.5   CALCIUM 8.6* 8.9   ANIONGAP 9 8   EGFRNONAA >60 >60       Significant Imaging: I have reviewed all pertinent imaging results/findings within the past 24 hours.

## 2018-04-11 NOTE — ASSESSMENT & PLAN NOTE
Patient's anemia is currently controlled. S/p 0 units of PRBCs. 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  Current CBC reviewed-   Lab Results   Component Value Date    HGB 9.5 (L) 04/11/2018    HCT 29.4 (L) 04/11/2018    MCV 90 04/11/2018    PLT 47 (L) 04/11/2018     Monitor serial CBC and transfuse if patient becomes hemodynamically unstable, symptomatic or H/H drops below 7/21.

## 2018-04-11 NOTE — NURSING
Patient lying in bed, patients o2 sat 94% at 6 L high flow nc, no distress noted, patient states she feels comfortable

## 2018-04-11 NOTE — ASSESSMENT & PLAN NOTE
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 Discussed with Dr. Ceballos. No acute treatment indicated at this time.  -Pt's family has communicated with hospice company who will take the patient.

## 2018-04-11 NOTE — PROGRESS NOTES
Spoke with patient granddaughter concerning her not wanting to be repositioned. Notified granddaughter that we could encourage pt to reposition but we cannot turn her if she says no. Pt continues to refuse to be turned. Will continue to monitor.

## 2018-04-12 LAB
ANION GAP SERPL CALC-SCNC: 9 MMOL/L
BASOPHILS # BLD AUTO: 0.1 K/UL
BASOPHILS NFR BLD: 0.8 %
BUN SERPL-MCNC: 13 MG/DL
CALCIUM SERPL-MCNC: 9 MG/DL
CHLORIDE SERPL-SCNC: 99 MMOL/L
CO2 SERPL-SCNC: 34 MMOL/L
CREAT SERPL-MCNC: 0.6 MG/DL
DIFFERENTIAL METHOD: ABNORMAL
EOSINOPHIL # BLD AUTO: 0.1 K/UL
EOSINOPHIL NFR BLD: 1.5 %
ERYTHROCYTE [DISTWIDTH] IN BLOOD BY AUTOMATED COUNT: 17.3 %
EST. GFR  (AFRICAN AMERICAN): >60 ML/MIN/1.73 M^2
EST. GFR  (NON AFRICAN AMERICAN): >60 ML/MIN/1.73 M^2
GLUCOSE SERPL-MCNC: 101 MG/DL
HCT VFR BLD AUTO: 32.1 %
HGB BLD-MCNC: 10.3 G/DL
LACTATE SERPL-SCNC: 1 MMOL/L
LYMPHOCYTES # BLD AUTO: 1.3 K/UL
LYMPHOCYTES NFR BLD: 13.1 %
MAGNESIUM SERPL-MCNC: 1.1 MG/DL
MCH RBC QN AUTO: 28.8 PG
MCHC RBC AUTO-ENTMCNC: 32 G/DL
MCV RBC AUTO: 90 FL
MONOCYTES # BLD AUTO: 0.5 K/UL
MONOCYTES NFR BLD: 5 %
NEUTROPHILS # BLD AUTO: 7.8 K/UL
NEUTROPHILS NFR BLD: 79.6 %
PHOSPHATE SERPL-MCNC: 3.4 MG/DL
PLATELET # BLD AUTO: 49 K/UL
PMV BLD AUTO: 8.6 FL
POCT GLUCOSE: 125 MG/DL (ref 70–110)
POTASSIUM SERPL-SCNC: 4.6 MMOL/L
RBC # BLD AUTO: 3.56 M/UL
SODIUM SERPL-SCNC: 142 MMOL/L
VANCOMYCIN TROUGH SERPL-MCNC: 7.1 UG/ML
WBC # BLD AUTO: 9.8 K/UL

## 2018-04-12 PROCEDURE — 99900035 HC TECH TIME PER 15 MIN (STAT)

## 2018-04-12 PROCEDURE — 84100 ASSAY OF PHOSPHORUS: CPT

## 2018-04-12 PROCEDURE — 97530 THERAPEUTIC ACTIVITIES: CPT

## 2018-04-12 PROCEDURE — 83735 ASSAY OF MAGNESIUM: CPT

## 2018-04-12 PROCEDURE — 94640 AIRWAY INHALATION TREATMENT: CPT

## 2018-04-12 PROCEDURE — 11000001 HC ACUTE MED/SURG PRIVATE ROOM

## 2018-04-12 PROCEDURE — 83605 ASSAY OF LACTIC ACID: CPT

## 2018-04-12 PROCEDURE — 27000221 HC OXYGEN, UP TO 24 HOURS

## 2018-04-12 PROCEDURE — 85025 COMPLETE CBC W/AUTO DIFF WBC: CPT

## 2018-04-12 PROCEDURE — 80202 ASSAY OF VANCOMYCIN: CPT

## 2018-04-12 PROCEDURE — 97161 PT EVAL LOW COMPLEX 20 MIN: CPT

## 2018-04-12 PROCEDURE — 80048 BASIC METABOLIC PNL TOTAL CA: CPT

## 2018-04-12 PROCEDURE — 25000242 PHARM REV CODE 250 ALT 637 W/ HCPCS: Performed by: FAMILY MEDICINE

## 2018-04-12 PROCEDURE — 63600175 PHARM REV CODE 636 W HCPCS: Performed by: INTERNAL MEDICINE

## 2018-04-12 PROCEDURE — 25000003 PHARM REV CODE 250: Performed by: EMERGENCY MEDICINE

## 2018-04-12 PROCEDURE — 36415 COLL VENOUS BLD VENIPUNCTURE: CPT

## 2018-04-12 PROCEDURE — 94761 N-INVAS EAR/PLS OXIMETRY MLT: CPT

## 2018-04-12 PROCEDURE — 25000003 PHARM REV CODE 250: Performed by: HOSPITALIST

## 2018-04-12 PROCEDURE — 25000003 PHARM REV CODE 250: Performed by: INTERNAL MEDICINE

## 2018-04-12 RX ORDER — MAGNESIUM SULFATE HEPTAHYDRATE 40 MG/ML
2 INJECTION, SOLUTION INTRAVENOUS ONCE
Status: COMPLETED | OUTPATIENT
Start: 2018-04-12 | End: 2018-04-12

## 2018-04-12 RX ORDER — IPRATROPIUM BROMIDE AND ALBUTEROL SULFATE 2.5; .5 MG/3ML; MG/3ML
3 SOLUTION RESPIRATORY (INHALATION) EVERY 6 HOURS PRN
Status: DISCONTINUED | OUTPATIENT
Start: 2018-04-12 | End: 2018-04-13 | Stop reason: HOSPADM

## 2018-04-12 RX ADMIN — IPRATROPIUM BROMIDE AND ALBUTEROL SULFATE 3 ML: .5; 3 SOLUTION RESPIRATORY (INHALATION) at 04:04

## 2018-04-12 RX ADMIN — ASPIRIN 81 MG CHEWABLE TABLET 81 MG: 81 TABLET CHEWABLE at 08:04

## 2018-04-12 RX ADMIN — ALPRAZOLAM 0.25 MG: 0.25 TABLET ORAL at 05:04

## 2018-04-12 RX ADMIN — ALPRAZOLAM 0.25 MG: 0.25 TABLET ORAL at 02:04

## 2018-04-12 RX ADMIN — ENOXAPARIN SODIUM 40 MG: 100 INJECTION SUBCUTANEOUS at 04:04

## 2018-04-12 RX ADMIN — SERTRALINE HYDROCHLORIDE 100 MG: 50 TABLET ORAL at 08:04

## 2018-04-12 RX ADMIN — IPRATROPIUM BROMIDE AND ALBUTEROL SULFATE 3 ML: .5; 3 SOLUTION RESPIRATORY (INHALATION) at 12:04

## 2018-04-12 RX ADMIN — VANCOMYCIN HYDROCHLORIDE 1250 MG: 1 INJECTION, POWDER, LYOPHILIZED, FOR SOLUTION INTRAVENOUS at 04:04

## 2018-04-12 RX ADMIN — CIPROFLOXACIN 500 MG: 500 TABLET, FILM COATED ORAL at 08:04

## 2018-04-12 RX ADMIN — HYDROCODONE BITARTRATE AND ACETAMINOPHEN 1 TABLET: 5; 325 TABLET ORAL at 10:04

## 2018-04-12 RX ADMIN — HYDROCODONE BITARTRATE AND ACETAMINOPHEN 1 TABLET: 5; 325 TABLET ORAL at 02:04

## 2018-04-12 RX ADMIN — MAGNESIUM SULFATE HEPTAHYDRATE 2 G: 40 INJECTION, SOLUTION INTRAVENOUS at 08:04

## 2018-04-12 RX ADMIN — HYDROCODONE BITARTRATE AND ACETAMINOPHEN 1 TABLET: 5; 325 TABLET ORAL at 08:04

## 2018-04-12 RX ADMIN — MIRTAZAPINE 30 MG: 15 TABLET, FILM COATED ORAL at 08:04

## 2018-04-12 RX ADMIN — IPRATROPIUM BROMIDE AND ALBUTEROL SULFATE 3 ML: .5; 3 SOLUTION RESPIRATORY (INHALATION) at 07:04

## 2018-04-12 RX ADMIN — ALPRAZOLAM 0.25 MG: 0.25 TABLET ORAL at 08:04

## 2018-04-12 RX ADMIN — PANTOPRAZOLE SODIUM 40 MG: 40 TABLET, DELAYED RELEASE ORAL at 08:04

## 2018-04-12 NOTE — CONSULTS
Delicia Murray [id number] is a 78 y.o. female who has been consulted for vancomycin dosing.    The patient has the following labs:     Date Creatinine (mg/dl)    BUN WBC Count   4/12/2018 Estimated Creatinine Clearance: 73.2 mL/min (based on SCr of 0.5 mg/dL). Lab Results   Component Value Date    BUN 13 04/11/2018     Lab Results   Component Value Date    WBC 9.80 04/12/2018        Current weight is 57.6 kg (126 lb 15.8 oz)    Pt is receiving vancomycin 750 mg every 12 hours.  Vancomycin trough from 4/12 at 0317 was 7.1 mg/dL.  Target trough range is 15-20 mg/dL.   Trough was drawn on time and anticipate it is subtherapeutic. Pharmacy will increase current dose.   The patient will be changed to a vancomycin dose of 1250 mg every 12 hours. A vancomycin trough has been ordered for 4/13 at 0430.      Patient will be followed by pharmacy for changes in renal function, toxicity, and efficacy.  Thank you for allowing us to participate in this patient's care.     Birgit Hernandez

## 2018-04-12 NOTE — PT/OT/SLP EVAL
"Physical Therapy Evaluation    Patient Name:  Delicia Murray   MRN:  [id number]    Recommendations:     Discharge Recommendations:  home with hospice   Discharge Equipment Recommendations: bedside commode, hospital bed, wheelchair   Barriers to discharge: None    Assessment:     Delicia Murray is a 78 y.o. female admitted with a medical diagnosis of Acute on chronic respiratory failure with hypoxia.  She presents with the following impairments/functional limitations:  weakness, impaired endurance, impaired functional mobilty, impaired self care skills, impaired balance, decreased lower extremity function, pain, decreased upper extremity function, orthopedic precautions, impaired cardiopulmonary response to activity, impaired sensation. Pt with limited independence and endurance for mobility 2/2 pain from pelvic fracture and SOB with exertion. She is willing to participate and wants to be able to transfer to and from a chair at home. She would benefit from continued acute PT to further education with patient and family re: mobility techniques and frequency to maximize pt function.    Rehab Prognosis:  fair; patient would benefit from acute skilled PT services to address these deficits and reach maximum level of function.      Recent Surgery: * No surgery found *      Plan:     During this hospitalization, patient to be seen 5 x/week to address the above listed problems via gait training, therapeutic activities, therapeutic exercises  · Plan of Care Expires:  05/11/18   Plan of Care Reviewed with: patient, daughter    Subjective     Communicated with RN prior to session.  Patient found supine in bed upon PT entry to room, agreeable to evaluation.      Chief Complaint: L hip pain, SOB  Patient comments/goals: "It feels good to sit up."  Pain/Comfort:  · Pain Rating 1: 7/10  · Location - Side 1: Left  · Location - Orientation 1: generalized  · Location 1: hip  · Pain Addressed 1: Reposition, Distraction, Nurse notified, " Pre-medicate for activity  · Pain Rating Post-Intervention 1: 7/10    Patients cultural, spiritual, Congregation conflicts given the current situation: None    Living Environment:  Pt lives alone but will be moving in with her daughter. The home is a [facility] with [address].   Prior to admission, patients level of function was independent.  Patient has the following equipment: rolling walker. Upon discharge, patient will have assistance from her daughter and hospice staff.    Objective:     Patient found with: central line, oxygen     General Precautions: Standard, fall   Orthopedic Precautions:LLE partial weight bearing   Braces: N/A     Exams:  · Cognitive Exam:  Patient is oriented to Person, Place, Time and Situation   · Gross Motor Coordination:  WFL  · Postural Exam:  Patient presented with the following abnormalities:    · -       Rounded shoulders  · -       Forward head  · -       Posterior pelvic tilt  · -       Kyphosis  · Sensation:    · -       Impaired  light/touch to distal feet and hands (2/2 chemotherapy)  · RUE ROM: WFL  · RUE Strength: 3/5 throughout  · LUE ROM: WFL  · LUE Strength: 3/5 throughout  · RLE ROM: WFL  · RLE Strength: 3/5 throughout  · LLE ROM: WFL  · LLE Strength: 3/5 throughout    Functional Mobility:  · Bed Mobility:  · Rolling Right: maximal assistance with pillow between LEs for comfort  · Pt able to assist with UE reaching across midline  · Nursing student performed pericare and changed brief wit pt in R sidelying  · Scooting: maximal assistance and of 2 persons supine in bed  · Supine to Sit: moderate assistance and of 2 persons with pt able to move LEs toward EOB and push through UEs to scoot to edge  · Sit to Supine: moderate assistance and of 2 persons with pt able to lift LEs onto bed with assist and use UEs to scoot        Therapeutic Activities and Exercises:  - Pt sat EOB x 15 minutes with CGA to SBA for balance  - Cues given for slow, diaphragmatic breathing and frequent rest  breaks taken during mobility due to pt SOB  - Pt and her daughter educated on role of PT in hospital and ways to facilitate mobility    Patient left HOB elevated with all lines intact and call button in reach.    GOALS:    Physical Therapy Goals        Problem: Physical Therapy Goal    Goal Priority Disciplines Outcome Goal Variances Interventions   Physical Therapy Goal     PT/OT, PT Ongoing (interventions implemented as appropriate)     Description:  Goals to be met by: 2018     Patient will increase functional independence with mobility by performin. Supine to sit with MInimal Assistance  2. Sit to supine with MInimal Assistance  3. Sit to stand transfer with Minimal Assistance  4. Bed to chair transfer with Minimal Assistance using Rolling Walker  5. Sitting at edge of bed x15 minutes with Supervision  6. Lower extremity exercise program x10 reps, with assistance as needed                      History:     Past Medical History:   Diagnosis Date    Anemia     Blood transfusion     Cancer     lung cancer    Colon polyps     COPD (chronic obstructive pulmonary disease)     Depression     Insomnia     Vertigo        Past Surgical History:   Procedure Laterality Date    broken jaw      left    CHOLECYSTECTOMY      HYSTERECTOMY      radiation      SKIN BIOPSY      TONSILLECTOMY         Clinical Decision Making:     History  Co-morbidities and personal factors that may impact the plan of care Examination  Body Structures and Functions, activity limitations and participation restrictions that may impact the plan of care Clinical Presentation   Decision Making/ Complexity Score   Co-morbidities:   [] Time since onset of injury / illness / exacerbation  [] Status of current condition  []Patient's cognitive status and safety concerns    [] Multiple Medical Problems (see med hx)  Personal Factors:   [] Patient's age  [] Prior Level of function   [] Patient's home situation (environment and family  support)  [] Patient's level of motivation  [] Expected progression of patient      HISTORY:(criteria)    [] 49599 - no personal factors/history    [] 73090 - has 1-2 personal factor/comorbidity     [] 56970 - has >3 personal factor/comorbidity     Body Regions:  [] Objective examination findings  [] Head     []  Neck  [] Trunk   [] Upper Extremity  [] Lower Extremity    Body Systems:  [] For communication ability, affect, cognition, language, and learning style: the assessment of the ability to make needs known, consciousness, orientation (person, place, and time), expected emotional /behavioral responses, and learning preferences (eg, learning barriers, education  needs)  [] For the neuromuscular system: a general assessment of gross coordinated movement (eg, balance, gait, locomotion, transfers, and transitions) and motor function  (motor control and motor learning)  [] For the musculoskeletal system: the assessment of gross symmetry, gross range of motion, gross strength, height, and weight  [] For the integumentary system: the assessment of pliability(texture), presence of scar formation, skin color, and skin integrity  [] For cardiovascular/pulmonary system: the assessment of heart rate, respiratory rate, blood pressure, and edema     Activity limitations:    [] Patient's cognitive status and saf ety concerns          [] Status of current condition      [] Weight bearing restriction  [] Cardiopulmunary Restriction    Participation Restrictions:   [] Goals and goal agreement with the patient     [] Rehab potential (prognosis) and probable outcome      Examination of Body System: (criteria)    [] 64978 - addressing 1-2 elements    [] 20073 - addressing a total of 3 or more elements     [] 94051 -  Addressing a total of 4 or more elements         Clinical Presentation: (criteria)  Choose one     On examination of body system using standardized tests and measures patient presents with (CHOOSE ONE) elements from  any of the following: body structures and functions, activity limitations, and/or participation restrictions.  Leading to a clinical presentation that is considered (CHOOSE ONE)                              Clinical Decision Making  (Eval Complexity):  Choose One     Time Tracking:     PT Received On: 04/12/18  PT Start Time: 1016     PT Stop Time: 1052  PT Total Time (min): 36 min     Billable Minutes: Evaluation 13 and Therapeutic Activity 23      Jessica LeJeune, PT, DPT

## 2018-04-12 NOTE — PLAN OF CARE
Problem: Patient Care Overview  Goal: Plan of Care Review  Outcome: Ongoing (interventions implemented as appropriate)  Quiet and cooperative. Incontinent of urine. Diaper changed . Complain of pelvic pain while turning.  Premedicated with pain med before diaper change. Fall and injury free this shift. Pt unable to reposition due to pelvic pain.  Vanc trough collected. Result 7.1. Vancomycin dose increased to 1250 mg every 12 hours. Plan of care reviewed with patient.  Understanding voiced. Bed in low position and locked. Side rails up x 2. Call bell in reach. Telemetry

## 2018-04-12 NOTE — SUBJECTIVE & OBJECTIVE
Interval History: Remained stable overnight.     Review of Systems   Constitutional: Negative.    HENT: Negative.    Eyes: Negative.    Respiratory: Negative.    Cardiovascular: Negative.    Gastrointestinal: Negative.    Endocrine: Negative.    Genitourinary: Negative.    Musculoskeletal: Negative.    Neurological: Negative.    Psychiatric/Behavioral: Negative.      Objective:     Vital Signs (Most Recent):  Temp: 97.7 °F (36.5 °C) (04/12/18 1442)  Pulse: 102 (04/12/18 1442)  Resp: 18 (04/12/18 1442)  BP: 133/60 (04/12/18 1442)  SpO2: (!) 89 % (04/12/18 1442) Vital Signs (24h Range):  Temp:  [96.1 °F (35.6 °C)-97.7 °F (36.5 °C)] 97.7 °F (36.5 °C)  Pulse:  [] 102  Resp:  [14-36] 18  SpO2:  [85 %-97 %] 89 %  BP: (105-133)/(53-60) 133/60     Weight: 57.6 kg (126 lb 15.8 oz)  Body mass index is 23.99 kg/m².    Intake/Output Summary (Last 24 hours) at 04/12/18 1858  Last data filed at 04/12/18 1800   Gross per 24 hour   Intake             1700 ml   Output                0 ml   Net             1700 ml      Physical Exam   Constitutional: She appears well-developed and well-nourished.   HENT:   Head: Normocephalic and atraumatic.   Mouth/Throat: Oropharynx is clear and moist.   Eyes: Conjunctivae and EOM are normal. Pupils are equal, round, and reactive to light. Right eye exhibits no discharge. Left eye exhibits no discharge. No scleral icterus.   Neck: Normal range of motion. Neck supple. No JVD present. No tracheal deviation present. No thyromegaly present.   Cardiovascular: Normal rate, regular rhythm, normal heart sounds and intact distal pulses.    Pulmonary/Chest: Effort normal. She has rales.   Abdominal: Soft. Bowel sounds are normal.   Musculoskeletal:   ROM reduced due to pelvic frc   Lymphadenopathy:     She has no cervical adenopathy.   Nursing note and vitals reviewed.      Significant Labs:   CBC:   Recent Labs  Lab 04/11/18  0432 04/12/18  0317   WBC 10.20 9.80   HGB 9.5* 10.3*   HCT 29.4* 32.1*    PLT 47* 49*     CMP:   Recent Labs  Lab 04/11/18  0432 04/12/18  0317    142   K 3.8 4.6    99   CO2 32* 34*   GLU 95 101   BUN 13 13   CREATININE 0.5 0.6   CALCIUM 8.9 9.0   ANIONGAP 8 9   EGFRNONAA >60 >60       Significant Imaging:

## 2018-04-12 NOTE — PLAN OF CARE
Problem: Physical Therapy Goal  Goal: Physical Therapy Goal  Goals to be met by: 2018     Patient will increase functional independence with mobility by performin. Supine to sit with MInimal Assistance  2. Sit to supine with MInimal Assistance  3. Sit to stand transfer with Minimal Assistance  4. Bed to chair transfer with Minimal Assistance using Rolling Walker  5. Sitting at edge of bed x15 minutes with Supervision  6. Lower extremity exercise program x10 reps, with assistance as needed    Outcome: Ongoing (interventions implemented as appropriate)  PT evaluation complete. Recommend hospital bed, wheelchair, bedside commode for home use.

## 2018-04-12 NOTE — PLAN OF CARE
Met with pt and her daughter.  Pt reports that she is not having a good day and that the tubing for the oxygen is irritating her nose.  Option could be a nasal mask however pt stated that would be too constricting.  Discharge plan is for tomorrow per Dr. Gibbs.  Phone contact with Charlene with  who stated that they were planning to deliver the DME later today to pt's home.     11:30 a.m. Amanuel Rene with [facility] stated she would provide auth for the ambulance tomorrow.      04/12/18 0934   Discharge Reassessment   Assessment Type Discharge Planning Reassessment   Discharge Plan A Hospice/home

## 2018-04-12 NOTE — NURSING
Called to room due to pt c/o SOB. O2 sats 85%, P-111, and Resp-36. Called for Respiratory staff and Increased High Flow NC from 7 to 9%. Pt O2 sats went up to 91%, Resp 30, and P-104. Pt settled down and less anxious after increasing O2 rate. Gave report to night shift nurse.

## 2018-04-12 NOTE — PHYSICIAN QUERY
PT Name: Delicia Murray  MR #: [id number]     Physician Query Form - Documentation Clarification      CDS/: Epi Guerrero RN               Contact information:   [email or web address]    This form is a permanent document in the medical record.     Query Date: April 12, 2018    By submitting this query, we are merely seeking further clarification of documentation. Please utilize your independent clinical judgment when addressing the question(s) below.    The Medical record reflects the following:    Supporting Clinical Findings Location in Medical Record   SARAH (iron deficiency anemia)--H&H 8.5/26.0----continue to monitor with serial CBCs  No indication for transfusion at this time without symptoms of anemia/H&H less than 7.0/21.      Platelets are slightly diminished from her previous treatment chemotherapy, which she has just undergone a couple of weeks back.       Platelets: 68,  40,  52,  48,  47,  49-----------------------------------------------------------------------------> H&P 4/7- Canelo      H&P 4/7- Smith        4/7-12  Labs     HH:  8.5/26.0,  6.6/20.5,  10.3/31.0,   10.0/30.6,  9.5/29.4,  10.3/32.1    Transfused : 1 unit RBCs on 4/8    Sed rate: 135  On 4/7  CRP: 259  On 4/7 4/7-12 Labs     Blood Bank    Labs  Labs                                                                              Doctor, do to conflicting documentation, please specify diagnosis    anemia      or diagnoses associated with above clinical findings.    Provider Use Only      [x  ]  Anemia due to chemotherapy    [  ]  Anemia due to iron deficiency    [ x ]  Anemia due to other/specify: _______Multipfactorial, chemotherapy, chornic disease and nutritional as well. ___________    [  ]  Clinically undetermined

## 2018-04-12 NOTE — CONSULTS
HISTORY OF PRESENT ILLNESS:  This patient is an 78-year-old female with a   history of lung cancer who fell about a week ago at home, sustaining an injury   to her left hip area.  She was admitted.    PHYSICAL EXAMINATION:  She has tenderness around the ischial region of the left   hip.  She has no gross deformity of the lower extremity.  It is not externally   rotated or shortened.    A CT scan of her pelvis shows a nondisplaced left inferior pubic rami fracture.    IMPRESSION:  A stable left inferior pubic rami fracture of the pelvis.    RECOMMENDATIONS:  I have no surgical recommendations.  This is a stable injury,   which would heal on its own.  We should get physical therapy to help the patient   with transfers.  She is to start transfers and partial weightbearing as   tolerated.  She can roll from side to side is as tolerated.  As mentioned, I do   not have any surgical recommendations.  The patient from an orthopedic   standpoint can be transferred to home or to hospice and can start partial   weightbearing as tolerated along with transfers.      REANNA  dd: 04/11/2018 16:49:40 (CDT)  td: 04/12/2018 02:05:31 (CDT)  Doc ID   #[id number]  Job ID #[id number]    CC:

## 2018-04-12 NOTE — PHYSICIAN QUERY
PT Name: Delicia Murray  MR #: [id number]    Physician Query Form -Systemic Infectious Process Clarification     CDS/: Epi Guerrero RN               Contact information:   [email or web address]  This form is a permanent document in the medical record.     Query Date: April 12, 2018     By submitting this query, we are merely seeking further clarification of documentation. Please utilize your independent clinical judgment when addressing the question(s) below.    The Medical record contains the following:     Indicators   Supporting Clinical Findings   Location in Medical Record   x HR RR BP Temp Vital Signs (24h Range):  Temp:  [96.5 °F (35.8 °C)-99.1 °F (37.3 °C)] 96.5 °F (35.8 °C)  Pulse:  [108-153] 108  Resp:  [18-60] 18  SpO2:  [88 %-100 %] 95 %  BP: ()/(49-54) 88/52 4/7  H&P- [facility]   x Lactic Acid             Procalcitonin Lactate: 3.3,   2.6,   0.9>>1.0  Procalcitonin:  5.05 4/7-12  Labs  4/7  Labs     x WBC                Bands                     CRP WBC:  25.10,  13.60,  14.10,  12.20,  >>9.80  CRP: 259.5 4/8-12  Labs  4/7  Labs    Culture(s)      AMS, Confusion, LOC, etc.     x Organ Dysfunction / Failure    Principal Problem:Acute on chronic respiratory failure with hypoxia   4/9 Hosp Med    Bacteremia or Sepsis / Septic Sepsis (mild) --Secondary due to community-acquired pneumonia. Lactate trending down.   4/8  Hosp Med    x Known or Suspected Source of Infection documented      (Failed) Outpatient Treatment      Medication      Treatment      Other         Provider, please specify diagnosis      Sepsis     or diagnoses associated with above clinical findings.      [  ] Severe Sepsis with Acute Organ Dysfunction/Failure (please specify organ dysfunction/failure): ___________________  [  x] Other Infectious Disease (please specify): _______Pneumonoia with hypoxic respiratroy failure __________________________  [  ] Other: __________________________________  [   Clinically  Undetermined    Please document in your progress notes daily for the duration of treatment until resolved and include in your discharge summary.

## 2018-04-12 NOTE — PLAN OF CARE
Problem: Patient Care Overview  Goal: Plan of Care Review  Outcome: Ongoing (interventions implemented as appropriate)  POC reviewed with pt's and family verbalized understanding. Side rail x 2. Call light within reach. C/o pain, treated as order. Denies SOB. Skin care provided. Incontinence care provided. Still on high flow O2 SPO2 93-94%. Will continue to monitor

## 2018-04-12 NOTE — PHYSICIAN QUERY
PT Name: Delicia Murray  MR #: [id number]     Physician Query Form - Documentation Clarification      CDS/: Epi Guerrero               Contact information:   [email or web address]  This form is a permanent document in the medical record.     Query Date: April 12, 2018    By submitting this query, we are merely seeking further clarification of documentation. Please utilize your independent clinical judgment when addressing the question(s) below.    The Medical record reflects the following:    Supporting Clinical Findings Location in Medical Record     A CT scan of her pelvis shows a nondisplaced left inferior pubic rami fracture.    IMPRESSION:  A stable left inferior pubic rami fracture of the pelvis....    ...Daughter does understand this, but Dr. Peck has discussed with Dr. Edwards Orthopedic Surgery as well as to what else can be  done about the hip and verbally and being told that this was that is really not  much that can be done at this point because of her overall performance status....      4/11 Consult - Nilesh        4/7 H&P-- Ceballos   Malignant neoplasm of upper lobe of right lung    Closed pelvic fracture:  -Not a candidate for orthopedic intervention.   -Changing position is painful. -Plan for concservative management without need for intervention.      4/7  H&P - Canelo    4/11 [facility]                                                                                Doctor, Please specify diagnosis    Left inferior pubic rami fracture      or diagnoses associated with above clinical findings.    Provider Use Only      [x  ] Traumatic Left inferior pubic rami fracture    [  ]  Pathologic  Left inferior pubic rami fracture    [  ]  Other/specify: ____Pt reported to have a fall. ____    [  ]  Clinically undetermined

## 2018-04-12 NOTE — NURSING
Dr. Gutiérrez told this nurse the POC for pt is to go home on hospice either today or tomorrow, first needing to titrate O2 down to target % of 2l; D/C Oliva cath, will use lasix to decrease fluid to help breath better, and to get recommendations from Ortho for PT/OT and positioning for pt especially with fractured left pelvis and going home on hospice. Went to D/C Oliva cath ane reported to pt and family at bedside about going home on hospice either today or tomorrow and they said that would not work and demanded that they speak to the MD again because they told him they would not be ready with preparations for pt to come home until Friday. Called MD back and MD reported that would be fine because we still have to titrate her O2 down to 2l. Reported back to family and they were ok.

## 2018-04-13 VITALS
RESPIRATION RATE: 1 BRPM | WEIGHT: 127 LBS | BODY MASS INDEX: 23.98 KG/M2 | HEIGHT: 61 IN | TEMPERATURE: 97 F | OXYGEN SATURATION: 81 % | SYSTOLIC BLOOD PRESSURE: 118 MMHG | DIASTOLIC BLOOD PRESSURE: 55 MMHG | HEART RATE: 107 BPM

## 2018-04-13 LAB
ANION GAP SERPL CALC-SCNC: 8 MMOL/L
BASOPHILS # BLD AUTO: 0 K/UL
BASOPHILS NFR BLD: 0 %
BUN SERPL-MCNC: 9 MG/DL
CALCIUM SERPL-MCNC: 9 MG/DL
CHLORIDE SERPL-SCNC: 102 MMOL/L
CO2 SERPL-SCNC: 32 MMOL/L
CREAT SERPL-MCNC: 0.5 MG/DL
DIFFERENTIAL METHOD: ABNORMAL
EOSINOPHIL # BLD AUTO: 0.2 K/UL
EOSINOPHIL NFR BLD: 2.2 %
ERYTHROCYTE [DISTWIDTH] IN BLOOD BY AUTOMATED COUNT: 17.5 %
EST. GFR  (AFRICAN AMERICAN): >60 ML/MIN/1.73 M^2
EST. GFR  (NON AFRICAN AMERICAN): >60 ML/MIN/1.73 M^2
GLUCOSE SERPL-MCNC: 104 MG/DL
HCT VFR BLD AUTO: 29.7 %
HGB BLD-MCNC: 9.9 G/DL
LACTATE SERPL-SCNC: 1 MMOL/L
LYMPHOCYTES # BLD AUTO: 1.1 K/UL
LYMPHOCYTES NFR BLD: 10.1 %
MAGNESIUM SERPL-MCNC: 1.3 MG/DL
MCH RBC QN AUTO: 29.6 PG
MCHC RBC AUTO-ENTMCNC: 33.2 G/DL
MCV RBC AUTO: 89 FL
MONOCYTES # BLD AUTO: 0 K/UL
MONOCYTES NFR BLD: 0.3 %
NEUTROPHILS # BLD AUTO: 9.4 K/UL
NEUTROPHILS NFR BLD: 87.4 %
PHOSPHATE SERPL-MCNC: 2.7 MG/DL
PLATELET # BLD AUTO: 55 K/UL
PMV BLD AUTO: 8.1 FL
POTASSIUM SERPL-SCNC: 4.4 MMOL/L
RBC # BLD AUTO: 3.33 M/UL
SODIUM SERPL-SCNC: 142 MMOL/L
VANCOMYCIN TROUGH SERPL-MCNC: 17.2 UG/ML
WBC # BLD AUTO: 10.8 K/UL

## 2018-04-13 PROCEDURE — 36415 COLL VENOUS BLD VENIPUNCTURE: CPT

## 2018-04-13 PROCEDURE — 99900035 HC TECH TIME PER 15 MIN (STAT)

## 2018-04-13 PROCEDURE — 83735 ASSAY OF MAGNESIUM: CPT

## 2018-04-13 PROCEDURE — 25000003 PHARM REV CODE 250: Performed by: EMERGENCY MEDICINE

## 2018-04-13 PROCEDURE — 84100 ASSAY OF PHOSPHORUS: CPT

## 2018-04-13 PROCEDURE — 94761 N-INVAS EAR/PLS OXIMETRY MLT: CPT

## 2018-04-13 PROCEDURE — 25000003 PHARM REV CODE 250: Performed by: HOSPITALIST

## 2018-04-13 PROCEDURE — 63600175 PHARM REV CODE 636 W HCPCS: Performed by: INTERNAL MEDICINE

## 2018-04-13 PROCEDURE — 80202 ASSAY OF VANCOMYCIN: CPT

## 2018-04-13 PROCEDURE — 83605 ASSAY OF LACTIC ACID: CPT

## 2018-04-13 PROCEDURE — 80048 BASIC METABOLIC PNL TOTAL CA: CPT

## 2018-04-13 PROCEDURE — 27000221 HC OXYGEN, UP TO 24 HOURS

## 2018-04-13 PROCEDURE — 25000003 PHARM REV CODE 250: Performed by: INTERNAL MEDICINE

## 2018-04-13 PROCEDURE — 85025 COMPLETE CBC W/AUTO DIFF WBC: CPT

## 2018-04-13 PROCEDURE — 94640 AIRWAY INHALATION TREATMENT: CPT

## 2018-04-13 RX ORDER — VANCOMYCIN/0.9 % SOD CHLORIDE 1 G/100 ML
1000 PLASTIC BAG, INJECTION (ML) INTRAVENOUS
Status: DISCONTINUED | OUTPATIENT
Start: 2018-04-13 | End: 2018-04-13

## 2018-04-13 RX ORDER — IPRATROPIUM BROMIDE AND ALBUTEROL SULFATE 2.5; .5 MG/3ML; MG/3ML
3 SOLUTION RESPIRATORY (INHALATION) EVERY 6 HOURS PRN
Qty: 1 BOX | Refills: 0 | Status: SHIPPED | OUTPATIENT
Start: 2018-04-13 | End: 2018-04-13 | Stop reason: SDUPTHER

## 2018-04-13 RX ORDER — ALPRAZOLAM 0.25 MG/1
0.25 TABLET ORAL ONCE
Status: COMPLETED | OUTPATIENT
Start: 2018-04-13 | End: 2018-04-13

## 2018-04-13 RX ORDER — HEPARIN 100 UNIT/ML
5 SYRINGE INTRAVENOUS ONCE AS NEEDED
Status: COMPLETED | OUTPATIENT
Start: 2018-04-13 | End: 2018-04-13

## 2018-04-13 RX ORDER — IPRATROPIUM BROMIDE AND ALBUTEROL SULFATE 2.5; .5 MG/3ML; MG/3ML
SOLUTION RESPIRATORY (INHALATION)
Qty: 1080 ML | Refills: 0 | Status: SHIPPED | OUTPATIENT
Start: 2018-04-13

## 2018-04-13 RX ORDER — LANOLIN ALCOHOL/MO/W.PET/CERES
800 CREAM (GRAM) TOPICAL 2 TIMES DAILY
Qty: 30 TABLET | Refills: 0 | Status: SHIPPED | OUTPATIENT
Start: 2018-04-13

## 2018-04-13 RX ORDER — CIPROFLOXACIN 500 MG/1
500 TABLET ORAL EVERY 12 HOURS
Qty: 5 TABLET | Refills: 0 | Status: SHIPPED | OUTPATIENT
Start: 2018-04-13 | End: 2018-04-15

## 2018-04-13 RX ADMIN — HYDROCODONE BITARTRATE AND ACETAMINOPHEN 1 TABLET: 5; 325 TABLET ORAL at 02:04

## 2018-04-13 RX ADMIN — VANCOMYCIN HCL-SODIUM CHLORIDE IV SOLN 1 GM/250ML-0.9% 1 G: 1-0.9/25 SOLUTION at 06:04

## 2018-04-13 RX ADMIN — HYDROCODONE BITARTRATE AND ACETAMINOPHEN 1 TABLET: 5; 325 TABLET ORAL at 08:04

## 2018-04-13 RX ADMIN — PANTOPRAZOLE SODIUM 40 MG: 40 TABLET, DELAYED RELEASE ORAL at 08:04

## 2018-04-13 RX ADMIN — ALPRAZOLAM 0.25 MG: 0.25 TABLET ORAL at 02:04

## 2018-04-13 RX ADMIN — ALPRAZOLAM 0.25 MG: 0.25 TABLET ORAL at 08:04

## 2018-04-13 RX ADMIN — HEPARIN 500 UNITS: 100 SYRINGE at 01:04

## 2018-04-13 RX ADMIN — ASPIRIN 81 MG CHEWABLE TABLET 81 MG: 81 TABLET CHEWABLE at 08:04

## 2018-04-13 RX ADMIN — HYDROCODONE BITARTRATE AND ACETAMINOPHEN 1 TABLET: 5; 325 TABLET ORAL at 03:04

## 2018-04-13 RX ADMIN — CIPROFLOXACIN 500 MG: 500 TABLET, FILM COATED ORAL at 08:04

## 2018-04-13 NOTE — DISCHARGE SUMMARY
[facility] Hospital Medicine  Discharge Summary      Patient Name: Delicia Murray  MRN: [id number]  Admission Date: 4/7/2018  Hospital Length of Stay: 6 days  Discharge Date and Time: No discharge date for patient encounter.  Attending Physician: Kimber Gibbs MD   Discharging Provider: Kimber Gibbs MD  Primary Care Provider: Mono Duncan MD        HPI:   History was obtained from the patient who is an adequate historian. Ms. Murray is an 78-year-old  female with a significant past medical history which includes malignant neoplasm of upper lobe of right long in which patient is currently undergoing chemotherapy, iron deficiency anemia, depression, COPD, and pulmonary fibrosis who presented to the emergency department after experiencing shortness of breath the morning of admission to the hospital.   Symptoms have been aggravated due to a cough that the patient has recently developed issues with.  Patient had a productive cough of whitish sputum for a couple of months.  Patient is on chemotherapy for right lung are [place] and last had chemotherapy on March 13, 2018.  She was followed by Dr. Ceballos with heme/oncology for this condition.  Currently, patient has other respiratory issues including COPD and pulmonary fibrosis and is on chronic oxygen therapy by nasal cannula for this.  She did report subjective fevers/chills over the last 24 hours.  Her shortness of breath is aggravated with activity and cough.  She has not taken any medications for her cough.       * No surgery found *      Hospital Course: Pt was started on empiric antibiotics, and her blood cultures on 4/7 showed GPC in chain, and also UTI with E. Coli. Pt was transitioned to antibiotics sensitive to Vancomycin and started on Ciprofloxacin for UTI. The repeat blood cultures were negative. The UTI is uncomplicated. Will continue two more days of Ciprofloxacin.   Pt symptoms of shortnss of breath, cough, improved, white count  trended down and her overall well being improved.         Consults:   Consults         Status Ordering Provider     Inpatient consult to Oncology  Once     Provider:  Brianna Ceballos MD    Acknowledged GERMÁN LEACH     Inpatient consult to Orthopedic Surgery  Once     Provider:  Thad Galloway MD    Acknowledged CONRAD KNUTSON     Inpatient consult to Social Work/Case Management  Once     Provider:  (Not yet assigned)    Completed CONRAD KNUTSON     Inpatient consult to Social Work/Case Management  Once     Provider:  (Not yet assigned)    Completed CONRAD KNUTSON          Final Active Diagnoses:    Diagnosis Date Noted POA    PRINCIPAL PROBLEM:  Acute on chronic respiratory failure with hypoxia [J96.21] 04/07/2018 Yes    Discharge planning issues [Z02.9] 04/11/2018 Not Applicable    Streptococcal pneumonia [J15.4] 04/10/2018 Yes    Closed pelvic fracture [S32.9XXA] 04/09/2018 Yes    Malignant neoplasm of upper lobe of right lung [C34.11] 12/01/2016 Yes    SARAH (iron deficiency anemia) [D50.9] 09/04/2015 Yes     Chronic    Episode of recurrent major depressive disorder [F33.9] 10/18/2013 Yes      Problems Resolved During this Admission:    Diagnosis Date Noted Date Resolved POA    Moderate malnutrition [E44.0] 04/10/2018 04/11/2018 Yes    Severe malnutrition [E43] 04/07/2018 04/11/2018 Yes    Hypokalemia [E87.6] 05/30/2014 04/11/2018 Yes      Discharged Condition: stable    Disposition: Hospice/Medical Facility    Follow Up:  Follow-up Information     Mono Duncan MD.    Specialty:  Family Medicine  Why:  As needed  Contact information:  [address] [facility] [id number] [phone]                 Patient Instructions:     Diet Adult Regular     Weight bearing restrictions (specify)     Other restrictions (specify):     Notify your health care provider if you experience any of the following:  temperature >100.4     Notify your health care provider if you experience any of the following:  persistent  dizziness, light-headedness, or visual disturbances       Medications:  Reconciled Home Medications:      Medication List      START taking these medications    albuterol-ipratropium 2.5mg-0.5mg/3mL 0.5 mg-3 mg(2.5 mg base)/3 mL nebulizer solution  Commonly known as:  DUO-NEB  Take 3 mLs by nebulization every 6 (six) hours as needed for Wheezing or Shortness of Breath. Rescue     ciprofloxacin HCl 500 MG tablet  Commonly known as:  CIPRO  Take 1 tablet (500 mg total) by mouth every 12 (twelve) hours.     magnesium oxide 400 mg tablet  Commonly known as:  MAG-OX  Take 2 tablets (800 mg total) by mouth 2 (two) times daily.        CHANGE how you take these medications    * sertraline 100 MG tablet  Commonly known as:  ZOLOFT  Take 1 tablet (100 mg total) by mouth every evening.  What changed:  Another medication with the same name was added. Make sure you understand how and when to take each.     * sertraline 100 MG tablet  Commonly known as:  ZOLOFT  TAKE 1 TABLET BY MOUTH EVERY EVENING  What changed:  You were already taking a medication with the same name, and this prescription was added. Make sure you understand how and when to take each.        * This list has 2 medication(s) that are the same as other medications prescribed for you. Read the directions carefully, and ask your doctor or other care provider to review them with you.            CONTINUE taking these medications    acetaminophen 325 MG tablet  Commonly known as:  TYLENOL  Take 325 mg by mouth every 6 (six) hours as needed.     albuterol 90 mcg/actuation inhaler  2 puffs every 4 hours as needed for cough, wheeze, or shortness of breath     ALPRAZolam 0.25 MG tablet  Commonly known as:  XANAX  TAKE 1 TABLET BY MOUTH THREE TIMES DAILY AS NEEDED     aspirin 81 MG Chew  Take 81 mg by mouth once daily.     hydrocodone-acetaminophen 5-325mg 5-325 mg per tablet  Commonly known as:  NORCO  Take 1 tablet by mouth every 4 (four) hours as needed for Pain.      mirtazapine 30 MG tablet  Commonly known as:  REMERON  TAKE 1 TABLET(30 MG) BY MOUTH EVERY EVENING     ondansetron 8 MG Tbdl  Commonly known as:  ZOFRAN-ODT  Take 1 tablet (8 mg total) by mouth every 12 (twelve) hours as needed.     pantoprazole 40 MG tablet  Commonly known as:  PROTONIX  TAKE 1 TABLET(40 MG) BY MOUTH TWICE DAILY     prochlorperazine 10 MG tablet  Commonly known as:  COMPAZINE  TAKE 1 TABLET BY MOUTH EVERY 6 HOURS     umeclidinium-vilanterol 62.5-25 mcg/actuation Dsdv  Commonly known as:  ANORO ELLIPTA  Inhale 1 puff into the lungs once daily.        STOP taking these medications    lidocaine-prilocaine cream  Commonly known as:  EMLA            Significant Diagnostic Studies: Labs:   CMP   Recent Labs  Lab 04/12/18 0317 04/13/18  0448    142   K 4.6 4.4   CL 99 102   CO2 34* 32*    104   BUN 13 9   CREATININE 0.6 0.5   CALCIUM 9.0 9.0   ANIONGAP 9 8   ESTGFRAFRICA >60 >60   EGFRNONAA >60 >60    and CBC   Recent Labs  Lab 04/12/18 0317 04/13/18  0448   WBC 9.80 10.80   HGB 10.3* 9.9*   HCT 32.1* 29.7*   PLT 49* 55*       Pending Diagnostic Studies:     None        Indwelling Lines/Drains at time of discharge:   Lines/Drains/Airways     Central Venous Catheter Line                 Port A Cath Single Lumen 09/27/17 0813 right subclavian 198 days         Port A Cath Single Lumen 04/07/18 2145 right subclavian 5 days                Time spent on the discharge of patient: 35 minutes  Patient was seen and examined on the date of discharge and determined to be suitable for discharge.         Kimber Gibbs MD  Department of Hospital Medicine  [facility]

## 2018-04-13 NOTE — PLAN OF CARE
Notified Charlene at [facility], [phone] that I was faxing the orders to her.  Plan for her to call me after they review and orders and okay to call the ambulance.  She stated that pt's daughter was going to call them when pt was at home. Faxed the AVS and discharge order through [facility] to [facility].      04/13/18 2944   Discharge Reassessment   Assessment Type Discharge Planning Reassessment   Discharge Plan A Hospice/home

## 2018-04-13 NOTE — PLAN OF CARE
To be discharged to hospice today.  Sacral area friction/DTI appears to be healing well. Discussed home care with family member, MD, and primary nurse at bedside. Recommend gentle cleansing of area and application of Critic Aid Clear Skin protectant cream.  Extra tube given to family member.

## 2018-04-13 NOTE — PT/OT/SLP PROGRESS
Physical Therapy      Patient Name:  Delicia Murray   MRN:  [id number]    Patient not seen today secondary to Patient fatigue, Patient unwilling to participate (pt reported she was having a rough morning and was having some trouble breathing. Pt declined mobility.). Will follow-up .    Nydia Lr, PTA

## 2018-04-13 NOTE — ASSESSMENT & PLAN NOTE
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 No acute treatment indicated at this time.  -Pt to go on home hospice as per family recommendation.

## 2018-04-13 NOTE — PLAN OF CARE
Discharged patient in stable condition, transported via ambulance. De-access the port, no bleeding noted.

## 2018-04-13 NOTE — MEDICAL/APP STUDENT
Progress Note  Hospital Medicine    Patient Name: Delicia Murray  YOB: 1940    Admit Date: 4/7/2018                     LOS: 5    SUBJECTIVE:     Reason for Admission:  Acute on chronic respiratory failure with hypoxia  See H&P for detailed presentating history and ROS.      Interval history: ***      OBJECTIVE:     Vital Signs Range (Last 24H):  Temp:  [96.5 °F (35.8 °C)-97.7 °F (36.5 °C)]   Pulse:  []   Resp:  [14-22]   BP: (105-133)/(54-60)   SpO2:  [89 %-97 %] Body mass index is 23.99 kg/m².  Wt Readings from Last 1 Encounters:   04/10/18 1214 57.6 kg (126 lb 15.8 oz)   04/07/18 0525 57.6 kg (127 lb)       I & O (Last 24H):  Intake/Output Summary (Last 24 hours) at 04/12/18 2334  Last data filed at 04/12/18 1800   Gross per 24 hour   Intake             1460 ml   Output                0 ml   Net             1460 ml       Physical Exam:  {Exam:[id number]}    Diagnostic Results:  Lab Results   Component Value Date    WBC 9.80 04/12/2018    HGB 10.3 (L) 04/12/2018    HCT 32.1 (L) 04/12/2018    MCV 90 04/12/2018    PLT 49 (L) 04/12/2018       Recent Labs  Lab 04/12/18  0317         K 4.6   CL 99   CO2 34*   BUN 13   CREATININE 0.6   CALCIUM 9.0   MG 1.1*     Lab Results   Component Value Date    INR 1.1 04/07/2018    INR 0.9 08/28/2017    INR 0.9 12/01/2016     Lab Results   Component Value Date    HGBA1C 4.9 04/07/2018     Recent Labs      04/12/18   0846   POCTGLUCOSE  125*       ASSESSMENT/PLAN:     Active Hospital Problems    Diagnosis  POA    *Acute on chronic respiratory failure with hypoxia [J96.21]  Yes    Discharge planning issues [Z02.9]  Not Applicable    Streptococcal pneumonia [J15.4]  Yes    Closed pelvic fracture [S32.9XXA]  Yes    Malignant neoplasm of upper lobe of right lung [C34.11]  Yes    SARAH (iron deficiency anemia) [D50.9]  Yes     Chronic    Episode of recurrent major depressive disorder [F33.9]  Yes      Resolved Hospital Problems    Diagnosis Date  Resolved POA    Moderate malnutrition [E44.0] 04/11/2018 Yes    Severe malnutrition [E43] 04/11/2018 Yes    Hypokalemia [E87.6] 04/11/2018 Yes         High Risk Conditions:  {HIGH RISK CONDITIONS:12148}    Problems Addressed Today:      DISCHARGE PLANNING:    TIME SPENT: I spent ***minutes evaluating, treating, counseling, and coordinating care for the patient.    Signing Physician:  Kenny Mendoza

## 2018-04-13 NOTE — PLAN OF CARE
04/13/18 1057   Final Note   Assessment Type Final Discharge Note   Discharge Disposition HospiceHome  ()   What phone number can be called within the next 1-3 days to see how you are doing after discharge? ([phone])   Hospital Follow Up  Appt(s) scheduled? No  (hospice physician will follow up with pt)

## 2018-04-13 NOTE — CONSULTS
Delicia Murray [id number] is a 78 y.o. female who has been consulted for vancomycin dosing.    Pharmacy consult for vancomycin dosing in no longer required.  Vancomycin was discontinued.    Thank you for allowing us to participate in this patient's care.     -Osman Collins, PharmD

## 2018-04-13 NOTE — PLAN OF CARE
Well known to wound care. Right plantar DFU; debridement by Dr Santiago 2 weeks ago, appears clean and without periwound redness. Left plantar DFU appears stable, also.  Discussed plan of care with Dr Santiago, who will enter wound care orders.  Also of concern is bilateral anterior tibial redness and small blisters : venous stasis dermatitis? Patient encouraged to keep legs elevated as much as possible to help reduce edema.

## 2018-04-13 NOTE — PLAN OF CARE
Charlene at [facility] stated they were waiting for pt's medications to arrive.  When they have the meds she will call pt's nurse Gene to inform her it is okay to call the ambulance.  I updated Gene who verified she had the PHN auth number for the ambulance.     04/13/18 6052   Final Note   Assessment Type Discharge Planning Reassessment   Discharge Disposition [facility]

## 2018-04-13 NOTE — PROGRESS NOTES
[facility] Hospital Medicine  Progress Note    Patient Name: Delicia Murray  MRN: [id number]  Patient Class: IP- Inpatient   Admission Date: 4/7/2018  Length of Stay: 5 days  Attending Physician: Kimber Gibbs MD  Primary Care Provider: Mono Duncan MD        Subjective:     Principal Problem:Acute on chronic respiratory failure with hypoxia    HPI:  History was obtained from the patient who is an adequate historian. Ms. 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  Symptoms have been aggravated due to a cough that the patient has recently developed issues with.  Patient has had a productive cough of whitish sputum for a couple of months.  Patient undergoes chemotherapy for right lung are [place] and last had chemotherapy on March 13, 2018.  She is followed by Dr. Ceballos with heme/oncology for this condition.  Currently, patient has other respiratory issues including COPD and pulmonary fibrosis and is on chronic oxygen therapy by nasal cannula for this.  She does report subjective fevers/chills over the last 24 hours.  Her shortness of breath is aggravated with activity and cough.  She has not taken any medications for her cough.    Hospital Course:  - Pt remained stable overnight  - Pt denied CP, SOB or palpitation.   - Pt reported feeling better overall.     Interval History: Remained stable overnight.     Review of Systems   Constitutional: Negative.    HENT: Negative.    Eyes: Negative.    Respiratory: Negative.    Cardiovascular: Negative.    Gastrointestinal: Negative.    Endocrine: Negative.    Genitourinary: Negative.    Musculoskeletal: Negative.     Neurological: Negative.    Psychiatric/Behavioral: Negative.      Objective:     Vital Signs (Most Recent):  Temp: 97.7 °F (36.5 °C) (04/12/18 1442)  Pulse: 102 (04/12/18 1442)  Resp: 18 (04/12/18 1442)  BP: 133/60 (04/12/18 1442)  SpO2: (!) 89 % (04/12/18 1442) Vital Signs (24h Range):  Temp:  [96.1 °F (35.6 °C)-97.7 °F (36.5 °C)] 97.7 °F (36.5 °C)  Pulse:  [] 102  Resp:  [14-36] 18  SpO2:  [85 %-97 %] 89 %  BP: (105-133)/(53-60) 133/60     Weight: 57.6 kg (126 lb 15.8 oz)  Body mass index is 23.99 kg/m².    Intake/Output Summary (Last 24 hours) at 04/12/18 1858  Last data filed at 04/12/18 1800   Gross per 24 hour   Intake             1700 ml   Output                0 ml   Net             1700 ml      Physical Exam   Constitutional: She appears well-developed and well-nourished.   HENT:   Head: Normocephalic and atraumatic.   Mouth/Throat: Oropharynx is clear and moist.   Eyes: Conjunctivae and EOM are normal. Pupils are equal, round, and reactive to light. Right eye exhibits no discharge. Left eye exhibits no discharge. No scleral icterus.   Neck: Normal range of motion. Neck supple. No JVD present. No tracheal deviation present. No thyromegaly present.   Cardiovascular: Normal rate, regular rhythm, normal heart sounds and intact distal pulses.    Pulmonary/Chest: Effort normal. She has rales.   Abdominal: Soft. Bowel sounds are normal.   Musculoskeletal:   ROM reduced due to pelvic frc   Lymphadenopathy:     She has no cervical adenopathy.   Nursing note and vitals reviewed.      Significant Labs:   CBC:   Recent Labs  Lab 04/11/18 0432 04/12/18 0317   WBC 10.20 9.80   HGB 9.5* 10.3*   HCT 29.4* 32.1*   PLT 47* 49*     CMP:   Recent Labs  Lab 04/11/18 0432 04/12/18 0317    142   K 3.8 4.6    99   CO2 32* 34*   GLU 95 101   BUN 13 13   CREATININE 0.5 0.6   CALCIUM 8.9 9.0   ANIONGAP 8 9   EGFRNONAA >60 >60       Significant Imaging:     Assessment/Plan:      * Acute on chronic respiratory  failure with hypoxia    -Improving clinical status.   - However precipitating factors still present without immediate treatment for stage IV lung tumor.   - Will plan to treat conservatively with PRN breathing treatment           Discharge planning issues      - Pt to be discharged home with home hospice.   - Predischarge issue with placement, proper care for urine disposition and handing of the patient with pelvic fracture to be addressed.   - Ortho to give recommendations regarding the out of hospital management.         Streptococcal pneumonia      - Sensitivity checked and continue with vancomycin.         Closed pelvic fracture    -Not a candidate for orthopedic intervention.   -Changing position is painful.   -Plan for concservative management without need for intervention.           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 No acute treatment indicated at this time.  -Pt to go on home hospice as per family recommendation.             SARAH (iron deficiency anemia)    Patient's anemia is currently controlled. S/p 0 units of PRBCs. 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  Current CBC reviewed-   Lab Results   Component Value Date    HGB 9.5 (L) 04/11/2018    HCT 29.4 (L) 04/11/2018    MCV 90 04/11/2018    PLT 47 (L) 04/11/2018     Monitor serial CBC and transfuse if patient becomes hemodynamically unstable, symptomatic or H/H drops below 7/21.           Episode of recurrent major depressive disorder    No change in clinical status.   Chronic problem, uncontrolled. Will continue chronic medication(s), SSRI and monitor for any changes, adjusting as needed. Consult .            VTE Risk Mitigation         Ordered     enoxaparin injection 40 mg  Daily     Route:  Subcutaneous        04/10/18 0918     IP VTE HIGH RISK PATIENT  Once      04/07/18 0949              Kimber Gibbs MD  Department of Hospital  Medicine   [facility]

## 2018-04-13 NOTE — CONSULTS
Delicia Murray [id number] is a 78 y.o. female who has been consulted for vancomycin dosing.    The patient has the following labs:     Date Creatinine (mg/dl)    BUN WBC Count   4/13/2018 Estimated Creatinine Clearance: 73.2 mL/min (based on SCr of 0.5 mg/dL). Lab Results   Component Value Date    BUN 9 04/13/2018     Lab Results   Component Value Date    WBC 10.80 04/13/2018        Current weight is 57.6 kg (126 lb 15.8 oz)    Pt is receiving vancomycin 1250 mg every 12 hours.  Vancomycin trough from 4/13 at 0448 was 17.2 mg/dL.  Target trough range is 15-20 mg/dL.   Trough was drawn on time and anticipate it is therapeutic. Pharmacy will decrease current dose.   The patient will be changed to a vancomycin dose of 1000 mg every 12 hours. A vancomycin trough has been ordered for 4/14 at 0600.      Patient will be followed by pharmacy for changes in renal function, toxicity, and efficacy.  Thank you for allowing us to participate in this patient's care.     Birgit Hernandez

## 2018-04-13 NOTE — ASSESSMENT & PLAN NOTE
No change in clinical status.   Chronic problem, uncontrolled. Will continue chronic medication(s), SSRI and monitor for any changes, adjusting as needed. Consult .

## 2018-04-13 NOTE — PROGRESS NOTES
"  [facility]  Adult Nutrition  Consult Note    SUMMARY     Recommendations  Recommendation/Intervention: 1.) Continue with regular diet 2.) Honor food preferences as able   Goals: 1.) PO intake >/= 50% while admitted  Nutrition Goal Status: 1.) progressing toward goal  Communication of RD Recs: other (comment) (care plan)     1. COPD with exacerbation    2. Tachycardia    3. SOB (shortness of breath)    4. Acute exacerbation of chronic obstructive pulmonary disease (COPD)      Past Medical History:   Diagnosis Date    Anemia     Blood transfusion     Cancer     lung cancer    Colon polyps     COPD (chronic obstructive pulmonary disease)     Depression     Insomnia     Vertigo          Reason for Assessment  Reason for Assessment: identified at risk by screening criteria  Interdisciplinary Rounds: attended  General Information Comments: Admits with sepsis and SOB. Alert with family at bedside. Per rounds, discussing hospice. Weight loss reported since November of 2017. Consumed approx 25% at breakfast. Tolerated well. decreased appetite reported. Denies nausea/vomiting. Mild muscle loss noted in some angling of shoulders, mild scooping of temples    RD f/u: patient discharging today with hospice.     Nutrition Risk Screen  Nutrition Risk Screen: no indicators present    Nutrition/Diet History  Food Preferences: wants ice cream, coke, and fruit.   Do you have any cultural, spiritual, Orthodoxy conflicts, given your current situation?: None  Food Allergies: NKFA  Factors Affecting Nutritional Intake: decreased appetite    Anthropometrics  Temp: 97.4 °F (36.3 °C)  Height Method: Stated  Height: 5' 1"  Height (inches): 61 in  Weight Method: Stated  Weight: 57.6 kg (126 lb 15.8 oz)  Weight (lb): 126.99 lb  Ideal Body Weight (IBW), Female: 105 lb  % Ideal Body Weight, Female (lb): 120.94 lb  BMI (Calculated): 24  BMI Grade: 18.5-24.9 - normal  Weight Loss: unintentional (in 6 months)  Usual Body " Weight (UBW), k.45 kg  % Usual Body Weight: 88.19  % Weight Change From Usual Weight: -11.99 %       Lab/Procedures/Meds  Pertinent Labs Reviewed: reviewed  BMP  Lab Results   Component Value Date     2018    K 4.4 2018     2018    CO2 32 (H) 2018    BUN 9 2018    CREATININE 0.5 2018    CALCIUM 9.0 2018    ANIONGAP 8 2018    ESTGFRAFRICA >60 2018    EGFRNONAA >60 2018     Lab Results   Component Value Date    ALBUMIN 2.1 (L) 2018     Lab Results   Component Value Date    CALCIUM 9.0 2018    PHOS 2.7 2018     No results for input(s): POCTGLUCOSE in the last 24 hours.    Pertinent Medications Reviewed: reviewed  Scheduled Meds:   aspirin  81 mg Oral Daily    ciprofloxacin HCl  500 mg Oral Q12H    enoxaparin  40 mg Subcutaneous Daily    mirtazapine  30 mg Oral QHS    pantoprazole  40 mg Oral Daily    sertraline  100 mg Oral QHS    umeclidinium-vilanterol  1 puff Inhalation Daily    vancomycin (VANCOCIN) IVPB  1,000 mg Intravenous Q12H         Physical Findings/Assessment  Overall Physical Appearance: loss of muscle mass  Oral/Mouth Cavity: WDL  Skin: intact (kaleigh score 19)    Estimated/Assessed Needs  Weight Used For Calorie Calculations: 57.6 kg (126 lb 15.8 oz)  Energy Calorie Requirements (kcal): 2914-0641  Energy Need Method: [place] (x1.3)  Protein Requirements: 60-70 g/day (1-1.2 g/kg)  Weight Used For Protein Calculations: 57.6 kg (126 lb 15.8 oz)  Fluid Need Method: RDA Method  RDA Method (mL): 1300      Nutrition Prescription Ordered  Current Diet Order: Adult regular diet     Evaluation of Received Nutrient/Fluid Intake  Oral Fluid (mL): 900  Energy Calories Required: not meeting needs  Tolerance: tolerating  % Intake of Estimated Energy Needs: 25 - 50 %  % Meal Intake: 25 - 50 %    Nutrition Risk  Level of Risk/Frequency of Follow-up:  (x2 weekly)     Assessment and Plan  Moderate malnutrition         Related to (etiology):   Inadequate energy intake/chemo     Signs and Symptoms (as evidenced by):   11% weight loss in 6 months   Mild muscle depletion in angling of shoulders, scooping of temples     Interventions/Recommendations (treatment strategy):  Continue with diet      Nutrition Diagnosis Status:   Continues                Monitor and Evaluation  Food and Nutrient Intake: energy intake, food and beverage intake  Food and Nutrient Adminstration: diet order  Anthropometric Measurements: weight, weight change, body mass index  Biochemical Data, Medical Tests and Procedures: electrolyte and renal panel, glucose/endocrine profile, lipid profile  Nutrition-Focused Physical Findings: skin     Nutrition Follow-Up  RD Follow-up?: Yes     Nutrition discharge planning: regular diet on hospice.

## 2018-04-13 NOTE — NURSING
"Pain meds and xanax given. Daughter was upset stated " its late now because the ambulance is here".   "

## 2018-04-13 NOTE — NURSING
Informed the pt's and daughter the pt's can't get the xanax because its 3x  Prn meds and its not yet due. Both of them got agitated and tried to explained to them that I need to wait 8 hrs even its PRN. Still didn't understand the situation. Informed Dr. Gibbs pt's want a dose of xanax before going home with orders.

## 2018-04-13 NOTE — PLAN OF CARE
Received a message from Charlene at [facility] who stated the DME was in dtr Kerrie's home.  Charlene is requesting discharge meds so they can order the meds.     9:40 a.m. - ramses Rene with PHN, pt's ambulance auth is #[id number].       04/13/18 0821   Discharge Reassessment   Assessment Type Discharge Planning Reassessment   Discharge Plan A Hospice/home

## 2018-04-13 NOTE — PLAN OF CARE
Problem: Patient Care Overview  Goal: Plan of Care Review  Outcome: Ongoing (interventions implemented as appropriate)  Recommendations  Recommendation/Intervention: 1.) Continue with regular diet 2.) Honor food preferences as able   Goals: 1.) PO intake >/= 50% while admitted  Nutrition Goal Status: 1.) progressing toward goal  Communication of RD Recs: other (comment) (care plan)

## 2018-04-13 NOTE — ASSESSMENT & PLAN NOTE
-Improving clinical status.   - However precipitating factors still present without immediate treatment for stage IV lung tumor.   - Will plan to treat conservatively with PRN breathing treatment

## 2018-04-15 LAB — BACTERIA BLD CULT: NORMAL

## 2018-04-16 NOTE — PHYSICIAN QUERY
PT Name: Delicia Murray  MR #: [id number]     Physician Query Form - Documentation Clarification      CDS/: Epi Guerrero RN               Contact information:   [email or web address]        This form is a permanent document in the medical record.     Query Date: April 16, 2018    By submitting this query, we are merely seeking further clarification of documentation. Please utilize your independent clinical judgment when addressing the question(s) below.    The Medical record reflects the following:    Supporting Clinical Findings Location in Medical Record     Disposition: Hospice/Medical Facility------------------------------------------------------>       DC summary 4/13- Hosp. Med.     Discharge Disposition HospiceHome------------------------------------------------------>    ... Informed Dr. Gibbs pt's want a dose of xanax before going home with orders.       Final Discharge Note: Discharge Disposition:  HospiceHome:  ([facility])       4/13@1137  Care Management    4/13 RNGaurav    4/13@1051 Care Management                                                                            Doctor, Do to conflicting documentation, please specify diagnosis or diagnoses associated with above clinical findings.    Provider Use Only    Please roderick the Discharge Disposition:    [ x  ]  Hospice/ Home    [  ]  Hospice/ Medical Facility    [  ]  Other/ specify: ________________    [  ] Clinically undetermined

## 2018-04-30 ENCOUNTER — TELEPHONE (OUTPATIENT)
Dept: ADMINISTRATIVE | Facility: CLINIC | Age: 78
End: 2018-04-30

## 2022-07-17 NOTE — ED PROVIDER NOTES
Department of Anesthesiology  Postprocedure Note    Patient: Azael Perez  MRN: [id number]  YOB: 1991  Date of evaluation: 7/17/2022      Procedure Summary     Date: 07/17/22 Room / Location: [address] [place] [address] / [address] [place] [address]    Anesthesia Start: 5173 Anesthesia Stop: 6740    Procedure: APPENDECTOMY LAPAROSCOPIC Diagnosis:       Acute appendicitis with generalized peritonitis without gangrene, unspecified whether abscess present, unspecified whether perforation present      (ACUTE APPENDICITIS)    Surgeons: Blaze Moore MD Responsible Provider: Kiki Flores MD    Anesthesia Type: general ASA Status: 2          Anesthesia Type: No value filed.     Cedric Phase I: Cedric Score: 9    Cedric Phase II:        Anesthesia Post Evaluation    Patient location during evaluation: PACU  Patient participation: complete - patient participated  Level of consciousness: awake  Pain score: 3  Airway patency: patent  Nausea & Vomiting: nausea and no vomiting  Complications: no  Cardiovascular status: blood pressure returned to baseline  Respiratory status: acceptable  Hydration status: euvolemic Encounter Date: 4/7/2018       History     Chief Complaint   Patient presents with    Shortness of Breath      x 2 1/2 hours     Chief complaint: Shortness of breath    History of present illness:Delicia Murray is a 78 y.o. female who presents via ambulance with shortness of breath that began 1 hour prior to arrival.  She has a history of lung carcinoma and received chemotherapy approximately one month ago.  She denies any fever and has no chest discomfort.  She received intravenous steroids and inhaled nebulizers in route and reports mild improvement.          Review of patient's allergies indicates:   Allergen Reactions    Ferrlecit [sodium ferric gluconat-sucrose]      Generalized rash, pruritus    Sulfa (sulfonamide antibiotics)     Adhesive Rash     Past Medical History:   Diagnosis Date    Anemia     Blood transfusion     Cancer     lung cancer    Colon polyps     COPD (chronic obstructive pulmonary disease)     Depression     Insomnia     Vertigo      Past Surgical History:   Procedure Laterality Date    broken jaw      left    CHOLECYSTECTOMY      HYSTERECTOMY      radiation      SKIN BIOPSY      TONSILLECTOMY       Family History   Problem Relation Age of Onset    Heart disease Mother     Cancer Father 69     unknown     Social History   Substance Use Topics    Smoking status: Former Smoker     Packs/day: 0.50     Years: 60.00    Smokeless tobacco: Former User     Quit date: 10/3/2016    Alcohol use Yes      Comment: occasional     Review of Systems   Constitutional: Negative for activity change, appetite change, chills, fatigue and fever.   Eyes: Negative for visual disturbance.   Respiratory: Positive for shortness of breath. Negative for apnea.    Cardiovascular: Negative for chest pain and palpitations.   Gastrointestinal: Negative for abdominal distention and abdominal pain.   Genitourinary: Negative for difficulty urinating.   Musculoskeletal: Negative for neck pain.   Skin:  Negative for pallor and rash.   Neurological: Negative for headaches.   Hematological: Does not bruise/bleed easily.   Psychiatric/Behavioral: Negative for agitation.       Physical Exam     Initial Vitals   BP Pulse Resp Temp SpO2   04/07/18 0525 04/07/18 0525 04/07/18 0525 04/07/18 0525 04/07/18 0523   (!) 100/52 (!) 153 (!) 60 99.1 °F (37.3 °C) (!) 88 %      MAP       04/07/18 0525       68         Physical Exam    Nursing note and vitals reviewed.  Constitutional: She appears well-developed and well-nourished.   HENT:   Head: Normocephalic and atraumatic.   Eyes: Conjunctivae are normal.   Neck: Normal range of motion. Neck supple.   Cardiovascular: Normal rate, regular rhythm and normal heart sounds. Exam reveals no gallop and no friction rub.    No murmur heard.  Pulmonary/Chest: She is in respiratory distress. She has wheezes. She has rhonchi. She has no rales.   Abdominal: Soft. She exhibits no distension. There is no tenderness.   Musculoskeletal: Normal range of motion.   Neurological: She is alert and oriented to person, place, and time.   Skin: Skin is warm and dry. No erythema.   Psychiatric: She has a normal mood and affect.         ED Course   Critical Care  Date/Time: 5/20/2018 7:34 AM  Performed by: ARVIN CABRERA III  Authorized by: CONRAD KNUTSON   Direct patient critical care time: 60 minutes  Additional history critical care time: 5 minutes  Ordering / reviewing critical care time: 5 minutes  Documentation critical care time: 10 minutes  Consulting other physicians critical care time: 5 minutes  Total critical care time (exclusive of procedural time) : 85 minutes  Critical care was necessary to treat or prevent imminent or life-threatening deterioration of the following conditions: respiratory failure.  Critical care was time spent personally by me on the following activities: review of old charts, pulse oximetry, ordering and review of laboratory studies, obtaining history from patient or  surrogate, evaluation of patient's response to treatment, re-evaluation of patient's condition, ordering and review of radiographic studies, ordering and performing treatments and interventions, examination of patient and discussions with primary provider.  Subsequent provider of critical care: I assumed direction of critical care for this patient from another provider of my specialty.        Labs Reviewed   B-TYPE NATRIURETIC PEPTIDE   COMPREHENSIVE METABOLIC PANEL   CBC W/ AUTO DIFFERENTIAL     EKG Readings: (Independently Interpreted)   Rhythm: Sinus Tachycardia. Heart Rate: 143. Ectopy: PVCs. Conduction: Normal. ST Segments: Non-Specific ST Segment Depression. T Waves: Normal. Axis: Normal. Clinical Impression: Sinus Tachycardia          Medical Decision Making:   History:   Old Records Summarized: records from previous admission(s).  Independently Interpreted Test(s):   I have ordered and independently interpreted X-rays - see summary below.       <> Summary of X-Ray Reading(s): Chest x-ray independently interpreted by me demonstrates bilateral patchy infiltrates relatively unchanged from previous.  ED Management:  Delicia Murray is a 78 y.o. female who presents with  acute onset of shortness of breath which began at 3 AM.  She has a history of small cell carcinoma and COPD.  The symptoms are suggestive of a COPD exacerbation.  She received intravenous steroids immediately prior to arrival.  She has a WBC of 25,000 with a left shift.  She has had no fever with no definitive evidence of pneumonia.  She will be admitted for bronchodilators.  Discussed with Arianna javier who will admit on behalf of Dr. Peck.                      Clinical Impression:   The primary encounter diagnosis was COPD with exacerbation. Diagnoses of Tachycardia and SOB (shortness of breath) were also pertinent to this visit.                           Dario Velasquez III, MD  04/07/18 0643       Dario Velasquez III, MD  05/20/18 7513

## 2023-04-14 NOTE — PLAN OF CARE
Problem: Patient Care Overview  Goal: Plan of Care Review  Outcome: Ongoing (interventions implemented as appropriate)  Patient safe and free from falls. Abx infusing to L wrist 20g. 5L O2 per [place]. NSR to ST on monitor. PRN pain medication provided per orders for c/o pain. Swallows pills whole. Bedpan assistance. Bed in lowest position, wheels locked, SR raised, call light in reach.       36.9

## 2023-05-18 NOTE — TELEPHONE ENCOUNTER
Called in 20mg prednisone #12 si a day for 3 days repeat as needed. 3 refills    ----- Message from Rachel Salgado sent at 5/3/2017 12:48 PM CDT -----  Contact: self   [phone]  Patient called asking for a new rx prednisone with [facility] on [facility]?Lou Owen Thanks!       Interval History:      Review of Systems   Constitutional:  Negative for appetite change, fatigue and fever.   Respiratory:  Negative for cough, shortness of breath and wheezing.    Cardiovascular:  Negative for chest pain and leg swelling.   Gastrointestinal:  Negative for abdominal distention, abdominal pain, constipation, diarrhea, nausea and vomiting.   Skin:  Negative for color change, pallor, rash and wound.   Neurological:  Negative for tremors, syncope and headaches.   Psychiatric/Behavioral:  Negative for agitation and behavioral problems.    Objective:     Vital Signs (Most Recent):  Temp: 98.6 °F (37 °C) (05/18/23 1415)  Pulse: 98 (05/18/23 1415)  Resp: 20 (05/18/23 1415)  BP: (!) 170/94 (05/18/23 1415)  SpO2: 100 % (05/18/23 1415) Vital Signs (24h Range):  Temp:  [96.9 °F (36.1 °C)-100.1 °F (37.8 °C)] 98.6 °F (37 °C)  Pulse:  [] 98  Resp:  [16-20] 20  SpO2:  [91 %-100 %] 100 %  BP: ()/() 170/94     Weight: 90.3 kg (199 lb)  Body mass index is 28.55 kg/m².    Intake/Output Summary (Last 24 hours) at 5/18/2023 1443  Last data filed at 5/18/2023 1215  Gross per 24 hour   Intake 4117.7 ml   Output 2925 ml   Net 1192.7 ml         Physical Exam  Constitutional:       General: He is not in acute distress.     Appearance: Normal appearance. He is normal weight. He is not ill-appearing.   HENT:      Head: Normocephalic and atraumatic.      Nose: Nose normal.   Eyes:      General: No scleral icterus.     Conjunctiva/sclera: Conjunctivae normal.   Cardiovascular:      Rate and Rhythm: Normal rate and regular rhythm.      Pulses: Normal pulses.      Heart sounds: Normal heart sounds.   Pulmonary:      Effort: Pulmonary effort is normal.      Breath sounds: Normal breath sounds.   Abdominal:      General: Abdomen is flat. Bowel sounds are normal.      Palpations: Abdomen is soft.   Skin:     General: Skin is warm and dry.      Findings: No erythema or rash.   Neurological:      General: No focal  deficit present.      Mental Status: He is alert and oriented to person, place, and time.   Psychiatric:         Mood and Affect: Mood normal.         Behavior: Behavior normal.         Thought Content: Thought content normal.           Significant Labs: All pertinent labs within the past 24 hours have been reviewed.  BMP:   Recent Labs   Lab 05/18/23  0516   *   K 3.9   CO2 28   BUN 16.0   CREATININE 0.84   CALCIUM 7.4*     CBC:   Recent Labs   Lab 05/16/23  1539 05/17/23  0423 05/18/23  0516   WBC 12.22* 16.83* 11.45   HGB 8.6* 7.3* 5.9*   HCT 28.5* 24.4* 18.9*   * 499* 376*       Significant Imaging: I have reviewed all pertinent imaging results/findings within the past 24 hours.

## 2024-09-16 NOTE — DISCHARGE SUMMARY
Radiology Discharge Summary      Hospital Course: The patient underwent a CT guided right lower lobe nodule biopsy. No immediate post procedure pneumothorax was identified on the CT or subsequent followup chest radiographs. The patient did experience hemoptysis during the procedure which decreased significantly after placing the patient in a right lateral decubitus position and resolved once the patient reached recovery.    Admit Date: 8/28/2017  Discharge Date: 08/28/2017     Instructions Given to Patient: Yes  Diet: Resume prior diet  Activity: activity as tolerated and no driving for today    Description of Condition on Discharge: Stable  Vital Signs (Most Recent): Temp: 97.5 °F (36.4 °C) (08/28/17 1030)  Pulse: 80 (08/28/17 1315)  Resp: 20 (08/28/17 1315)  BP: 131/60 (08/28/17 1315)  SpO2: (!) 93 % (08/28/17 1315)    Discharge Disposition: Home    Discharge Diagnosis: Lung nodule     Follow-up: With Dr. Ceballos, as indicated.    The patient was advised to return to the emergency room should she develop new onset chest pain or severe hemoptysis.    @SIG@   [Interpreters_IDNumber] : [id number] [Interpreters_FullName] : Chris [[email or web address]] : [place]

## (undated) DEVICE — SUT 2-0 VICRYL / SH (J417)

## (undated) DEVICE — CONTAINER SPECIMEN STRL 4OZ

## (undated) DEVICE — GAUZE SPONGE BULKEE 6X6.75IN

## (undated) DEVICE — GLOVE SURG ULTRA TOUCH 8

## (undated) DEVICE — SEE MEDLINE ITEM 157117

## (undated) DEVICE — APPLICATOR CHLORAPREP ORN 26ML

## (undated) DEVICE — PACK CUSTOM UNIV BASIN SLI

## (undated) DEVICE — SYR 10CC LUER LOCK

## (undated) DEVICE — SLEEVE SCD EXPRESS CALF MEDIUM

## (undated) DEVICE — CLOSURE SKIN STERI STRIP 1/2X4

## (undated) DEVICE — SOL 9P NACL IRR PIC IL

## (undated) DEVICE — SUT MONOCRYL 4-0 SH UND MON

## (undated) DEVICE — SUT 3-0 VICRYL / SH (J416)

## (undated) DEVICE — DRAPE LAP TIBURON 77X122IN

## (undated) DEVICE — DRESSING TRANS 4X4 TEGADERM

## (undated) DEVICE — LINER SUCTION 3000CC

## (undated) DEVICE — SEE MEDLINE ITEM 146292

## (undated) DEVICE — SYR DISP LL 5CC

## (undated) DEVICE — SWABSTICK BENZOIN 4 IN

## (undated) DEVICE — SEE MEDLINE ITEM 152622

## (undated) DEVICE — ELECTRODE REM PLYHSV RETURN 9

## (undated) DEVICE — DRAPE C-ARM FOR MOBILE XRAY

## (undated) DEVICE — NDL SAFETY 25G X 1.5 ECLIPSE